# Patient Record
Sex: FEMALE | Race: BLACK OR AFRICAN AMERICAN | Employment: FULL TIME | URBAN - METROPOLITAN AREA
[De-identification: names, ages, dates, MRNs, and addresses within clinical notes are randomized per-mention and may not be internally consistent; named-entity substitution may affect disease eponyms.]

---

## 2018-03-28 ENCOUNTER — APPOINTMENT (OUTPATIENT)
Dept: GENERAL RADIOLOGY | Age: 33
End: 2018-03-28
Attending: INTERNAL MEDICINE
Payer: COMMERCIAL

## 2018-03-28 ENCOUNTER — HOSPITAL ENCOUNTER (EMERGENCY)
Age: 33
Discharge: HOME | End: 2018-03-28
Attending: INTERNAL MEDICINE | Admitting: INTERNAL MEDICINE
Payer: COMMERCIAL

## 2018-03-28 VITALS
HEIGHT: 59 IN | TEMPERATURE: 98.5 F | HEART RATE: 118 BPM | RESPIRATION RATE: 18 BRPM | SYSTOLIC BLOOD PRESSURE: 154 MMHG | WEIGHT: 215 LBS | OXYGEN SATURATION: 100 % | BODY MASS INDEX: 43.34 KG/M2 | DIASTOLIC BLOOD PRESSURE: 97 MMHG

## 2018-03-28 DIAGNOSIS — R52 BODY ACHES: ICD-10-CM

## 2018-03-28 DIAGNOSIS — J20.9 ACUTE BRONCHITIS, UNSPECIFIED ORGANISM: Primary | ICD-10-CM

## 2018-03-28 LAB
FLUAV AG NPH QL IA: NEGATIVE
FLUBV AG NOSE QL IA: NEGATIVE

## 2018-03-28 PROCEDURE — 99283 EMERGENCY DEPT VISIT LOW MDM: CPT

## 2018-03-28 PROCEDURE — 74011250637 HC RX REV CODE- 250/637: Performed by: PHYSICIAN ASSISTANT

## 2018-03-28 PROCEDURE — 87804 INFLUENZA ASSAY W/OPTIC: CPT | Performed by: INTERNAL MEDICINE

## 2018-03-28 PROCEDURE — 71046 X-RAY EXAM CHEST 2 VIEWS: CPT

## 2018-03-28 RX ORDER — IBUPROFEN 600 MG/1
600 TABLET ORAL
Qty: 20 TAB | Refills: 0 | Status: SHIPPED | OUTPATIENT
Start: 2018-03-28 | End: 2018-10-09

## 2018-03-28 RX ORDER — AZITHROMYCIN 250 MG/1
TABLET, FILM COATED ORAL
Qty: 6 TAB | Refills: 0 | Status: SHIPPED | OUTPATIENT
Start: 2018-03-28 | End: 2018-04-02

## 2018-03-28 RX ORDER — METHYLPREDNISOLONE 4 MG/1
TABLET ORAL
Qty: 1 DOSE PACK | Refills: 0 | Status: SHIPPED | OUTPATIENT
Start: 2018-03-28 | End: 2018-10-09

## 2018-03-28 RX ORDER — HYDROCODONE POLISTIREX AND CHLORPHENIRAMINE POLISTIREX 10; 8 MG/5ML; MG/5ML
5 SUSPENSION, EXTENDED RELEASE ORAL
Status: COMPLETED | OUTPATIENT
Start: 2018-03-28 | End: 2018-03-28

## 2018-03-28 RX ORDER — IBUPROFEN 400 MG/1
800 TABLET ORAL
Status: COMPLETED | OUTPATIENT
Start: 2018-03-28 | End: 2018-03-28

## 2018-03-28 RX ADMIN — HYDROCODONE POLISTIREX AND CHLORPHENIRAMINE POLISTIREX 5 ML: 10; 8 SUSPENSION, EXTENDED RELEASE ORAL at 18:53

## 2018-03-28 RX ADMIN — IBUPROFEN 800 MG: 400 TABLET, FILM COATED ORAL at 18:52

## 2018-03-28 NOTE — ED NOTES
Discharged per ambulatory, no acute distress on discharge, written inst with rx x 4 e faxed to pharmacy, verbalizes understanding  Patient armband removed and shredded

## 2018-03-28 NOTE — DISCHARGE INSTRUCTIONS
Bronchitis: Care Instructions  Your Care Instructions    Bronchitis is inflammation of the bronchial tubes, which carry air to the lungs. The tubes swell and produce mucus, or phlegm. The mucus and inflamed bronchial tubes make you cough. You may have trouble breathing. Most cases of bronchitis are caused by viruses like those that cause colds. Antibiotics usually do not help and they may be harmful. Bronchitis usually develops rapidly and lasts about 2 to 3 weeks in otherwise healthy people. Follow-up care is a key part of your treatment and safety. Be sure to make and go to all appointments, and call your doctor if you are having problems. It's also a good idea to know your test results and keep a list of the medicines you take. How can you care for yourself at home? · Take all medicines exactly as prescribed. Call your doctor if you think you are having a problem with your medicine. · Get some extra rest.  · Take an over-the-counter pain medicine, such as acetaminophen (Tylenol), ibuprofen (Advil, Motrin), or naproxen (Aleve) to reduce fever and relieve body aches. Read and follow all instructions on the label. · Do not take two or more pain medicines at the same time unless the doctor told you to. Many pain medicines have acetaminophen, which is Tylenol. Too much acetaminophen (Tylenol) can be harmful. · Take an over-the-counter cough medicine that contains dextromethorphan to help quiet a dry, hacking cough so that you can sleep. Avoid cough medicines that have more than one active ingredient. Read and follow all instructions on the label. · Breathe moist air from a humidifier, hot shower, or sink filled with hot water. The heat and moisture will thin mucus so you can cough it out. · Do not smoke. Smoking can make bronchitis worse. If you need help quitting, talk to your doctor about stop-smoking programs and medicines. These can increase your chances of quitting for good.   When should you call for help? Call 911 anytime you think you may need emergency care. For example, call if:  ? · You have severe trouble breathing. ?Call your doctor now or seek immediate medical care if:  ? · You have new or worse trouble breathing. ? · You cough up dark brown or bloody mucus (sputum). ? · You have a new or higher fever. ? · You have a new rash. ? Watch closely for changes in your health, and be sure to contact your doctor if:  ? · You cough more deeply or more often, especially if you notice more mucus or a change in the color of your mucus. ? · You are not getting better as expected. Where can you learn more? Go to http://dilma-shana.info/. Enter H333 in the search box to learn more about \"Bronchitis: Care Instructions. \"  Current as of: May 12, 2017  Content Version: 11.4  © 3666-8500 [employer]. Care instructions adapted under license by [employer] (which disclaims liability or warranty for this information). If you have questions about a medical condition or this instruction, always ask your healthcare professional. [place] [address] any warranty or liability for your use of this information. Cough: Care Instructions  Your Care Instructions    A cough is your body's response to something that bothers your throat or airways. Many things can cause a cough. You might cough because of a cold or the flu, bronchitis, or asthma. Smoking, postnasal drip, allergies, and stomach acid that backs up into your throat also can cause coughs. A cough is a symptom, not a disease. Most coughs stop when the cause, such as a cold, goes away. You can take a few steps at home to cough less and feel better. Follow-up care is a key part of your treatment and safety. Be sure to make and go to all appointments, and call your doctor if you are having problems. It's also a good idea to know your test results and keep a list of the medicines you take.   How can you care for yourself at home? · Drink lots of water and other fluids. This helps thin the mucus and soothes a dry or sore throat. Honey or lemon juice in hot water or tea may ease a dry cough. · Take cough medicine as directed by your doctor. · Prop up your head on pillows to help you breathe and ease a dry cough. · Try cough drops to soothe a dry or sore throat. Cough drops don't stop a cough. Medicine-flavored cough drops are no better than candy-flavored drops or hard candy. · Do not smoke. Avoid secondhand smoke. If you need help quitting, talk to your doctor about stop-smoking programs and medicines. These can increase your chances of quitting for good. When should you call for help? Call 911 anytime you think you may need emergency care. For example, call if:  ? · You have severe trouble breathing. ?Call your doctor now or seek immediate medical care if:  ? · You cough up blood. ? · You have new or worse trouble breathing. ? · You have a new or higher fever. ? · You have a new rash. ? Watch closely for changes in your health, and be sure to contact your doctor if:  ? · You cough more deeply or more often, especially if you notice more mucus or a change in the color of your mucus. ? · You have new symptoms, such as a sore throat, an earache, or sinus pain. ? · You do not get better as expected. Where can you learn more? Go to http://dilma-shana.info/. Enter D279 in the search box to learn more about \"Cough: Care Instructions. \"  Current as of: May 12, 2017  Content Version: 11.4  © 1839-9898 [employer]. Care instructions adapted under license by [employer] (which disclaims liability or warranty for this information). If you have questions about a medical condition or this instruction, always ask your healthcare professional. [address] any warranty or liability for your use of this information.

## 2018-03-28 NOTE — LETTER
[facility] [place] 
THE [facility] EMERGENCY DEPT 
[address] [phone] Work/School Note Date: 3/28/2018 To Whom It May concern: 
 
Malu Garza was seen and treated today in the emergency room by the following provider(s): 
Attending Provider: Renee Sanchez MD 
Physician Assistant: Albina Sanford. Malu Garza may return to work on 4/2/18. Sincerely, Roxi Iqbal PA-C

## 2018-03-28 NOTE — ED TRIAGE NOTES
Triage: pt complains of cough, nasal congestion, and body aches x 3 days. Sepsis Screening completed    (  )Patient meets SIRS criteria. (  x)Patient does not meet SIRS criteria.       SIRS Criteria is achieved when two or more of the following are present   Temperature < 96.8°F (36°C) or > 100.9°F (38.3°C)   Heart Rate > 90 beats per minute   Respiratory Rate > 20 breaths per minute   WBC count > 12,000 or <4,000 or > 10% bands

## 2018-03-28 NOTE — ED PROVIDER NOTES
EMERGENCY DEPARTMENT HISTORY AND PHYSICAL EXAM    Date: 3/28/2018  Patient Name: Gabrielle Willis    History of Presenting Illness     Chief Complaint   Patient presents with    Cough    Generalized Body Aches         History Provided By: Patient    Chief Complaint: Cough  Duration: 4 days  Timing:  Worsening  Severity: 7 out of 10  Modifying Factors: Minimal relief with Mucinex and Tylenol  Associated Symptoms: subjective fever, hot/cold flashes, congestion, sore throat, body aches, and chest and throat pain with coughing    Additional History (Context):   6:03 PM  Gabrielle Willis is a 34 y.o. female presents to the emergency department C/O gradually worsening productive cough, onset 4 days ago. Associated sxs include subjective fever, hot/cold flashes, congestion, sore throat, sinus pressure, body aches, and chest and throat pain with coughing. Tried Mucinex and Tylenol with minimal relief. Pt denies use of tobacco/EtOH/illicit drugs, ear pain, and any other sxs or complaints. PCP: None        Past History     Past Medical History:  History reviewed. No pertinent past medical history. Past Surgical History:  History reviewed. No pertinent surgical history. Family History:  History reviewed. No pertinent family history. Social History:  Social History   Substance Use Topics    Smoking status: Never Smoker    Smokeless tobacco: Never Used    Alcohol use No       Allergies: Allergies   Allergen Reactions    Nickel Rash    Shellfish Derived Nausea and Vomiting         Review of Systems   Review of Systems   Constitutional: Positive for chills (hot/cold flashes) and fever (subjective). HENT: Positive for congestion, sinus pressure and sore throat. Negative for ear pain. Respiratory: Positive for cough. Cardiovascular: Positive for chest pain (with coughing). Musculoskeletal: Positive for myalgias (body aches). All other systems reviewed and are negative.       Physical Exam     Vitals: 03/28/18 1753   BP: (!) 154/97   Pulse: (!) 118   Resp: 18   Temp: 98.5 °F (36.9 °C)   SpO2: 100%   Weight: 97.5 kg (215 lb)   Height: 4' 11\" (1.499 m)     Physical Exam   Constitutional: She is oriented to person, place, and time. She appears well-developed and well-nourished. HENT:   Head: Normocephalic and atraumatic. Right Ear: Hearing, tympanic membrane, external ear and ear canal normal.   Left Ear: Hearing, tympanic membrane, external ear and ear canal normal.   Nose: Mucosal edema present. No nasal septal hematoma. No epistaxis. Right sinus exhibits no frontal sinus tenderness. Left sinus exhibits no maxillary sinus tenderness and no frontal sinus tenderness. Mouth/Throat: Uvula is midline and mucous membranes are normal. No trismus in the jaw. No uvula swelling. Posterior oropharyngeal erythema present. No oropharyngeal exudate, posterior oropharyngeal edema or tonsillar abscesses. Airway is grossly patent, no omaira's angina   Eyes: EOM are normal. Pupils are equal, round, and reactive to light. No scleral icterus. Neck: Neck supple. No tracheal deviation present. Cardiovascular: Normal rate, regular rhythm and normal heart sounds. Exam reveals no gallop and no friction rub. No murmur heard. Pulmonary/Chest: Effort normal and breath sounds normal. No respiratory distress. She has no wheezes. She has no rales. Hacking cough   Neurological: She is alert and oriented to person, place, and time. No cranial nerve deficit. Skin: Skin is warm and dry. No rash noted. She is not diaphoretic. No erythema. No pallor. Psychiatric: She has a normal mood and affect. Her behavior is normal.   Nursing note and vitals reviewed.          Diagnostic Study Results     Labs -     Recent Results (from the past 12 hour(s))   INFLUENZA A & B AG (RAPID TEST)    Collection Time: 03/28/18  5:59 PM   Result Value Ref Range    Influenza A Antigen NEGATIVE  NEG      Influenza B Antigen NEGATIVE  NEG Radiologic Studies -     6:52 PM  RADIOLOGY FINDINGS  Chest X-ray shows NAP  Pending review by Radiologist  Recorded by [address] [place] [address], ED Scribe, as dictated by Tito Boyer PA-C     XR CHEST PA LAT    (Results Pending)     CT Results  (Last 48 hours)    None        CXR Results  (Last 48 hours)    None            Medical Decision Making   I am the first provider for this patient. I reviewed the vital signs, available nursing notes, past medical history, past surgical history, family history and social history. Vital Signs-Reviewed the patient's vital signs. Pulse Oximetry Analysis - 100% on RA     ED Course:   6:03 PM Initial assessment performed. The patients presenting problems have been discussed, and they are in agreement with the care plan formulated and outlined with them. I have encouraged them to ask questions as they arise throughout their visit. Provider Notes (Medical Decision Making): Impression:   Acute bronchitis. Will cross cover with Zpak, medrol, cough medicine, and steroids. Albina Holt    Procedures:  Procedures    Diagnosis and Disposition       DISCHARGE NOTE:  6:52 PM  [address] [place]  results have been reviewed with her. She has been counseled regarding her diagnosis, treatment, and plan. She verbally conveys understanding and agreement of the signs, symptoms, diagnosis, treatment and prognosis and additionally agrees to follow up as discussed. She also agrees with the care-plan and conveys that all of her questions have been answered. I have also provided discharge instructions for her that include: educational information regarding their diagnosis and treatment, and list of reasons why they would want to return to the ED prior to their follow-up appointment, should her condition change. She has been provided with education for proper emergency department utilization. CLINICAL IMPRESSION:    1. Acute bronchitis, unspecified organism    2.  Body aches PLAN:  1. D/C Home  2. Current Discharge Medication List      START taking these medications    Details   dextromethorphan-guaiFENesin (ROBITUSSIN-DM)  mg/5 mL syrup Take 10 mL by mouth every six (6) hours as needed for Cough. Qty: 120 mL, Refills: 0      ibuprofen (MOTRIN) 600 mg tablet Take 1 Tab by mouth every six (6) hours as needed for Pain. Qty: 20 Tab, Refills: 0      methylPREDNISolone (MEDROL, JOSEFINA,) 4 mg tablet Take as directed  Qty: 1 Dose Pack, Refills: 0      azithromycin (ZITHROMAX Z-JOSEFINA) 250 mg tablet Take two tablets on the first day and then one every day until completion  Qty: 6 Tab, Refills: 0           3. Follow-up Information     Follow up With Details Comments Kanika Lara MD Schedule an appointment as soon as possible for a visit in 2 days PCP follow up. [address]  [phone]      [facility]  As needed, if symptoms worsen [address] [address]  [phone]        ___________________________   Attestations:     SCRIBE ATTESTATION:  This note is prepared by Adalgisa Ballard, acting as Scribe for Mary Schwartz PA-C.    PROVIDER ATTESTATION:  Mary Schwartz PA-C : The scribe's documentation has been prepared under my direction and personally reviewed by me in its entirety.  I confirm that the note above accurately reflects all work, treatment, procedures, and medical decision making performed by me.   _______________________________

## 2020-02-25 ENCOUNTER — HOSPITAL ENCOUNTER (OUTPATIENT)
Dept: ONCOLOGY | Age: 35
Discharge: HOME | End: 2020-02-25

## 2020-02-25 ENCOUNTER — OFFICE VISIT (OUTPATIENT)
Dept: ONCOLOGY | Age: 35
End: 2020-02-25

## 2020-02-25 ENCOUNTER — HOSPITAL ENCOUNTER (OUTPATIENT)
Dept: LAB | Age: 35
Discharge: HOME | End: 2020-02-25
Payer: COMMERCIAL

## 2020-02-25 VITALS
TEMPERATURE: 98.7 F | WEIGHT: 202 LBS | HEART RATE: 90 BPM | DIASTOLIC BLOOD PRESSURE: 83 MMHG | HEIGHT: 59 IN | RESPIRATION RATE: 16 BRPM | BODY MASS INDEX: 40.72 KG/M2 | OXYGEN SATURATION: 98 % | SYSTOLIC BLOOD PRESSURE: 123 MMHG

## 2020-02-25 DIAGNOSIS — D50.0 IRON DEFICIENCY ANEMIA DUE TO CHRONIC BLOOD LOSS: ICD-10-CM

## 2020-02-25 DIAGNOSIS — D50.0 IRON DEFICIENCY ANEMIA DUE TO CHRONIC BLOOD LOSS: Primary | ICD-10-CM

## 2020-02-25 LAB
25(OH)D3 SERPL-MCNC: 17.6 NG/ML (ref 30–100)
ALBUMIN SERPL-MCNC: 3.8 G/DL (ref 3.4–5)
ALBUMIN/GLOB SERPL: 1 {RATIO} (ref 0.8–1.7)
ALP SERPL-CCNC: 72 U/L (ref 45–117)
ALT SERPL-CCNC: 19 U/L (ref 13–56)
ANION GAP SERPL CALC-SCNC: 7 MMOL/L (ref 3–18)
AST SERPL-CCNC: 15 U/L (ref 10–38)
BASO+EOS+MONOS # BLD AUTO: 0.4 K/UL (ref 0–2.3)
BASO+EOS+MONOS NFR BLD AUTO: 7 % (ref 0.1–17)
BILIRUB SERPL-MCNC: 1.1 MG/DL (ref 0.2–1)
BUN SERPL-MCNC: 20 MG/DL (ref 7–18)
BUN/CREAT SERPL: 22 (ref 12–20)
CALCIUM SERPL-MCNC: 8.7 MG/DL (ref 8.5–10.1)
CHLORIDE SERPL-SCNC: 106 MMOL/L (ref 100–111)
CO2 SERPL-SCNC: 26 MMOL/L (ref 21–32)
CREAT SERPL-MCNC: 0.92 MG/DL (ref 0.6–1.3)
DIFFERENTIAL METHOD BLD: ABNORMAL
ERYTHROCYTE [DISTWIDTH] IN BLOOD BY AUTOMATED COUNT: 23.5 % (ref 11.5–14.5)
FERRITIN SERPL-MCNC: 4 NG/ML (ref 8–388)
FOLATE SERPL-MCNC: 6.6 NG/ML (ref 3.1–17.5)
GLOBULIN SER CALC-MCNC: 4 G/DL (ref 2–4)
GLUCOSE SERPL-MCNC: 84 MG/DL (ref 74–99)
HCT VFR BLD AUTO: 33.6 % (ref 36–48)
HGB BLD-MCNC: 9.4 G/DL (ref 12–16)
IRON SATN MFR SERPL: 4 % (ref 20–50)
IRON SERPL-MCNC: 18 UG/DL (ref 50–175)
LYMPHOCYTES # BLD: 2.4 K/UL (ref 1.1–5.9)
LYMPHOCYTES NFR BLD: 41 % (ref 14–44)
MCH RBC QN AUTO: 22.1 PG (ref 25–35)
MCHC RBC AUTO-ENTMCNC: 28 G/DL (ref 31–37)
MCV RBC AUTO: 79.1 FL (ref 78–102)
NEUTS SEG # BLD: 3.1 K/UL (ref 1.8–9.5)
NEUTS SEG NFR BLD: 52 % (ref 40–70)
PLATELET # BLD AUTO: 230 K/UL (ref 140–440)
POTASSIUM SERPL-SCNC: 3.8 MMOL/L (ref 3.5–5.5)
PROT SERPL-MCNC: 7.8 G/DL (ref 6.4–8.2)
RBC # BLD AUTO: 4.25 M/UL (ref 4.1–5.1)
RETICS/RBC NFR AUTO: 1.4 % (ref 0.5–2.3)
SODIUM SERPL-SCNC: 139 MMOL/L (ref 136–145)
TIBC SERPL-MCNC: 469 UG/DL (ref 250–450)
VIT B12 SERPL-MCNC: 421 PG/ML (ref 211–911)
WBC # BLD AUTO: 5.9 K/UL (ref 4.5–13)

## 2020-02-25 PROCEDURE — 85045 AUTOMATED RETICULOCYTE COUNT: CPT

## 2020-02-25 PROCEDURE — 80053 COMPREHEN METABOLIC PANEL: CPT

## 2020-02-25 PROCEDURE — 36415 COLL VENOUS BLD VENIPUNCTURE: CPT

## 2020-02-25 PROCEDURE — 82668 ASSAY OF ERYTHROPOIETIN: CPT

## 2020-02-25 PROCEDURE — 83540 ASSAY OF IRON: CPT

## 2020-02-25 PROCEDURE — 82728 ASSAY OF FERRITIN: CPT

## 2020-02-25 PROCEDURE — 82607 VITAMIN B-12: CPT

## 2020-02-25 PROCEDURE — 82306 VITAMIN D 25 HYDROXY: CPT

## 2020-02-25 NOTE — PROGRESS NOTES
Identified pt with two pt identifiers(name and ). Reviewed record in preparation for visit and have obtained necessary documentation. Chief Complaint   Patient presents with   [facility] Patient        Health Maintenance Due   Topic    DTaP/Tdap/Td series (1 - Tdap)    PAP AKA CERVICAL CYTOLOGY     Influenza Age 5 to Adult        Coordination of Care Questionnaire:  :   1) Have you been to an emergency room, urgent care, or hospitalized since your last visit? If yes, where when, and reason for visit? yes  @ [address] [place] [address]      2. Have seen or consulted any other health care provider since your last visit? If yes, where when, and reason for visit? NO      3) Do you have an Advanced Directive/ Living Will in place?  NO  If yes, do we have a copy on file NO  If no, would you like information NO      Learning Assessment 2020   PRIMARY LEARNER Patient   HIGHEST LEVEL OF EDUCATION - PRIMARY LEARNER  SOME COLLEGE   BARRIERS PRIMARY LEARNER NONE   CO-LEARNER CAREGIVER No   PRIMARY LANGUAGE ENGLISH   LEARNER PREFERENCE PRIMARY READING     LISTENING   ANSWERED BY Patient   RELATIONSHIP SELF        3 most recent PHQ Screens 2020   Little interest or pleasure in doing things Not at all   Feeling down, depressed, irritable, or hopeless Not at all   Total Score PHQ 2 0

## 2020-02-25 NOTE — PROGRESS NOTES
Hematology/Oncology Consultation Note    Name: Norleen Hammans  Date: 2020  : 1985    Other, MD Joaquín       Ms. Josy Mcwilliams  is a 27 y.o. -American woman who was referred for an evaluation of severe anemia. Subjective:   Chief complaint: Severe anemia    History of present illness:  Ms. Josy Mcwilliams is a 79-year-old -American woman who has a long history of anemia most likely iron deficiency related to chronic blood loss from heavy menstrual bleeding. The patient reports that H 15 she underwent a transfusion of packed red blood cells. She has subsequently undergone a history of D&C. She also had a prior spinal tap related to her anemia which had been causing what she feels was pressure on her brain. This was done to relieve pain. She complains of feeling tired and weak most of the time. On 2020 she had a transfusion of 2 units of packed red blood cells at a time when her hemoglobin was found to be quite low at about 6.2 g/dL. She reports having heavy menstrual bleeding each month lasting for about 5 days. Usually the first 3 to 4 days are quite heavy. She was started on birth control pills recently as a means of controlling her excessive menstrual bleeding but the total number of days increased to about 14 days of actual menstrual bleeding. She is now taking iron supplement over-the-counter twice daily. Primarily she complains of fatigue and weakness. In addition to the over-the-counter iron she takes Aleve occasionally for headaches and she does have a history of heartburn for which she takes Zantac. There are no other complaints or concerns.   Past Medical History:   Diagnosis Date    Adverse effect of anesthesia     difficulty getting pt. to sleep    Anemia     Fibroids     Functional heart murmur     GERD (gastroesophageal reflux disease)     Heavy menstrual bleeding     Iron deficiency     Ovarian cyst     Pelvic pain     Polyp of cervix        Allergies Allergen Reactions    Nickel Rash    Shellfish Derived Nausea and Vomiting       Past Surgical History:   Procedure Laterality Date    HX WISDOM TEETH EXTRACTION         Social History     Socioeconomic History    Marital status: UNKNOWN     Spouse name: Not on file    Number of children: Not on file    Years of education: Not on file    Highest education level: Not on file   Occupational History    Not on file   Social Needs    Financial resource strain: Not on file    Food insecurity:     Worry: Not on file     Inability: Not on file    Transportation needs:     Medical: Not on file     Non-medical: Not on file   Tobacco Use    Smoking status: Former Smoker    Smokeless tobacco: Never Used   Substance and Sexual Activity    Alcohol use: Yes     Comment: rare    Drug use: No    Sexual activity: Not Currently   Lifestyle    Physical activity:     Days per week: Not on file     Minutes per session: Not on file    Stress: Not on file   Relationships    Social connections:     Talks on phone: Not on file     Gets together: Not on file     Attends Yazidism service: Not on file     Active member of club or organization: Not on file     Attends meetings of clubs or organizations: Not on file     Relationship status: Not on file    Intimate partner violence:     Fear of current or ex partner: Not on file     Emotionally abused: Not on file     Physically abused: Not on file     Forced sexual activity: Not on file   Other Topics Concern    Not on file   Social History Narrative    Not on file       Family History   Problem Relation Age of Onset    Depression Mother     Heart Disease Father     Hypertension Father     Hypertension Sister     Hypertension Sister        Current Outpatient Medications   Medication Sig Dispense Refill    naproxen sodium (ALEVE) 220 mg cap Take  by mouth.  ferrous sulfate, dried (IRON, DRIED, PO) Take 60 mg by mouth daily.       diphenhydramine HCl (SLEEP AID, DIPHENHYDRAMINE, PO) Take  by mouth nightly as needed.  ergocalciferol (VITAMIN D2) 50,000 unit capsule Take 50,000 Units by mouth every seven (7) days.  Biotin 2,500 mcg cap Take 1 Tab by mouth daily.  raNITIdine (ZANTAC) 75 mg tab Take  by mouth daily as needed. Review of Systems    General ROS:The patient complains of some fatigue and weakness. Psychological ROS: patient denies having any psychological symptoms such as hallucinations, depression or anxiety. Ophthalmic ROS:the patient denies having any visual impairment or eye discomfort. ENT ROS: there are no abnormalities reported. Allergy and Immunology ROS:the patient denies having any seasonal allergies or allergies to medications other than those already outlined above. Hematological and Lymphatic ROS: the patient denies having any bruising, bleeding or lymphadenopathy. Endocrine ROS: the patient denies having any heat or cold intolerance. There is no history of diabetes or thyroid disorders. Breast ROS: the patient denies having any history of breast mass, nipple discharge, or lumps. Respiratory ROS:the patient denies having any cough, shortness of breath, or dyspnea on exertion. Cardiovascular ROS: there are no complaints of chest pain, palpitations, chest pounding, or dyspnea on exertion. Gastrointestinal ROS: the patient denies having nausea, emesis, diarrhea, constipation, or blood in the stool. Genito-Urinary ROS: the patient denies having urinary urgency, frequency, or dysuria. Musculoskeletal ROS: with the exception of mild arthralgias the patient has no other musculoskeletal complaints. Neurological ROS: the patient denies having any numbness, tingling, or neurologic deficits. Dermatological ROS:patient denies having any unexplained rash, skin ulcerations, or hives.       Objective:     Visit Vitals  /83 (BP 1 Location: Left arm, BP Patient Position: Sitting)   Pulse 90   Temp 98.7 °F (37.1 °C) (Oral)   Resp 16   Ht 4' 11\" (1.499 m)   Wt 91.6 kg (202 lb)   LMP 02/05/2020   SpO2 98%   BMI 40.80 kg/m²        ECOGPS=0  Physical Exam:   Gen. Appearance: the patient is in no acute distress. Skin: There is no evidence of bruise or rash. HEENT: The head is normocephalic and atraumatic. The conjunctiva and sclera are clear. Pupils are equal, round, reactive to light, and accommodation. The extraocular movements are intact. ENT reveals no oral mucosal lesions or ulcerations. Neck: Supple without lymphadenopathy or thyromegaly. Lungs: Clear to auscultation and percussion; there are no wheezes or rhonchi. Heart: Regular rate and rhythm; there are no murmurs, gallops, or rubs. Abdomen: Bowel sounds are present and normal.  There is no guarding, tenderness, or hepatosplenomegaly. Extremities: There is no clubbing, cyanosis, or edema. Neurologic: There are no focal neurologic deficits. Lymphatics: There is no palpable peripheral lymphadenopathy. Lab data: The CBC dated 1/16/2020 showed a WBC count of 6.2, hemoglobin of 6.2 g/dL, hematocrit of 23.8%, and the platelet count was 753,008. Assessment:   Severe anemia: The patient presented on 6 1/16/2020 with severe anemia as evidenced by a hemoglobin of 6.2 g/dL with hematocrit of 23.8%. She did receive a transfusion with 2 units of packed red blood cells. She gives a history of having excessive menstrual bleeding since age 4 years. I suspect that her severe anemia is related to chronic blood loss over the years      Plan:   Severe anemia: At this time a comprehensive metabolic panel, CBC, iron profile, ferritin level, vitamin B12 level, and vitamin D level will be ordered. Additionally erythropoietin and reticulocyte count will also be requested. I will see her back in clinic in 2 weeks to review lab data.   If the ferritin level is exceedingly low she will be offered intravenous iron as a primary source of iron replacement with Injectafer at a dose of 750 mg given weekly for 2 doses. Follow-up in 2 weeks. Orders Placed This Encounter    COMPLETE CBC & AUTO DIFF WBC    InHouse CBC ([employer])     Standing Status:   Future     Standing Expiration Date:   3/7/6205    METABOLIC PANEL, COMPREHENSIVE     Standing Status:   Future     Standing Expiration Date:   2/25/2021    IRON PROFILE     Standing Status:   Future     Standing Expiration Date:   2/25/2021    FERRITIN     Standing Status:   Future     Standing Expiration Date:   2/25/2021    VITAMIN B12 & FOLATE     Standing Status:   Future     Standing Expiration Date:   2/25/2021    VITAMIN D, 25 HYDROXY     Standing Status:   Future     Standing Expiration Date:   2/25/2021    ERYTHROPOIETIN     Standing Status:   Future     Standing Expiration Date:   2/25/2021    RETICULOCYTE COUNT     Standing Status:   Future     Standing Expiration Date:   2/25/2021           Michelle Buchanan MD  2/25/2020      Please note: This document has been produced using voice recognition software. Unrecognized errors in transcription may be present.

## 2020-02-27 LAB — EPO SERPL-ACNC: 127.4 MIU/ML (ref 2.6–18.5)

## 2020-03-05 DIAGNOSIS — E55.9 VITAMIN D DEFICIENCY: Primary | ICD-10-CM

## 2020-03-05 PROBLEM — D50.0 IRON DEFICIENCY ANEMIA DUE TO CHRONIC BLOOD LOSS: Status: ACTIVE | Noted: 2020-03-05

## 2020-03-05 RX ORDER — ONDANSETRON 2 MG/ML
8 INJECTION INTRAMUSCULAR; INTRAVENOUS AS NEEDED
Status: CANCELLED | OUTPATIENT
Start: 2020-03-05

## 2020-03-05 RX ORDER — HYDROCORTISONE SODIUM SUCCINATE 100 MG/2ML
100 INJECTION, POWDER, FOR SOLUTION INTRAMUSCULAR; INTRAVENOUS AS NEEDED
Status: CANCELLED | OUTPATIENT
Start: 2020-03-05

## 2020-03-05 RX ORDER — ACETAMINOPHEN 325 MG/1
650 TABLET ORAL AS NEEDED
Status: CANCELLED
Start: 2020-03-05

## 2020-03-05 RX ORDER — DIPHENHYDRAMINE HYDROCHLORIDE 50 MG/ML
25 INJECTION, SOLUTION INTRAMUSCULAR; INTRAVENOUS AS NEEDED
Status: CANCELLED
Start: 2020-03-05

## 2020-03-05 RX ORDER — EPINEPHRINE 1 MG/ML
0.3 INJECTION, SOLUTION, CONCENTRATE INTRAVENOUS AS NEEDED
Status: CANCELLED | OUTPATIENT
Start: 2020-03-05

## 2020-03-05 RX ORDER — HEPARIN 100 UNIT/ML
300-500 SYRINGE INTRAVENOUS AS NEEDED
Status: CANCELLED
Start: 2020-03-05

## 2020-03-05 RX ORDER — SODIUM CHLORIDE 9 MG/ML
25 INJECTION, SOLUTION INTRAVENOUS CONTINUOUS
Status: CANCELLED | OUTPATIENT
Start: 2020-03-05

## 2020-03-05 RX ORDER — DIPHENHYDRAMINE HYDROCHLORIDE 50 MG/ML
50 INJECTION, SOLUTION INTRAMUSCULAR; INTRAVENOUS AS NEEDED
Status: CANCELLED
Start: 2020-03-05

## 2020-03-05 RX ORDER — SODIUM CHLORIDE 0.9 % (FLUSH) 0.9 %
10 SYRINGE (ML) INJECTION AS NEEDED
Status: CANCELLED
Start: 2020-03-05

## 2020-03-05 RX ORDER — ALBUTEROL SULFATE 0.83 MG/ML
2.5 SOLUTION RESPIRATORY (INHALATION) AS NEEDED
Status: CANCELLED
Start: 2020-03-05

## 2020-03-05 RX ORDER — ERGOCALCIFEROL 1.25 MG/1
50000 CAPSULE ORAL
Qty: 12 CAP | Refills: 0 | Status: SHIPPED | OUTPATIENT
Start: 2020-03-05 | End: 2020-03-05 | Stop reason: SDUPTHER

## 2020-03-05 RX ORDER — ERGOCALCIFEROL 1.25 MG/1
50000 CAPSULE ORAL
Qty: 12 CAP | Refills: 0 | Status: SHIPPED | OUTPATIENT
Start: 2020-03-05 | End: 2020-04-08

## 2020-03-05 RX ORDER — SODIUM CHLORIDE 9 MG/ML
10 INJECTION INTRAMUSCULAR; INTRAVENOUS; SUBCUTANEOUS AS NEEDED
Status: CANCELLED | OUTPATIENT
Start: 2020-03-05

## 2020-03-10 ENCOUNTER — OFFICE VISIT (OUTPATIENT)
Dept: ONCOLOGY | Age: 35
End: 2020-03-10

## 2020-03-10 VITALS
WEIGHT: 200 LBS | HEART RATE: 86 BPM | OXYGEN SATURATION: 99 % | DIASTOLIC BLOOD PRESSURE: 78 MMHG | TEMPERATURE: 98.6 F | BODY MASS INDEX: 40.32 KG/M2 | HEIGHT: 59 IN | SYSTOLIC BLOOD PRESSURE: 120 MMHG

## 2020-03-10 DIAGNOSIS — E55.9 VITAMIN D DEFICIENCY: Primary | ICD-10-CM

## 2020-03-10 DIAGNOSIS — D50.0 IRON DEFICIENCY ANEMIA DUE TO CHRONIC BLOOD LOSS: ICD-10-CM

## 2020-03-10 NOTE — PROGRESS NOTES
Hematology/medical oncology progress note    3/10/2020  Chastity Caba  YOB: 1995    PCP: Dr. Rodney Pantoja    Diagnosis iron deficiency anemia, severe and vitamin D deficiency    Ms. Gonzalo Pretty is a 58-year-old woman who is referred for evaluation of anemia. On 2/25/2020 the CBC revealed a WBC count of 5.9, hemoglobin was 9.4 g/dL, hematocrit 33.6%, and the platelet count was 301,043. The comprehensive metabolic panel revealed normal values except for slightly elevated BUN of 20 and a BUN/creatinine ratio of 22. A total bilirubin was slightly elevated at 1.1 iron profile showed an iron level of 18 mcg/dL with an iron saturation of only 4%. However her ferritin was markedly decreased at only 4 ng/mL consistent with severe iron deficiency anemia. I have recommended as treatment for the iron deficiency anemia Injectafer at a dose of 750 mg given over 1 hour for 2 doses. Additionally as a confirmation of her severe iron deficiency anemia erythropoietin level was elevated at 127.4. Vitamin D level was markedly diminished at 17.6 consistent with vitamin D deficiency which is classified as a vitamin D level less than 20 ng/mL. I have recommended vitamin D 50,000 units weekly for 12 weeks. We will see her back in clinic in about 6 weeks to see how well she is tolerating the treatment. The patient had her questions answered to her satisfaction. Total time 25 minutes, greater than 50% of the time was in counseling and coordination of care. Follow-up in 6 weeks. Dennis Alejandro MD, Vika High

## 2020-03-10 NOTE — PATIENT INSTRUCTIONS
Iron Deficiency Anemia: Care Instructions Your Care Instructions Anemia means that you do not have enough red blood cells. Red blood cells carry oxygen around your body. When you have anemia, it can make you pale, weak, and tired. Many things can cause anemia. The most common cause is loss of blood. This can happen if you have heavy menstrual periods. It can also happen if you have bleeding in your stomach or bowel. You can also get anemia if you don't have enough iron in your diet or if it's hard for your body to absorb iron. In some cases, pregnancy causes anemia. That's because a pregnant woman needs more iron. Your doctor may do more tests to find the cause of your anemia. If a disease or other health problem is causing it, your doctor will treat that problem. It's important to follow up with your doctor to make sure that your iron level returns to normal. 
Follow-up care is a key part of your treatment and safety. Be sure to make and go to all appointments, and call your doctor if you are having problems. It's also a good idea to know your test results and keep a list of the medicines you take. How can you care for yourself at home? · If your doctor recommended iron pills, take them as directed. ? Try to take the pills on an empty stomach. You can do this about 1 hour before or 2 hours after meals. But you may need to take iron with food to avoid an upset stomach. ? Do not take antacids or drink milk or anything with caffeine within 2 hours of when you take your iron. They can keep your body from absorbing the iron well. ? Vitamin C helps your body absorb iron. You may want to take iron pills with a glass of orange juice or some other food high in vitamin C. 
? Iron pills may cause stomach problems. These include heartburn, nausea, diarrhea, constipation, and cramps. It can help to drink plenty of fluids and include fruits, vegetables, and fiber in your diet. ? It's normal for iron pills to make your stool a greenish or grayish black. But internal bleeding can also cause dark stool. So it's important to tell your doctor about any color changes. ? Call your doctor if you think you are having a problem with your iron pills. Even after you start to feel better, it will take several months for your body to build up its supply of iron. ? If you miss a pill, don't take a double dose. ? Keep iron pills out of the reach of small children. Too much iron can be very dangerous. · Eat foods with a lot of iron. These include red meat, shellfish, poultry, and eggs. They also include beans, raisins, whole-grain bread, and leafy green vegetables. · Steam your vegetables. This is the best way to prepare them if you want to get as much iron as possible. · Be safe with medicines. Do not take nonsteroidal anti-inflammatory pain relievers unless your doctor tells you to. These include aspirin, naproxen (Aleve), and ibuprofen (Advil, Motrin). · Liquid iron can stain your teeth. But you can mix it with water or juice and drink it with a straw. Then it won't get on your teeth. When should you call for help? Call 911 anytime you think you may need emergency care. For example, call if: 
  · You passed out (lost consciousness).  
 Call your doctor now or seek immediate medical care if: 
  · You are short of breath.  
  · You are dizzy or light-headed, or you feel like you may faint.  
  · You have new or worse bleeding.  
 Watch closely for changes in your health, and be sure to contact your doctor if: 
  · You feel weaker or more tired than usual.  
  · You do not get better as expected. Where can you learn more? Go to http://dilma-shana.info/. Enter R733 in the search box to learn more about \"Iron Deficiency Anemia: Care Instructions. \" Current as of: March 28, 2019 Content Version: 12.2 © 0653-0136 [employer].  Care instructions adapted under license by [address] (which disclaims liability or warranty for this information). If you have questions about a medical condition or this instruction, always ask your healthcare professional. [address] any warranty or liability for your use of this information.

## 2020-03-24 ENCOUNTER — HOSPITAL ENCOUNTER (OUTPATIENT)
Dept: INFUSION THERAPY | Age: 35
Discharge: HOME | End: 2020-03-24
Payer: COMMERCIAL

## 2020-03-24 VITALS
DIASTOLIC BLOOD PRESSURE: 74 MMHG | RESPIRATION RATE: 20 BRPM | SYSTOLIC BLOOD PRESSURE: 115 MMHG | HEART RATE: 81 BPM | TEMPERATURE: 98.5 F | OXYGEN SATURATION: 100 %

## 2020-03-24 DIAGNOSIS — D50.0 IRON DEFICIENCY ANEMIA DUE TO CHRONIC BLOOD LOSS: Primary | ICD-10-CM

## 2020-03-24 PROCEDURE — 74011250636 HC RX REV CODE- 250/636: Performed by: NURSE PRACTITIONER

## 2020-03-24 PROCEDURE — 96365 THER/PROPH/DIAG IV INF INIT: CPT

## 2020-03-24 RX ORDER — DIPHENHYDRAMINE HYDROCHLORIDE 50 MG/ML
25 INJECTION, SOLUTION INTRAMUSCULAR; INTRAVENOUS AS NEEDED
Status: CANCELLED
Start: 2020-03-31

## 2020-03-24 RX ORDER — SODIUM CHLORIDE 0.9 % (FLUSH) 0.9 %
10 SYRINGE (ML) INJECTION AS NEEDED
Status: CANCELLED
Start: 2020-03-31

## 2020-03-24 RX ORDER — DIPHENHYDRAMINE HYDROCHLORIDE 50 MG/ML
50 INJECTION, SOLUTION INTRAMUSCULAR; INTRAVENOUS AS NEEDED
Status: CANCELLED
Start: 2020-03-31

## 2020-03-24 RX ORDER — ALBUTEROL SULFATE 0.83 MG/ML
2.5 SOLUTION RESPIRATORY (INHALATION) AS NEEDED
Status: CANCELLED
Start: 2020-03-31

## 2020-03-24 RX ORDER — EPINEPHRINE 1 MG/ML
0.3 INJECTION, SOLUTION, CONCENTRATE INTRAVENOUS AS NEEDED
Status: CANCELLED | OUTPATIENT
Start: 2020-03-31

## 2020-03-24 RX ORDER — SODIUM CHLORIDE 9 MG/ML
25 INJECTION, SOLUTION INTRAVENOUS CONTINUOUS
Status: CANCELLED | OUTPATIENT
Start: 2020-03-31

## 2020-03-24 RX ORDER — SODIUM CHLORIDE 0.9 % (FLUSH) 0.9 %
10 SYRINGE (ML) INJECTION AS NEEDED
Status: DISCONTINUED | OUTPATIENT
Start: 2020-03-24 | End: 2020-03-25 | Stop reason: HOSPADM

## 2020-03-24 RX ORDER — SODIUM CHLORIDE 9 MG/ML
10 INJECTION INTRAMUSCULAR; INTRAVENOUS; SUBCUTANEOUS AS NEEDED
Status: CANCELLED | OUTPATIENT
Start: 2020-03-31

## 2020-03-24 RX ORDER — ACETAMINOPHEN 325 MG/1
650 TABLET ORAL AS NEEDED
Status: CANCELLED
Start: 2020-03-31

## 2020-03-24 RX ORDER — SODIUM CHLORIDE 9 MG/ML
25 INJECTION, SOLUTION INTRAVENOUS CONTINUOUS
Status: DISCONTINUED | OUTPATIENT
Start: 2020-03-24 | End: 2020-03-25 | Stop reason: HOSPADM

## 2020-03-24 RX ORDER — ONDANSETRON 2 MG/ML
8 INJECTION INTRAMUSCULAR; INTRAVENOUS AS NEEDED
Status: CANCELLED | OUTPATIENT
Start: 2020-03-31

## 2020-03-24 RX ORDER — HEPARIN 100 UNIT/ML
300-500 SYRINGE INTRAVENOUS AS NEEDED
Status: CANCELLED
Start: 2020-03-31

## 2020-03-24 RX ORDER — HYDROCORTISONE SODIUM SUCCINATE 100 MG/2ML
100 INJECTION, POWDER, FOR SOLUTION INTRAMUSCULAR; INTRAVENOUS AS NEEDED
Status: CANCELLED | OUTPATIENT
Start: 2020-03-31

## 2020-03-24 RX ADMIN — SODIUM CHLORIDE 25 ML/HR: 9 INJECTION, SOLUTION INTRAVENOUS at 13:34

## 2020-03-24 RX ADMIN — FERRIC CARBOXYMALTOSE INJECTION 750 MG: 50 INJECTION, SOLUTION INTRAVENOUS at 13:35

## 2020-03-24 NOTE — PROGRESS NOTES
[facility] Progress Note    Date: 2020    Name: Paty Driver    MRN: [id number]         : 1985      Ms. Love Shook was assessed and education was provided. Ferric Carboxymaltose carenotes read to patient and copy sent home with patient, all questions answered. Ms. Anabella Pappas vitals were reviewed and patient was observed for 5 minutes prior to treatment. Patient Vitals for the past 4 hrs:   Temp Pulse Resp BP SpO2   20 1428 98.5 °F (36.9 °C) 81 20 115/74 100 %   20 1406 98.5 °F (36.9 °C) 74 20 111/71 100 %   20 1324 98.5 °F (36.9 °C) 79 20 112/79 100 %         Visit Vitals  /79 (BP 1 Location: Left arm, BP Patient Position: Sitting)   Pulse 79   Temp 98.5 °F (36.9 °C)   Resp 20   SpO2 100%   Breastfeeding No     PIV #22 started in left [address] x1 attempt. Ferric Carboxymaltose 750mg mixed in 250cc NS infused over 20 minutes as ordered. Upon completion of infusion Ms. Love Shook observed for 30 minutes. At the end of observation period Ms. Love Shook denies any signs or symptoms of an allergic reaction. PIV dc'd 2x2 and coban applied. Ms. Love Shook instructed to go to nearest ER if she develops and signs of allergic reaction. Ms. Love Shook tolerated the infusion, and had no complaints. Patient armband removed and shredded. Ms. Love Shook was discharged from [place] [address] in stable condition at 1435. She is to return on 20 at 1300 for her next  Ferric Carboxymaltose appointment.     Kaylee Mantilla RN  2020  2:00 PM

## 2020-03-31 ENCOUNTER — APPOINTMENT (OUTPATIENT)
Dept: INFUSION THERAPY | Age: 35
End: 2020-03-31
Payer: COMMERCIAL

## 2020-04-01 ENCOUNTER — HOSPITAL ENCOUNTER (OUTPATIENT)
Dept: INFUSION THERAPY | Age: 35
Discharge: HOME | End: 2020-04-01
Payer: COMMERCIAL

## 2020-04-01 VITALS
SYSTOLIC BLOOD PRESSURE: 109 MMHG | RESPIRATION RATE: 18 BRPM | TEMPERATURE: 98.4 F | DIASTOLIC BLOOD PRESSURE: 73 MMHG | OXYGEN SATURATION: 100 % | HEART RATE: 79 BPM

## 2020-04-01 DIAGNOSIS — D50.0 IRON DEFICIENCY ANEMIA DUE TO CHRONIC BLOOD LOSS: Primary | ICD-10-CM

## 2020-04-01 PROCEDURE — 74011250636 HC RX REV CODE- 250/636: Performed by: INTERNAL MEDICINE

## 2020-04-01 PROCEDURE — 96365 THER/PROPH/DIAG IV INF INIT: CPT | Performed by: NURSE PRACTITIONER

## 2020-04-01 RX ORDER — SODIUM CHLORIDE 9 MG/ML
25 INJECTION, SOLUTION INTRAVENOUS CONTINUOUS
Status: CANCELLED | OUTPATIENT
Start: 2020-04-07

## 2020-04-01 RX ORDER — ACETAMINOPHEN 325 MG/1
650 TABLET ORAL AS NEEDED
Status: CANCELLED
Start: 2020-04-07

## 2020-04-01 RX ORDER — HYDROCORTISONE SODIUM SUCCINATE 100 MG/2ML
100 INJECTION, POWDER, FOR SOLUTION INTRAMUSCULAR; INTRAVENOUS AS NEEDED
Status: CANCELLED | OUTPATIENT
Start: 2020-04-07

## 2020-04-01 RX ORDER — SODIUM CHLORIDE 9 MG/ML
10 INJECTION INTRAMUSCULAR; INTRAVENOUS; SUBCUTANEOUS AS NEEDED
Status: CANCELLED | OUTPATIENT
Start: 2020-04-07

## 2020-04-01 RX ORDER — DIPHENHYDRAMINE HYDROCHLORIDE 50 MG/ML
50 INJECTION, SOLUTION INTRAMUSCULAR; INTRAVENOUS AS NEEDED
Status: CANCELLED
Start: 2020-04-07

## 2020-04-01 RX ORDER — DIPHENHYDRAMINE HYDROCHLORIDE 50 MG/ML
25 INJECTION, SOLUTION INTRAMUSCULAR; INTRAVENOUS AS NEEDED
Status: CANCELLED
Start: 2020-04-07

## 2020-04-01 RX ORDER — EPINEPHRINE 1 MG/ML
0.3 INJECTION, SOLUTION, CONCENTRATE INTRAVENOUS AS NEEDED
Status: CANCELLED | OUTPATIENT
Start: 2020-04-07

## 2020-04-01 RX ORDER — ONDANSETRON 2 MG/ML
8 INJECTION INTRAMUSCULAR; INTRAVENOUS AS NEEDED
Status: CANCELLED | OUTPATIENT
Start: 2020-04-07

## 2020-04-01 RX ORDER — SODIUM CHLORIDE 0.9 % (FLUSH) 0.9 %
10 SYRINGE (ML) INJECTION AS NEEDED
Status: DISCONTINUED | OUTPATIENT
Start: 2020-04-01 | End: 2020-04-02 | Stop reason: HOSPADM

## 2020-04-01 RX ORDER — HEPARIN 100 UNIT/ML
300-500 SYRINGE INTRAVENOUS AS NEEDED
Status: CANCELLED
Start: 2020-04-07

## 2020-04-01 RX ORDER — ALBUTEROL SULFATE 0.83 MG/ML
2.5 SOLUTION RESPIRATORY (INHALATION) AS NEEDED
Status: CANCELLED
Start: 2020-04-07

## 2020-04-01 RX ORDER — SODIUM CHLORIDE 0.9 % (FLUSH) 0.9 %
10 SYRINGE (ML) INJECTION AS NEEDED
Status: CANCELLED
Start: 2020-04-07

## 2020-04-01 RX ADMIN — FERRIC CARBOXYMALTOSE INJECTION 750 MG: 50 INJECTION, SOLUTION INTRAVENOUS at 14:35

## 2020-04-01 RX ADMIN — Medication 10 ML: at 14:30

## 2020-04-01 NOTE — PROGRESS NOTES
[facility] Progress Note    Date: 2020    Name: Wade Riddle    MRN: [id number]         : 1985      Ms. Dariela Morrison was assessed and education was provided. Ferric Carboxymaltose carenotes read to patient and copy sent home with patient, all questions answered. Ms. Thea Newton vitals were reviewed and patient was observed for 5 minutes prior to treatment. Patient Vitals for the past 4 hrs:   Temp Pulse Resp BP SpO2   20 1459  79 18 109/73 100 %   20 1405 98.4 °F (36.9 °C) 84 18 113/77 100 %         Visit Vitals  /77 (BP 1 Location: Right arm, BP Patient Position: Sitting)   Pulse 84   Temp 98.4 °F (36.9 °C)   Resp 18   SpO2 100%   Breastfeeding No     PIV #22 started in right AC x3 attempts. Ferric Carboxymaltose 750mg mixed in 250cc NS infused over 20 minutes as ordered. Infusion complete. PIV dc'd 2x2 and coban applied. Ms. Dariela Morrison instructed to go to nearest ER if she develops and signs of allergic reaction. Ms. Dariela Morrison tolerated the infusion, and had no complaints. Patient armband removed and shredded. Ms. Dariela Morrison was discharged from [place] [address] in stable condition at 1500. She is to return on an as needed basis.     YOLIS James  2020

## 2020-05-01 ENCOUNTER — VIRTUAL VISIT (OUTPATIENT)
Dept: ONCOLOGY | Age: 35
End: 2020-05-01

## 2020-05-01 VITALS — HEIGHT: 59 IN | BODY MASS INDEX: 40.32 KG/M2 | WEIGHT: 200 LBS

## 2020-05-01 DIAGNOSIS — D50.0 IRON DEFICIENCY ANEMIA DUE TO CHRONIC BLOOD LOSS: Primary | ICD-10-CM

## 2020-05-01 DIAGNOSIS — E55.9 VITAMIN D DEFICIENCY: ICD-10-CM

## 2020-05-01 RX ORDER — ERGOCALCIFEROL 1.25 MG/1
50000 CAPSULE ORAL
Qty: 12 CAP | Refills: 2 | Status: SHIPPED | OUTPATIENT
Start: 2020-05-01 | End: 2021-01-02

## 2020-05-01 NOTE — PROGRESS NOTES
Hematology/medical oncology progress note    5/1/2020  Tobin Walton is a 27 y.o. female evaluated via telephone on 5/1/2020. YOB: 1985    PCP: Dr. Geralyn Gottron    Diagnosis: Iron deficiency anemia and vitamin D deficiency    Consent:  She and/or health care decision maker is aware that she may receive a bill for this telephone service, depending on her insurance coverage, and has provided verbal consent to proceed: YES      Documentation:  I communicated with the patient and/or health care decision maker about ongoing management of her iron deficiency and vitamin D. The patient recently completed 2 doses of Injectafer on 4/1/2020. I have explained to the patient that her pretreatment evaluation revealed that she had a hemoglobin of 3.6%. Ferritin level was only 4 ng/mL. Additionally vitamin D was 17.6 ng/mL. At this time I am ordering the patient vitamin D 50,000 units weekly for 12 weeks. She will be scheduled to follow-up in clinic in 8 weeks to reassess her CBC, comprehensive metabolic panel, iron profile, and ferritin levels. The patient had her questions answered to her satisfaction. Total time 25 minutes, greater than 50% of the time was in counseling and coordination of care. I AFFIRM this is a Patient Initiated Episode with an Established Patient who has not had a related appointment within my department in the past 7 days or scheduled within the next 24 hours. Total Time: Total time 25 minutes, greater than 50% of the time was in counseling and coordination of care. Note: not billable if this call serves to triage the patient into an appointment for the relevant concern      Lang Inman. Jovani Avila MD, [address]

## 2020-06-26 ENCOUNTER — HOSPITAL ENCOUNTER (OUTPATIENT)
Dept: LAB | Age: 35
Discharge: HOME | End: 2020-06-26
Payer: COMMERCIAL

## 2020-06-26 ENCOUNTER — OFFICE VISIT (OUTPATIENT)
Dept: ONCOLOGY | Age: 35
End: 2020-06-26

## 2020-06-26 ENCOUNTER — HOSPITAL ENCOUNTER (OUTPATIENT)
Dept: ONCOLOGY | Age: 35
Discharge: HOME | End: 2020-06-26

## 2020-06-26 ENCOUNTER — DOCUMENTATION ONLY (OUTPATIENT)
Dept: ONCOLOGY | Age: 35
End: 2020-06-26

## 2020-06-26 VITALS
OXYGEN SATURATION: 97 % | DIASTOLIC BLOOD PRESSURE: 91 MMHG | TEMPERATURE: 98.4 F | HEART RATE: 81 BPM | WEIGHT: 210 LBS | RESPIRATION RATE: 18 BRPM | SYSTOLIC BLOOD PRESSURE: 128 MMHG | BODY MASS INDEX: 42.41 KG/M2

## 2020-06-26 DIAGNOSIS — D50.0 IRON DEFICIENCY ANEMIA DUE TO CHRONIC BLOOD LOSS: Primary | ICD-10-CM

## 2020-06-26 DIAGNOSIS — E55.9 VITAMIN D DEFICIENCY: ICD-10-CM

## 2020-06-26 DIAGNOSIS — D50.0 IRON DEFICIENCY ANEMIA DUE TO CHRONIC BLOOD LOSS: ICD-10-CM

## 2020-06-26 LAB
25(OH)D3 SERPL-MCNC: 34.1 NG/ML (ref 30–100)
ALBUMIN SERPL-MCNC: 4 G/DL (ref 3.4–5)
ALBUMIN/GLOB SERPL: 1.1 {RATIO} (ref 0.8–1.7)
ALP SERPL-CCNC: 79 U/L (ref 45–117)
ALT SERPL-CCNC: 29 U/L (ref 13–56)
ANION GAP SERPL CALC-SCNC: 5 MMOL/L (ref 3–18)
AST SERPL-CCNC: 13 U/L (ref 10–38)
BASO+EOS+MONOS # BLD AUTO: 0.3 K/UL (ref 0–2.3)
BASO+EOS+MONOS NFR BLD AUTO: 6 % (ref 0.1–17)
BILIRUB SERPL-MCNC: 0.6 MG/DL (ref 0.2–1)
BUN SERPL-MCNC: 15 MG/DL (ref 7–18)
BUN/CREAT SERPL: 17 (ref 12–20)
CALCIUM SERPL-MCNC: 9.2 MG/DL (ref 8.5–10.1)
CHLORIDE SERPL-SCNC: 110 MMOL/L (ref 100–111)
CO2 SERPL-SCNC: 28 MMOL/L (ref 21–32)
CREAT SERPL-MCNC: 0.89 MG/DL (ref 0.6–1.3)
DIFFERENTIAL METHOD BLD: ABNORMAL
ERYTHROCYTE [DISTWIDTH] IN BLOOD BY AUTOMATED COUNT: 16.8 % (ref 11.5–14.5)
FERRITIN SERPL-MCNC: 26 NG/ML (ref 8–388)
GLOBULIN SER CALC-MCNC: 3.5 G/DL (ref 2–4)
GLUCOSE SERPL-MCNC: 85 MG/DL (ref 74–99)
HCT VFR BLD AUTO: 42.9 % (ref 36–48)
HGB BLD-MCNC: 14.2 G/DL (ref 12–16)
IRON SATN MFR SERPL: 28 % (ref 20–50)
IRON SERPL-MCNC: 90 UG/DL (ref 50–175)
LYMPHOCYTES # BLD: 1.7 K/UL (ref 1.1–5.9)
LYMPHOCYTES NFR BLD: 37 % (ref 14–44)
MCH RBC QN AUTO: 28.8 PG (ref 25–35)
MCHC RBC AUTO-ENTMCNC: 33.1 G/DL (ref 31–37)
MCV RBC AUTO: 87 FL (ref 78–102)
NEUTS SEG # BLD: 2.6 K/UL (ref 1.8–9.5)
NEUTS SEG NFR BLD: 57 % (ref 40–70)
PLATELET # BLD AUTO: 215 K/UL (ref 140–440)
POTASSIUM SERPL-SCNC: 4.4 MMOL/L (ref 3.5–5.5)
PROT SERPL-MCNC: 7.5 G/DL (ref 6.4–8.2)
RBC # BLD AUTO: 4.93 M/UL (ref 4.1–5.1)
SODIUM SERPL-SCNC: 143 MMOL/L (ref 136–145)
TIBC SERPL-MCNC: 326 UG/DL (ref 250–450)
WBC # BLD AUTO: 4.6 K/UL (ref 4.5–13)

## 2020-06-26 PROCEDURE — 36415 COLL VENOUS BLD VENIPUNCTURE: CPT

## 2020-06-26 PROCEDURE — 82306 VITAMIN D 25 HYDROXY: CPT

## 2020-06-26 PROCEDURE — 83540 ASSAY OF IRON: CPT

## 2020-06-26 PROCEDURE — 80053 COMPREHEN METABOLIC PANEL: CPT

## 2020-06-26 PROCEDURE — 82728 ASSAY OF FERRITIN: CPT

## 2020-06-26 NOTE — PROGRESS NOTES
Hematology/Oncology  Progress Note    Name: Khurram Davis  Date: 2020  : 1985    uLlú Xie MD     Ms. Frantz Orellana is a 27y.o. year old female who was seen for management of her enema and vitamin D deficiency     Current therapy: completed injectafer on 2020   Subjective:   Ms. Frantz Orellana is a 79-year-old woman who is referred for evaluation of anemia on 2020. On 2020 the CBC revealed a WBC count of 5.9, hemoglobin was 9.4 g/dL, hematocrit 33.6%, and the platelet count was 639,969. The comprehensive metabolic panel revealed normal values except for slightly elevated BUN of 20 and a BUN/creatinine ratio of 22. A total bilirubin was slightly elevated at 1.1 iron profile showed an iron level of 18 mcg/dL with an iron saturation of only 4%. However her ferritin was markedly decreased at only 4 ng/mL consistent with severe iron deficiency anemia. She was ordered treatment for the iron deficiency anemia with Injectafer at a dose of 750 mg given over 1 hour for 2 doses and this was completed on 2020. Additionally as a confirmation of her severe iron deficiency anemia erythropoietin level was elevated at 127.4. Vitamin D level was markedly diminished at 17.6 consistent with vitamin D deficiency which is classified as a vitamin D level less than 20 ng/mL. She was also provided vitamin D 50,000 units weekly for 12 weeks. Today she in the office for follow up after her iron infusion. She denied fever, chills, night sweat, unintentional weight loss, skin lumps or bumps, acute bleeding or bruising issues. Denied headache, acute vision change, dizziness, chest pain, worsen shortness of breath, palpitation, productive cough, nausea, vomiting, abdominal pain, altered bowel habits, dysuria, new bone pain or back pain, focal numbness or weakness.  Independent with ADLs and IADLs        History of present illness:  Ms. Frantz Orellana is a 79-year-old -American woman who has a long history of anemia most likely iron deficiency related to chronic blood loss from heavy menstrual bleeding. The patient reports that H 15 she underwent a transfusion of packed red blood cells. She has subsequently undergone a history of D&C. She also had a prior spinal tap related to her anemia which had been causing what she feels was pressure on her brain. This was done to relieve pain. She complains of feeling tired and weak most of the time. On January 16, 2020 she had a transfusion of 2 units of packed red blood cells at a time when her hemoglobin was found to be quite low at about 6.2 g/dL. She reports having heavy menstrual bleeding each month lasting for about 5 days. Usually the first 3 to 4 days are quite heavy. She was started on birth control pills recently as a means of controlling her excessive menstrual bleeding but the total number of days increased to about 14 days of actual menstrual bleeding. Past medical history, family history, and social history: these were reviewed and remains unchanged.     Past Medical History:   Diagnosis Date    Adverse effect of anesthesia     difficulty getting pt. to sleep    Anemia     Fibroids     Functional heart murmur     GERD (gastroesophageal reflux disease)     Heavy menstrual bleeding     Iron deficiency     Ovarian cyst     Pelvic pain     Polyp of cervix      Past Surgical History:   Procedure Laterality Date    HX WISDOM TEETH EXTRACTION       Social History     Socioeconomic History    Marital status: SINGLE     Spouse name: Not on file    Number of children: Not on file    Years of education: Not on file    Highest education level: Not on file   Occupational History    Not on file   Social Needs    Financial resource strain: Not on file    Food insecurity     Worry: Not on file     Inability: Not on file    Transportation needs     Medical: Not on file     Non-medical: Not on file   Tobacco Use    Smoking status: Former Smoker     Last attempt to quit: 4/8/2017     Years since quitting: 3.2    Smokeless tobacco: Never Used   Substance and Sexual Activity    Alcohol use: Yes     Comment: rare    Drug use: No    Sexual activity: Not Currently   Lifestyle    Physical activity     Days per week: Not on file     Minutes per session: Not on file    Stress: Not on file   Relationships    Social connections     Talks on phone: Not on file     Gets together: Not on file     Attends Restoration service: Not on file     Active member of club or organization: Not on file     Attends meetings of clubs or organizations: Not on file     Relationship status: Not on file    Intimate partner violence     Fear of current or ex partner: Not on file     Emotionally abused: Not on file     Physically abused: Not on file     Forced sexual activity: Not on file   Other Topics Concern     Service Not Asked    Blood Transfusions Not Asked    Caffeine Concern Not Asked    Occupational Exposure Not Asked   Lanett [place] Hazards Not Asked    Sleep Concern Not Asked    Stress Concern Not Asked    Weight Concern Not Asked    Special Diet Not Asked    Back Care Not Asked    Exercise Not Asked    Bike Helmet Not Asked    Seat Belt Not Asked    Self-Exams Not Asked   Social History Narrative    Not on file     Family History   Problem Relation Age of Onset    Depression Mother     Heart Disease Father     Hypertension Father     Hypertension Sister     Hypertension Sister      Current Outpatient Medications   Medication Sig Dispense Refill    ergocalciferol (ERGOCALCIFEROL) 1,250 mcg (50,000 unit) capsule Take 1 Cap by mouth every seven (7) days for 36 doses. 12 Cap 2    norethindrone-ethinyl estradiol (Junel FE 1/20, 28,) 1 mg-20 mcg (21)/75 mg (7) tab Take 1 Tab by mouth daily.  bisacodyL (DULCOLAX, BISACODYL,) 5 mg EC tablet Take 5 mg by mouth daily as needed for Constipation.       ibuprofen (MOTRIN) 800 mg tablet Take 800 mg by mouth every eight (8) hours as needed for Pain.  naproxen sodium (ALEVE) 220 mg cap Take  by mouth.  ferrous sulfate, dried (IRON, DRIED, PO) Take 60 mg by mouth two (2) times a day.  raNITIdine (ZANTAC) 75 mg tab Take  by mouth daily as needed. Review of Systems  Constitutional: The patient has no acute distress or discomfort. HEENT: The patient denies recent head trauma, eye pain, blurred vision,  hearing deficit, oropharyngeal mucosal pain or lesions, and the patient denies throat pain or discomfort. Lymphatics: The patient denies palpable peripheral lymphadenopathy. Hematologic: The patient denies having bruising, bleeding, or progressive fatigue. Respiratory: Patient denies having shortness of breath, cough, sputum production, fever, or dyspnea on exertion. Cardiovascular: The patient denies having leg pain, leg swelling, heart palpitations, chest permit, chest pain, or lightheadedness. The patient denies having dyspnea on exertion. Gastrointestinal: The patient denies having nausea, emesis, or diarrhea. The patient denies having any hematemesis or blood in the stool. Genitourinary: Patient denies having urinary urgency, frequency, or dysuria. The patient denies having blood in the urine. Psychological: The patient denies having symptoms of nervousness, anxiety, depression, or thoughts of harming himself some of this. Skin: Patient denies having skin rashes, skin, ulcerations, or unexplained itching or pruritus. Musculoskeletal: The patient denies having pain in the joints or bones. Objective:     Visit Vitals  BP (!) 128/91 (BP Patient Position: Sitting)   Pulse 81   Temp 98.4 °F (36.9 °C) (Oral)   Resp 18   Wt 95.3 kg (210 lb)   LMP 06/01/2020   SpO2 97%   BMI 42.41 kg/m²     ECOG PS0    Physical Exam:   Gen. Appearance: The patient is in no acute distress. Skin: There is no bruise or rash. HEENT: The exam is unremarkable. Neck: Supple without lymphadenopathy or thyromegaly. Lungs: Clear to auscultation and percussion; there are no wheezes or rhonchi. Heart: Regular rate and rhythm; there are no murmurs, gallops, or rubs. Abdomen: Bowel sounds are present and normal.  There is no guarding, tenderness, or hepatosplenomegaly. Extremities: There is no clubbing, cyanosis, or edema. Neurologic: There are no focal neurologic deficits. Lymphatics: There is no palpable peripheral lymphadenopathy. Musculoskeletal: The patient has full range of motion at all joints. There is no evidence of joint deformity or effusions. There is no focal joint tenderness. Psychological/psychiatric: There is no clinical evidence of anxiety, depression, or melancholy. Lab data:      Results for orders placed or performed during the hospital encounter of 06/26/20   CBC WITH 3 PART DIFF     Status: Abnormal   Result Value Ref Range Status    WBC 4.6 4.5 - 13.0 K/uL Final    RBC 4.93 4.10 - 5.10 M/uL Final    HGB 14.2 12.0 - 16 g/dL Final    HCT 42.9 36 - 48 % Final    MCV 87.0 78 - 102 FL Final    MCH 28.8 25.0 - 35.0 PG Final    MCHC 33.1 31 - 37 g/dL Final    RDW 16.8 (H) 11.5 - 14.5 % Final    PLATELET 601 056 - 982 K/uL Final    NEUTROPHILS 57 40 - 70 % Final    MIXED CELLS 6 0.1 - 17 % Final    LYMPHOCYTES 37 14 - 44 % Final    ABS. NEUTROPHILS 2.6 1.8 - 9.5 K/UL Final    ABS. MIXED CELLS 0.3 0.0 - 2.3 K/uL Final    ABS. LYMPHOCYTES 1.7 1.1 - 5.9 K/UL Final     Comment: Test performed at [address] [place] [address] or [facility]patient [facility] Location. Reviewed by Medical Director. DF AUTOMATED   Final           Assessment:     1. Iron deficiency anemia due to chronic blood loss    2. Vitamin D deficiency        Plan:   Severe anemia: I have inform patient that her CBC show a WBC of 4.6, H/H of 14.2 g/dl and 42.9%. PLT of 215K. She has completed iron infusion in the form of injectafer on 4/1/2020.  At this time a comprehensive metabolic panel, iron profile, ferritin level, will be ordered. Vitamin D Deficiency: Vitamin D level on 2/25/2020 was 17.6, she was provided with 50,000 unit of Vitamin D supplement to take once a week for 12 weeks. Vitamin level will be repeated today. She will continue to follow up with her GYN   GI referral will be made for additional workup of her anemia     Follow up in 12 weeks     Orders Placed This Encounter    COMPLETE CBC & AUTO DIFF WBC    METABOLIC PANEL, COMPREHENSIVE     Standing Status:   Future     Standing Expiration Date:   6/27/2021    VITAMIN D, 25 HYDROXY     Standing Status:   Future     Standing Expiration Date:   7/26/2020    FERRITIN     Standing Status:   Future     Standing Expiration Date:   6/26/2021    IRON PROFILE     Standing Status:   Future     Standing Expiration Date:   6/26/2021    InHouse CBC ([employer])     Standing Status:   Future     Number of Occurrences:   1     Standing Expiration Date:   7/3/2020       Tova March, NP  6/26/2020      Please note: This document has been produced using voice recognition software. Unrecognized errors in transcription may be present.

## 2020-06-26 NOTE — PATIENT INSTRUCTIONS
Learning About Vitamin D Why is it important to get enough vitamin D? Your body needs vitamin D to absorb calcium. Calcium keeps your bones and muscles, including your heart, healthy and strong. If your muscles don't get enough calcium, they can cramp, hurt, or feel weak. You may have long-term (chronic) muscle aches and pains. If you don't get enough vitamin D throughout life, you have an increased chance of having thin and brittle bones (osteoporosis) in your later years. Children who don't get enough vitamin D may not grow as much as others their age. They also have a chance of getting a rare disease called rickets. It causes weak bones. Vitamin D and calcium are added to many foods. And your body uses sunshine to make its own vitamin D. How much vitamin D do you need? The [employer] recommends that people ages 3 through 79 get 600 IU (international units) every day. Adults 71 and older need 800 IU every day. Blood tests for vitamin D can check your vitamin D level. But there is no standard normal range used by all laboratories. You're likely getting enough vitamin D if your levels are in the range of 20 to 50 ng/mL. How can you get more vitamin D? Foods that contain vitamin D include: 
· [place], tuna, and mackerel. These are some of the best foods to eat when you need to get more vitamin D. 
· Cheese, egg yolks, and beef liver. These foods have vitamin D in small amounts. · Milk, soy drinks, orange juice, yogurt, margarine, and some kinds of cereal have vitamin D added to them. Some people don't make vitamin D as well as others. They may have to take extra care in getting enough vitamin D. Things that reduce how much vitamin D your body makes include: · Dark skin, such as many  Americans have. · Age, especially if you are older than 72. · Digestive problems, such as Crohn's or celiac disease. · Liver and kidney disease. Some people who do not get enough vitamin D may need supplements. Are there any risks from taking vitamin D? 
· Too much vitamin D: 
? Can damage your kidneys. ? Can cause nausea and vomiting, constipation, and weakness. ? Raises the amount of calcium in your blood. If this happens, you can get confused or have an irregular heart rhythm. · Vitamin D may interact with other medicines. Tell your doctor about all of the medicines you take, including over-the-counter drugs, herbs, and pills. Tell your doctor about all of your current medical problems. Where can you learn more? Go to http://dilmaNoquoshana.info/ Enter [id number] in the search box to learn more about \"Learning About Vitamin D.\" 
Current as of: August 22, 2019               Content Version: 12.5 © 2808-9183 [employer]. Care instructions adapted under license by [employer] (which disclaims liability or warranty for this information). If you have questions about a medical condition or this instruction, always ask your healthcare professional. [place] [address] any warranty or liability for your use of this information. Iron-Rich Diet: Care Instructions Your Care Instructions Your body needs iron to make hemoglobin. Hemoglobin is a substance in red blood cells that carries oxygen from the lungs to cells all through your body. If you do not get enough iron, your body makes fewer and smaller red blood cells. As a result, your body's cells may not get enough oxygen. Adult men need 8 milligrams of iron a day; adult women need 18 milligrams of iron a day. After menopause, women need 8 milligrams of iron a day. A pregnant woman needs 27 milligrams of iron a day. Infants and young children have higher iron needs relative to their size than other age groups. People who have lost blood because of ulcers or heavy menstrual periods may become very low in iron and may develop anemia. Most people can get the iron their bodies need by eating enough of certain iron-rich foods. Your doctor may recommend that you take an iron supplement along with eating an iron-rich diet. Follow-up care is a key part of your treatment and safety. Be sure to make and go to all appointments, and call your doctor if you are having problems. It's also a good idea to know your test results and keep a list of the medicines you take. How can you care for yourself at home? · Make iron-rich foods a part of your daily diet. Iron-rich foods include: ? All meats, such as chicken, beef, lamb, pork, fish, and shellfish. Liver is especially high in iron. ? Leafy green vegetables. ? Raisins, peas, beans, lentils, barley, and eggs. ? Iron-fortified breakfast cereals. · Eat foods with vitamin C along with iron-rich foods. Vitamin C helps you absorb more iron from food. Drink a glass of orange juice or another citrus juice with your food. · Eat meat and vegetables or grains together. The iron in meat helps your body absorb the iron in other foods. Where can you learn more? Go to http://dilmaOcscshana.info/ Enter 0328 [id number] in the search box to learn more about \"Iron-Rich Diet: Care Instructions. \" Current as of: August 22, 2019               Content Version: 12.5 © 7166-7375 [employer]. Care instructions adapted under license by [employer] (which disclaims liability or warranty for this information). If you have questions about a medical condition or this instruction, always ask your healthcare professional. [place] [address] any warranty or liability for your use of this information. Iron Deficiency Anemia: Care Instructions Your Care Instructions Anemia means that you don't have enough red blood cells. Red blood cells carry oxygen around your body. When you have anemia, it can make you pale, weak, and tired. Many things can cause anemia. The most common cause is loss of blood. This can happen if you have heavy menstrual periods. It can also happen if you have bleeding in your stomach or bowel. You can also get anemia if you don't have enough iron in your diet or if it's hard for your body to absorb iron. In some cases, pregnancy causes anemia. That's because a pregnant woman needs more iron. Your doctor may do more tests to find the cause of your anemia. If a disease or other health problem is causing it, your doctor will treat that problem. It's important to follow up with your doctor to make sure that your iron level returns to normal. 
Follow-up care is a key part of your treatment and safety. Be sure to make and go to all appointments, and call your doctor if you are having problems. It's also a good idea to know your test results and keep a list of the medicines you take. How can you care for yourself at home? · If your doctor recommended iron pills, take them as directed. ? Try to take the pills on an empty stomach. You can do this about 1 hour before or 2 hours after meals. But you may need to take iron with food to avoid an upset stomach. ? Do not take antacids or drink milk or anything with caffeine within 2 hours of when you take your iron. They can keep your body from absorbing the iron well. ? Vitamin C helps your body absorb iron. You may want to take iron pills with a glass of orange juice or some other food high in vitamin C. 
? Iron pills may cause stomach problems. These include heartburn, nausea, diarrhea, constipation, and cramps. It can help to drink plenty of fluids and include fruits, vegetables, and fiber in your diet. ? It's normal for iron pills to make your stool a greenish or grayish black. But internal bleeding can also cause dark stool. So it's important to tell your doctor about any color changes.  
? Call your doctor if you think you are having a problem with your iron pills. Even after you start to feel better, it will take several months for your body to build up its supply of iron. ? If you miss a pill, don't take a double dose. ? Keep iron pills out of the reach of small children. Too much iron can be very dangerous. · Eat foods with a lot of iron. These include red meat, shellfish, poultry, and eggs. They also include beans, raisins, whole-grain bread, and leafy green vegetables. · Steam your vegetables. This is the best way to prepare them if you want to get as much iron as possible. · Be safe with medicines. Do not take nonsteroidal anti-inflammatory pain relievers unless your doctor tells you to. These include aspirin, naproxen (Aleve), and ibuprofen (Advil, Motrin). · Liquid iron can stain your teeth. But you can mix it with water or juice and drink it with a straw. Then it won't get on your teeth. When should you call for help? [id number] anytime you think you may need emergency care. For example, call if: 
· You passed out (lost consciousness). Call your doctor now or seek immediate medical care if: 
· You are short of breath. · You are dizzy or light-headed, or you feel like you may faint. · You have new or worse bleeding. Watch closely for changes in your health, and be sure to contact your doctor if: 
· You feel weaker or more tired than usual. 
· You do not get better as expected. Where can you learn more? Go to http://dilma-shana.info/ Enter A379 in the search box to learn more about \"Iron Deficiency Anemia: Care Instructions. \" Current as of: November 8, 2019               Content Version: 12.5 © 4449-4570 [employer]. Care instructions adapted under license by [employer] (which disclaims liability or warranty for this information).  If you have questions about a medical condition or this instruction, always ask your healthcare professional. See [employer] disclaims any warranty or liability for your use of this information.

## 2020-10-22 ENCOUNTER — HOSPITAL ENCOUNTER (OUTPATIENT)
Dept: LAB | Age: 35
Discharge: HOME | End: 2020-10-22
Payer: COMMERCIAL

## 2020-10-22 ENCOUNTER — LAB ONLY (OUTPATIENT)
Dept: ONCOLOGY | Age: 35
End: 2020-10-22

## 2020-10-22 DIAGNOSIS — D50.0 IRON DEFICIENCY ANEMIA DUE TO CHRONIC BLOOD LOSS: ICD-10-CM

## 2020-10-22 DIAGNOSIS — D50.0 IRON DEFICIENCY ANEMIA DUE TO CHRONIC BLOOD LOSS: Primary | ICD-10-CM

## 2020-10-22 LAB
ALBUMIN SERPL-MCNC: 3.7 G/DL (ref 3.4–5)
ALBUMIN/GLOB SERPL: 1.1 {RATIO} (ref 0.8–1.7)
ALP SERPL-CCNC: 108 U/L (ref 45–117)
ALT SERPL-CCNC: 39 U/L (ref 13–56)
ANION GAP SERPL CALC-SCNC: 5 MMOL/L (ref 3–18)
AST SERPL-CCNC: 20 U/L (ref 10–38)
BASOPHILS # BLD: 0 K/UL (ref 0–0.1)
BASOPHILS NFR BLD: 0 % (ref 0–2)
BILIRUB SERPL-MCNC: 0.5 MG/DL (ref 0.2–1)
BUN SERPL-MCNC: 22 MG/DL (ref 7–18)
BUN/CREAT SERPL: 26 (ref 12–20)
CALCIUM SERPL-MCNC: 8.7 MG/DL (ref 8.5–10.1)
CHLORIDE SERPL-SCNC: 108 MMOL/L (ref 100–111)
CO2 SERPL-SCNC: 26 MMOL/L (ref 21–32)
CREAT SERPL-MCNC: 0.84 MG/DL (ref 0.6–1.3)
DIFFERENTIAL METHOD BLD: ABNORMAL
EOSINOPHIL # BLD: 0.1 K/UL (ref 0–0.4)
EOSINOPHIL NFR BLD: 2 % (ref 0–5)
ERYTHROCYTE [DISTWIDTH] IN BLOOD BY AUTOMATED COUNT: 13.7 % (ref 11.6–14.5)
FERRITIN SERPL-MCNC: 4 NG/ML (ref 8–388)
GLOBULIN SER CALC-MCNC: 3.5 G/DL (ref 2–4)
GLUCOSE SERPL-MCNC: 84 MG/DL (ref 74–99)
HCT VFR BLD AUTO: 36.2 % (ref 35–45)
HGB BLD-MCNC: 11.2 G/DL (ref 12–16)
IRON SATN MFR SERPL: 5 % (ref 20–50)
IRON SERPL-MCNC: 19 UG/DL (ref 50–175)
LYMPHOCYTES # BLD: 2 K/UL (ref 0.9–3.6)
LYMPHOCYTES NFR BLD: 32 % (ref 21–52)
MCH RBC QN AUTO: 25.6 PG (ref 24–34)
MCHC RBC AUTO-ENTMCNC: 30.9 G/DL (ref 31–37)
MCV RBC AUTO: 82.6 FL (ref 74–97)
MONOCYTES # BLD: 0.5 K/UL (ref 0.05–1.2)
MONOCYTES NFR BLD: 7 % (ref 3–10)
NEUTS SEG # BLD: 3.8 K/UL (ref 1.8–8)
NEUTS SEG NFR BLD: 59 % (ref 40–73)
PLATELET # BLD AUTO: 335 K/UL (ref 135–420)
PMV BLD AUTO: 10.9 FL (ref 9.2–11.8)
POTASSIUM SERPL-SCNC: 4.3 MMOL/L (ref 3.5–5.5)
PROT SERPL-MCNC: 7.2 G/DL (ref 6.4–8.2)
RBC # BLD AUTO: 4.38 M/UL (ref 4.2–5.3)
SODIUM SERPL-SCNC: 139 MMOL/L (ref 136–145)
TIBC SERPL-MCNC: 419 UG/DL (ref 250–450)
WBC # BLD AUTO: 6.4 K/UL (ref 4.6–13.2)

## 2020-10-22 PROCEDURE — 82728 ASSAY OF FERRITIN: CPT

## 2020-10-22 PROCEDURE — 80053 COMPREHEN METABOLIC PANEL: CPT

## 2020-10-22 PROCEDURE — 85025 COMPLETE CBC W/AUTO DIFF WBC: CPT

## 2020-10-22 PROCEDURE — 83540 ASSAY OF IRON: CPT

## 2020-10-22 PROCEDURE — 36415 COLL VENOUS BLD VENIPUNCTURE: CPT

## 2020-11-09 ENCOUNTER — OFFICE VISIT (OUTPATIENT)
Dept: ONCOLOGY | Age: 35
End: 2020-11-09
Payer: COMMERCIAL

## 2020-11-09 VITALS
RESPIRATION RATE: 18 BRPM | SYSTOLIC BLOOD PRESSURE: 118 MMHG | WEIGHT: 207 LBS | TEMPERATURE: 99 F | BODY MASS INDEX: 41.81 KG/M2 | HEART RATE: 84 BPM | DIASTOLIC BLOOD PRESSURE: 81 MMHG | OXYGEN SATURATION: 98 %

## 2020-11-09 DIAGNOSIS — D50.0 IRON DEFICIENCY ANEMIA DUE TO CHRONIC BLOOD LOSS: Primary | ICD-10-CM

## 2020-11-09 PROCEDURE — 99214 OFFICE O/P EST MOD 30 MIN: CPT | Performed by: INTERNAL MEDICINE

## 2020-11-09 NOTE — PROGRESS NOTES
Hematology/Oncology  Progress Note    Name: Muna Pandya  Date: 2020  : 1985    Delphine Moss MD     Ms. Peri Alejo is a 27y.o. year old female who was seen for management of her Iron deficiency anemia. Current therapy: completed injectafer on 2020   Subjective:   Ms. Peri Alejo is a 53-year-old woman who is referred for evaluation of anemia on 2020. On 2020 the CBC revealed a WBC count of 5.9, hemoglobin was 9.4 g/dL, hematocrit 33.6%, and the platelet count was 778,972. The comprehensive metabolic panel revealed normal values except for slightly elevated BUN of 20 and a BUN/creatinine ratio of 22. A total bilirubin was slightly elevated at 1.1 iron profile showed an iron level of 18 mcg/dL with an iron saturation of only 4%. However her ferritin was markedly decreased at only 4 ng/mL consistent with severe iron deficiency anemia. She was ordered treatment for the iron deficiency anemia with Injectafer at a dose of 750 mg given over 1 hour for 2 doses and this was completed on 2020. Additionally as a confirmation of her severe iron deficiency anemia erythropoietin level was elevated at 127.4. Vitamin D level was markedly diminished at 17.6 consistent with vitamin D deficiency which is classified as a vitamin D level less than 20 ng/mL. Today she in the office for follow. She continues to have heavy periods. She has f/u with GYN for possible procedure. She denied fever, chills, night sweat, unintentional weight loss, skin lumps or bumps, acute bleeding or bruising issues at this time. Denied headache, acute vision change, dizziness, chest pain, worsen shortness of breath, palpitation, productive cough, nausea, vomiting, abdominal pain, altered bowel habits, dysuria, new bone pain or back pain, focal numbness or weakness.          History of present illness:  Ms. Peri Alejo is a 53-year-old -American woman who has a long history of anemia most likely iron deficiency related to chronic blood loss from heavy menstrual bleeding. The patient reports that H 15 she underwent a transfusion of packed red blood cells. She has subsequently undergone a history of D&C. She also had a prior spinal tap related to her anemia which had been causing what she feels was pressure on her brain. This was done to relieve pain. She complains of feeling tired and weak most of the time. On January 16, 2020 she had a transfusion of 2 units of packed red blood cells at a time when her hemoglobin was found to be quite low at about 6.2 g/dL. She reports having heavy menstrual bleeding each month lasting for about 5 days. Usually the first 3 to 4 days are quite heavy. She was started on birth control pills recently as a means of controlling her excessive menstrual bleeding but the total number of days increased to about 14 days of actual menstrual bleeding. Past medical history, family history, and social history: these were reviewed and remains unchanged.     Past Medical History:   Diagnosis Date    Adverse effect of anesthesia     difficulty getting pt. to sleep    Anemia     Fibroids     Functional heart murmur     GERD (gastroesophageal reflux disease)     Heavy menstrual bleeding     Iron deficiency     Ovarian cyst     Pelvic pain     Polyp of cervix      Past Surgical History:   Procedure Laterality Date    HX WISDOM TEETH EXTRACTION       Social History     Socioeconomic History    Marital status: SINGLE     Spouse name: Not on file    Number of children: Not on file    Years of education: Not on file    Highest education level: Not on file   Occupational History    Not on file   Social Needs    Financial resource strain: Not on file    Food insecurity     Worry: Not on file     Inability: Not on file    Transportation needs     Medical: Not on file     Non-medical: Not on file   Tobacco Use    Smoking status: Former Smoker     Last attempt to quit: 4/8/2017     Years since quitting: 3.5    Smokeless tobacco: Never Used   Substance and Sexual Activity    Alcohol use: Yes     Comment: rare    Drug use: No    Sexual activity: Not Currently   Lifestyle    Physical activity     Days per week: Not on file     Minutes per session: Not on file    Stress: Not on file   Relationships    Social connections     Talks on phone: Not on file     Gets together: Not on file     Attends [employer] service: Not on file     Active member of club or organization: Not on file     Attends meetings of clubs or organizations: Not on file     Relationship status: Not on file    Intimate partner violence     Fear of current or ex partner: Not on file     Emotionally abused: Not on file     Physically abused: Not on file     Forced sexual activity: Not on file   Other Topics Concern     Service Not Asked    Blood Transfusions Not Asked    Caffeine Concern Not Asked    Occupational Exposure Not Asked   [employer] [place] Hazards Not Asked    Sleep Concern Not Asked    Stress Concern Not Asked    Weight Concern Not Asked    Special Diet Not Asked    Back Care Not Asked    Exercise Not Asked    Bike Helmet Not Asked    Seat Belt Not Asked    Self-Exams Not Asked   Social History Narrative    Not on file     Family History   Problem Relation Age of Onset    Depression Mother     Heart Disease Father     Hypertension Father     Hypertension Sister     Hypertension Sister      Current Outpatient Medications   Medication Sig Dispense Refill    ergocalciferol (ERGOCALCIFEROL) 1,250 mcg (50,000 unit) capsule Take 1 Cap by mouth every seven (7) days for 36 doses. 12 Cap 2    norethindrone-ethinyl estradiol (Junel FE 1/20, 28,) 1 mg-20 mcg (21)/75 mg (7) tab Take 1 Tab by mouth daily.  bisacodyL (DULCOLAX, BISACODYL,) 5 mg EC tablet Take 5 mg by mouth daily as needed for Constipation.  ibuprofen (MOTRIN) 800 mg tablet Take 800 mg by mouth every eight (8) hours as needed for Pain.  naproxen sodium (ALEVE) 220 mg cap Take  by mouth.  ferrous sulfate, dried (IRON, DRIED, PO) Take 60 mg by mouth two (2) times a day.  raNITIdine (ZANTAC) 75 mg tab Take  by mouth daily as needed. Review of Systems  Constitutional: The patient has no acute distress or discomfort. HEENT: The patient denies recent head trauma, eye pain, blurred vision,  hearing deficit, oropharyngeal mucosal pain or lesions, and the patient denies throat pain or discomfort. Lymphatics: The patient denies palpable peripheral lymphadenopathy. Hematologic: The patient denies having bruising, bleeding, or progressive fatigue. Respiratory: Patient denies having shortness of breath, cough, sputum production, fever, or dyspnea on exertion. Cardiovascular: The patient denies having leg pain, leg swelling, heart palpitations, chest permit, chest pain, or lightheadedness. The patient denies having dyspnea on exertion. Gastrointestinal: The patient denies having nausea, emesis, or diarrhea. The patient denies having any hematemesis or blood in the stool. Genitourinary: Patient denies having urinary urgency, frequency, or dysuria. The patient denies having blood in the urine. Psychological: The patient denies having symptoms of nervousness, anxiety, depression, or thoughts of harming himself some of this. Skin: Patient denies having skin rashes, skin, ulcerations, or unexplained itching or pruritus. Musculoskeletal: The patient denies having pain in the joints or bones. Objective:     Visit Vitals  /81 (BP Patient Position: Sitting)   Pulse 84   Temp 99 °F (37.2 °C) (Oral)   Resp 18   Wt 93.9 kg (207 lb)   SpO2 98%   BMI 41.81 kg/m²     ECOG PS0    Physical Exam:   Gen. Appearance: The patient is in no acute distress. Skin: There is no bruise or rash. HEENT: The exam is unremarkable. Neck: Supple without lymphadenopathy or thyromegaly.   Lungs: Clear to auscultation and percussion; there are no wheezes or rhonchi. Heart: Regular rate and rhythm; there are no murmurs, gallops, or rubs. Abdomen: Bowel sounds are present and normal.  There is no guarding, tenderness, or hepatosplenomegaly. Extremities: There is no clubbing, cyanosis, or edema. Neurologic: There are no focal neurologic deficits. Lymphatics: There is no palpable peripheral lymphadenopathy. Musculoskeletal: The patient has full range of motion at all joints. There is no evidence of joint deformity or effusions. There is no focal joint tenderness. Psychological/psychiatric: There is no clinical evidence of anxiety, depression, or melancholy. Lab data:      Results for orders placed or performed during the hospital encounter of 06/26/20   CBC WITH 3 PART DIFF     Status: Abnormal   Result Value Ref Range Status    WBC 4.6 4.5 - 13.0 K/uL Final    RBC 4.93 4.10 - 5.10 M/uL Final    HGB 14.2 12.0 - 16 g/dL Final    HCT 42.9 36 - 48 % Final    MCV 87.0 78 - 102 FL Final    MCH 28.8 25.0 - 35.0 PG Final    MCHC 33.1 31 - 37 g/dL Final    RDW 16.8 (H) 11.5 - 14.5 % Final    PLATELET 108 899 - 090 K/uL Final    NEUTROPHILS 57 40 - 70 % Final    MIXED CELLS 6 0.1 - 17 % Final    LYMPHOCYTES 37 14 - 44 % Final    ABS. NEUTROPHILS 2.6 1.8 - 9.5 K/UL Final    ABS. MIXED CELLS 0.3 0.0 - 2.3 K/uL Final    ABS. LYMPHOCYTES 1.7 1.1 - 5.9 K/UL Final     Comment: Test performed at [address] [place] [address] or Outpatient [facility]fusion [facility] Location. Reviewed by Medical Director. DF AUTOMATED   Final           Assessment:     1. Iron deficiency anemia due to chronic blood loss        Plan:   Iron deficiency anemia:  -- She has completed iron infusion in the form of injectafer on 4/1/2020.   -- 10/28/2020 CBC reported hemoglobin of 11.2, hematocrit of 36.2%, platelet of [phone]. Iron profile showed iron saturation of 5% and ferritin was 4.  -- She will continue to follow up with her GYN for menorrhagia.    -- We will plan for IV Injectafer x 2, 1 week apart. -- We will repeat CBC, CMP, Iron profiles, ferritin in 6-8 weeks port IV Iron. Vitamin D Deficiency:   -- She will follows up her PCP for further replacement. -- We will see the patient back in clinic in about 3 months. Always sooner if required. The patient can have lab done prior to our next clinic visit. No orders of the defined types were placed in this encounter. Ms. Yasmeen King has a reminder for a \"due or due soon\" health maintenance. I have asked that she contact her primary care provider for follow-up on this health maintenance. All of patient's questions answered to their apparent satisfaction. They verbally show understanding and agreement with aforementioned plan. Emerson Collins MD  11/9/2020          About 25 minutes were spent for this encounter with more than 50% of the time spent in face-to-face counseling, discussing on diagnosis and management plan going forward, and co-ordination of care. Parts of this document has been produced using Dragon dictation system. Unrecognized errors in transcription may be present. Please do not hesitate to reach out for any questions or clarifications.       CC: Pola Barba MD

## 2021-02-23 ENCOUNTER — HOSPITAL ENCOUNTER (OUTPATIENT)
Dept: LAB | Age: 36
Discharge: HOME | End: 2021-02-23
Payer: COMMERCIAL

## 2021-02-23 ENCOUNTER — LAB ONLY (OUTPATIENT)
Dept: ONCOLOGY | Age: 36
End: 2021-02-23

## 2021-02-23 DIAGNOSIS — D50.0 IRON DEFICIENCY ANEMIA DUE TO CHRONIC BLOOD LOSS: Primary | ICD-10-CM

## 2021-02-23 DIAGNOSIS — E55.9 VITAMIN D DEFICIENCY: ICD-10-CM

## 2021-02-23 DIAGNOSIS — D50.0 IRON DEFICIENCY ANEMIA DUE TO CHRONIC BLOOD LOSS: ICD-10-CM

## 2021-02-23 LAB
ALBUMIN SERPL-MCNC: 3.9 G/DL (ref 3.4–5)
ALBUMIN/GLOB SERPL: 1.2 {RATIO} (ref 0.8–1.7)
ALP SERPL-CCNC: 68 U/L (ref 45–117)
ALT SERPL-CCNC: 19 U/L (ref 13–56)
ANION GAP SERPL CALC-SCNC: 9 MMOL/L (ref 3–18)
AST SERPL-CCNC: 13 U/L (ref 10–38)
BASOPHILS # BLD: 0 K/UL (ref 0–0.1)
BASOPHILS NFR BLD: 1 % (ref 0–2)
BILIRUB SERPL-MCNC: 0.9 MG/DL (ref 0.2–1)
BUN SERPL-MCNC: 21 MG/DL (ref 7–18)
BUN/CREAT SERPL: 26 (ref 12–20)
CALCIUM SERPL-MCNC: 9.1 MG/DL (ref 8.5–10.1)
CHLORIDE SERPL-SCNC: 109 MMOL/L (ref 100–111)
CO2 SERPL-SCNC: 23 MMOL/L (ref 21–32)
CREAT SERPL-MCNC: 0.82 MG/DL (ref 0.6–1.3)
DIFFERENTIAL METHOD BLD: ABNORMAL
EOSINOPHIL # BLD: 0.1 K/UL (ref 0–0.4)
EOSINOPHIL NFR BLD: 1 % (ref 0–5)
ERYTHROCYTE [DISTWIDTH] IN BLOOD BY AUTOMATED COUNT: 17.4 % (ref 11.6–14.5)
FERRITIN SERPL-MCNC: 4 NG/ML (ref 8–388)
GLOBULIN SER CALC-MCNC: 3.3 G/DL (ref 2–4)
GLUCOSE SERPL-MCNC: 85 MG/DL (ref 74–99)
HCT VFR BLD AUTO: 31.9 % (ref 35–45)
HGB BLD-MCNC: 9.1 G/DL (ref 12–16)
IRON SATN MFR SERPL: 6 % (ref 20–50)
IRON SERPL-MCNC: 25 UG/DL (ref 50–175)
LYMPHOCYTES # BLD: 1.7 K/UL (ref 0.9–3.6)
LYMPHOCYTES NFR BLD: 30 % (ref 21–52)
MCH RBC QN AUTO: 20.6 PG (ref 24–34)
MCHC RBC AUTO-ENTMCNC: 28.5 G/DL (ref 31–37)
MCV RBC AUTO: 72.3 FL (ref 74–97)
MONOCYTES # BLD: 0.4 K/UL (ref 0.05–1.2)
MONOCYTES NFR BLD: 6 % (ref 3–10)
NEUTS SEG # BLD: 3.4 K/UL (ref 1.8–8)
NEUTS SEG NFR BLD: 62 % (ref 40–73)
PLATELET # BLD AUTO: 202 K/UL (ref 135–420)
PMV BLD AUTO: 10.3 FL (ref 9.2–11.8)
POTASSIUM SERPL-SCNC: 4.1 MMOL/L (ref 3.5–5.5)
PROT SERPL-MCNC: 7.2 G/DL (ref 6.4–8.2)
RBC # BLD AUTO: 4.41 M/UL (ref 4.2–5.3)
SODIUM SERPL-SCNC: 141 MMOL/L (ref 136–145)
TIBC SERPL-MCNC: 430 UG/DL (ref 250–450)
WBC # BLD AUTO: 5.6 K/UL (ref 4.6–13.2)

## 2021-02-23 PROCEDURE — 82728 ASSAY OF FERRITIN: CPT

## 2021-02-23 PROCEDURE — 36415 COLL VENOUS BLD VENIPUNCTURE: CPT

## 2021-02-23 PROCEDURE — 83540 ASSAY OF IRON: CPT

## 2021-02-23 PROCEDURE — 85025 COMPLETE CBC W/AUTO DIFF WBC: CPT

## 2021-02-23 PROCEDURE — 80053 COMPREHEN METABOLIC PANEL: CPT

## 2021-03-11 ENCOUNTER — TELEPHONE (OUTPATIENT)
Dept: ONCOLOGY | Age: 36
End: 2021-03-11

## 2021-03-11 ENCOUNTER — VIRTUAL VISIT (OUTPATIENT)
Dept: ONCOLOGY | Age: 36
End: 2021-03-11
Payer: COMMERCIAL

## 2021-03-11 DIAGNOSIS — D50.0 IRON DEFICIENCY ANEMIA DUE TO CHRONIC BLOOD LOSS: Primary | ICD-10-CM

## 2021-03-11 PROCEDURE — 99213 OFFICE O/P EST LOW 20 MIN: CPT | Performed by: NURSE PRACTITIONER

## 2021-03-11 RX ORDER — DIPHENHYDRAMINE HYDROCHLORIDE 50 MG/ML
25 INJECTION, SOLUTION INTRAMUSCULAR; INTRAVENOUS AS NEEDED
Status: CANCELLED
Start: 2021-03-18

## 2021-03-11 RX ORDER — ACETAMINOPHEN 325 MG/1
650 TABLET ORAL AS NEEDED
Status: CANCELLED
Start: 2021-03-18

## 2021-03-11 RX ORDER — ONDANSETRON 2 MG/ML
8 INJECTION INTRAMUSCULAR; INTRAVENOUS AS NEEDED
Status: CANCELLED | OUTPATIENT
Start: 2021-03-18

## 2021-03-11 RX ORDER — HEPARIN 100 UNIT/ML
300-500 SYRINGE INTRAVENOUS AS NEEDED
Status: CANCELLED
Start: 2021-03-18

## 2021-03-11 RX ORDER — DIPHENHYDRAMINE HYDROCHLORIDE 50 MG/ML
50 INJECTION, SOLUTION INTRAMUSCULAR; INTRAVENOUS AS NEEDED
Status: CANCELLED
Start: 2021-03-18

## 2021-03-11 RX ORDER — ALBUTEROL SULFATE 0.83 MG/ML
2.5 SOLUTION RESPIRATORY (INHALATION) AS NEEDED
Status: CANCELLED
Start: 2021-03-18

## 2021-03-11 RX ORDER — SODIUM CHLORIDE 0.9 % (FLUSH) 0.9 %
10 SYRINGE (ML) INJECTION AS NEEDED
Status: CANCELLED | OUTPATIENT
Start: 2021-03-18

## 2021-03-11 RX ORDER — SODIUM CHLORIDE 9 MG/ML
25 INJECTION, SOLUTION INTRAVENOUS CONTINUOUS
Status: CANCELLED | OUTPATIENT
Start: 2021-03-18

## 2021-03-11 RX ORDER — SODIUM CHLORIDE 9 MG/ML
10 INJECTION INTRAMUSCULAR; INTRAVENOUS; SUBCUTANEOUS AS NEEDED
Status: CANCELLED | OUTPATIENT
Start: 2021-03-18

## 2021-03-11 RX ORDER — EPINEPHRINE 1 MG/ML
0.3 INJECTION, SOLUTION, CONCENTRATE INTRAVENOUS AS NEEDED
Status: CANCELLED | OUTPATIENT
Start: 2021-03-18

## 2021-03-11 RX ORDER — HYDROCORTISONE SODIUM SUCCINATE 100 MG/2ML
100 INJECTION, POWDER, FOR SOLUTION INTRAMUSCULAR; INTRAVENOUS AS NEEDED
Status: CANCELLED | OUTPATIENT
Start: 2021-03-18

## 2021-03-11 NOTE — TELEPHONE ENCOUNTER
Patient contacted office and forgot to address if she could take liquid iron during her virtual appt. Please contact her.

## 2021-03-11 NOTE — PROGRESS NOTES
Harvis Leventhal is a 38 y.o. female, evaluated via audio-only technology on 3/11/2021 for Follow-up  . Assessment & Plan:   Iron deficiency anemia:  -- She has completed iron infusion in the form of injectafer on 4/1/2020.   -- 2/23/2021 CBC reported hemoglobin of 9.1, hematocrit of 31.9%, platelet of 408,967. Iron profile showed iron saturation of 6% and ferritin was 4.  -- She will continue to follow up with her GYN for menorrhagia. -- We will plan for IV Injectafer x 2, 1 week apart. -- We will repeat CBC, CMP, Iron profiles, ferritin in 8-12 weeks port IV Iron. -- She will also be refereed to GI for follow up      Vitamin D Deficiency:   -- She will follows up her PCP for further replacement    12  Subjective:   Ms. Oma Pringle is a 44-year-old woman who is seen for anemia since 2/25/2020. Julianna Payton was ordered treatment for the iron deficiency anemia with Injectafer at a dose of 750 mg given over 1 hour for 2 doses and this was completed on 4/1/2020.  Additionally as a confirmation of her severe iron deficiency anemia erythropoietin level was elevated at 127.4.  Vitamin D level was markedly diminished at 17.6 consistent with vitamin D deficiency which is classified as a vitamin D level less than 20 ng/mL.      She continues to have heavy periods. She has f/u with GYN for possible procedure. She denied fever, chills, night sweat, unintentional weight loss, skin lumps or bumps, acute bleeding or bruising issues at this time. Denied headache, acute vision change, dizziness, chest pain, worsen shortness of breath, palpitation, productive cough, nausea, vomiting, abdominal pain, altered bowel habits, dysuria, new bone pain or back pain, focal numbness or weakness. She denies any pregnancy at this time. Prior to Admission medications    Medication Sig Start Date End Date Taking? Authorizing Provider   norethindrone-ethinyl estradiol (Junel FE 1/20, 28,) 1 mg-20 mcg (21)/75 mg (7) tab Take 1 Tab by mouth daily. Provider, Historical   bisacodyL (DULCOLAX, BISACODYL,) 5 mg EC tablet Take 5 mg by mouth daily as needed for Constipation. Provider, Historical   ibuprofen (MOTRIN) 800 mg tablet Take 800 mg by mouth every eight (8) hours as needed for Pain. Provider, Historical   naproxen sodium (ALEVE) 220 mg cap Take  by mouth. Provider, Historical   ferrous sulfate, dried (IRON, DRIED, PO) Take 60 mg by mouth two (2) times a day. Provider, Historical   raNITIdine (ZANTAC) 75 mg tab Take  by mouth daily as needed. Provider, Historical         Review of Systems   Constitutional: Negative. HENT: Negative. Eyes: Negative. Respiratory: Negative. Cardiovascular: Negative. Gastrointestinal: Negative. Genitourinary: Negative. Musculoskeletal: Negative. Skin: Negative. Neurological: Negative. Endo/Heme/Allergies: Negative. Psychiatric/Behavioral: Negative. Patient-Reported Vitals 3/11/2021   Patient-Reported Weight 189lb        Del Bryant, who was evaluated through a patient-initiated, synchronous (real-time) audio only encounter, and/or her healthcare decision maker, is aware that it is a billable service, with coverage as determined by her insurance carrier. She provided verbal consent to proceed: YES-Consent obtained within past 12 months: Yes. . She has not had a related appointment within my department in the past 7 days or scheduled within the next 24 hours.       Total Time: 20 minutes    Contreras Rosenthal NP

## 2021-03-18 ENCOUNTER — HOSPITAL ENCOUNTER (OUTPATIENT)
Dept: INFUSION THERAPY | Age: 36
End: 2021-03-18

## 2021-03-18 DIAGNOSIS — D50.0 IRON DEFICIENCY ANEMIA DUE TO CHRONIC BLOOD LOSS: ICD-10-CM

## 2021-03-19 ENCOUNTER — DOCUMENTATION ONLY (OUTPATIENT)
Dept: ONCOLOGY | Age: 36
End: 2021-03-19

## 2021-03-19 DIAGNOSIS — D50.0 IRON DEFICIENCY ANEMIA DUE TO CHRONIC BLOOD LOSS: Primary | ICD-10-CM

## 2021-03-19 DIAGNOSIS — D50.0 IRON DEFICIENCY ANEMIA DUE TO CHRONIC BLOOD LOSS: ICD-10-CM

## 2021-03-19 RX ORDER — HYDROCORTISONE SODIUM SUCCINATE 100 MG/2ML
100 INJECTION, POWDER, FOR SOLUTION INTRAMUSCULAR; INTRAVENOUS AS NEEDED
Status: CANCELLED | OUTPATIENT
Start: 2021-03-19

## 2021-03-19 RX ORDER — ACETAMINOPHEN 325 MG/1
650 TABLET ORAL AS NEEDED
Status: CANCELLED
Start: 2021-03-19

## 2021-03-19 RX ORDER — HEPARIN 100 UNIT/ML
300-500 SYRINGE INTRAVENOUS AS NEEDED
Status: CANCELLED
Start: 2021-03-19

## 2021-03-19 RX ORDER — SODIUM CHLORIDE 0.9 % (FLUSH) 0.9 %
10 SYRINGE (ML) INJECTION AS NEEDED
Status: CANCELLED | OUTPATIENT
Start: 2021-03-19

## 2021-03-19 RX ORDER — ONDANSETRON 2 MG/ML
8 INJECTION INTRAMUSCULAR; INTRAVENOUS AS NEEDED
Status: CANCELLED | OUTPATIENT
Start: 2021-03-19

## 2021-03-19 RX ORDER — DIPHENHYDRAMINE HYDROCHLORIDE 50 MG/ML
25 INJECTION, SOLUTION INTRAMUSCULAR; INTRAVENOUS AS NEEDED
Status: CANCELLED
Start: 2021-03-19

## 2021-03-19 RX ORDER — EPINEPHRINE 1 MG/ML
0.3 INJECTION, SOLUTION, CONCENTRATE INTRAVENOUS AS NEEDED
Status: CANCELLED | OUTPATIENT
Start: 2021-03-19

## 2021-03-19 RX ORDER — DIPHENHYDRAMINE HYDROCHLORIDE 50 MG/ML
50 INJECTION, SOLUTION INTRAMUSCULAR; INTRAVENOUS AS NEEDED
Status: CANCELLED
Start: 2021-03-19

## 2021-03-19 RX ORDER — SODIUM CHLORIDE 9 MG/ML
25 INJECTION, SOLUTION INTRAVENOUS CONTINUOUS
Status: CANCELLED | OUTPATIENT
Start: 2021-03-19

## 2021-03-19 RX ORDER — SODIUM CHLORIDE 9 MG/ML
10 INJECTION INTRAMUSCULAR; INTRAVENOUS; SUBCUTANEOUS AS NEEDED
Status: CANCELLED | OUTPATIENT
Start: 2021-03-19

## 2021-03-19 RX ORDER — ALBUTEROL SULFATE 0.83 MG/ML
2.5 SOLUTION RESPIRATORY (INHALATION) AS NEEDED
Status: CANCELLED
Start: 2021-03-19

## 2021-03-19 NOTE — PROGRESS NOTES
Doctor with [facility] called today, stating that the patient will need to have failed on of the three drugs (Venofer, Infed or ferrous sulfate) before approving Injectafer.    A letter will be sent to patient and our office

## 2021-03-25 ENCOUNTER — HOSPITAL ENCOUNTER (OUTPATIENT)
Dept: INFUSION THERAPY | Age: 36
Discharge: HOME | End: 2021-03-25
Payer: COMMERCIAL

## 2021-03-25 VITALS
SYSTOLIC BLOOD PRESSURE: 112 MMHG | DIASTOLIC BLOOD PRESSURE: 77 MMHG | HEART RATE: 77 BPM | WEIGHT: 189 LBS | TEMPERATURE: 98.1 F | OXYGEN SATURATION: 100 % | RESPIRATION RATE: 16 BRPM | HEIGHT: 59 IN | BODY MASS INDEX: 38.1 KG/M2

## 2021-03-25 DIAGNOSIS — D50.0 IRON DEFICIENCY ANEMIA DUE TO CHRONIC BLOOD LOSS: ICD-10-CM

## 2021-03-25 DIAGNOSIS — D50.0 IRON DEFICIENCY ANEMIA DUE TO CHRONIC BLOOD LOSS: Primary | ICD-10-CM

## 2021-03-25 PROCEDURE — 96366 THER/PROPH/DIAG IV INF ADDON: CPT

## 2021-03-25 PROCEDURE — 96365 THER/PROPH/DIAG IV INF INIT: CPT

## 2021-03-25 PROCEDURE — 74011250636 HC RX REV CODE- 250/636: Performed by: INTERNAL MEDICINE

## 2021-03-25 RX ORDER — FERROUS SULFATE 220 (44)/5
SOLUTION, ORAL ORAL DAILY
COMMUNITY

## 2021-03-25 RX ORDER — EPINEPHRINE 1 MG/ML
0.3 INJECTION, SOLUTION, CONCENTRATE INTRAVENOUS AS NEEDED
Status: CANCELLED | OUTPATIENT
Start: 2021-03-31

## 2021-03-25 RX ORDER — SODIUM CHLORIDE 0.9 % (FLUSH) 0.9 %
10 SYRINGE (ML) INJECTION AS NEEDED
Status: DISPENSED | OUTPATIENT
Start: 2021-03-25 | End: 2021-03-25

## 2021-03-25 RX ORDER — SODIUM CHLORIDE 0.9 % (FLUSH) 0.9 %
10 SYRINGE (ML) INJECTION AS NEEDED
Status: CANCELLED | OUTPATIENT
Start: 2021-03-31

## 2021-03-25 RX ORDER — SODIUM CHLORIDE 9 MG/ML
10 INJECTION INTRAMUSCULAR; INTRAVENOUS; SUBCUTANEOUS AS NEEDED
Status: CANCELLED | OUTPATIENT
Start: 2021-03-31

## 2021-03-25 RX ORDER — SODIUM CHLORIDE 9 MG/ML
25 INJECTION, SOLUTION INTRAVENOUS CONTINUOUS
Status: CANCELLED | OUTPATIENT
Start: 2021-03-31

## 2021-03-25 RX ORDER — DIPHENHYDRAMINE HYDROCHLORIDE 50 MG/ML
50 INJECTION, SOLUTION INTRAMUSCULAR; INTRAVENOUS AS NEEDED
Status: CANCELLED
Start: 2021-03-31

## 2021-03-25 RX ORDER — ALBUTEROL SULFATE 0.83 MG/ML
2.5 SOLUTION RESPIRATORY (INHALATION) AS NEEDED
Status: CANCELLED
Start: 2021-03-31

## 2021-03-25 RX ORDER — ACETAMINOPHEN 325 MG/1
650 TABLET ORAL AS NEEDED
Status: CANCELLED
Start: 2021-03-31

## 2021-03-25 RX ORDER — HYDROCORTISONE SODIUM SUCCINATE 100 MG/2ML
100 INJECTION, POWDER, FOR SOLUTION INTRAMUSCULAR; INTRAVENOUS AS NEEDED
Status: CANCELLED | OUTPATIENT
Start: 2021-03-31

## 2021-03-25 RX ORDER — HEPARIN 100 UNIT/ML
300-500 SYRINGE INTRAVENOUS AS NEEDED
Status: CANCELLED
Start: 2021-03-31

## 2021-03-25 RX ORDER — SODIUM CHLORIDE 9 MG/ML
25 INJECTION, SOLUTION INTRAVENOUS CONTINUOUS
Status: DISPENSED | OUTPATIENT
Start: 2021-03-25 | End: 2021-03-25

## 2021-03-25 RX ORDER — ONDANSETRON 2 MG/ML
8 INJECTION INTRAMUSCULAR; INTRAVENOUS AS NEEDED
Status: CANCELLED | OUTPATIENT
Start: 2021-03-31

## 2021-03-25 RX ORDER — DIPHENHYDRAMINE HYDROCHLORIDE 50 MG/ML
25 INJECTION, SOLUTION INTRAMUSCULAR; INTRAVENOUS AS NEEDED
Status: CANCELLED
Start: 2021-03-31

## 2021-03-25 RX ADMIN — IRON SUCROSE 300 MG: 20 INJECTION, SOLUTION INTRAVENOUS at 09:40

## 2021-03-25 RX ADMIN — Medication 10 ML: at 09:30

## 2021-03-25 RX ADMIN — SODIUM CHLORIDE 25 ML/HR: 9 INJECTION, SOLUTION INTRAVENOUS at 09:35

## 2021-03-25 NOTE — PROGRESS NOTES
[facility] Progress Note    Date: 2021    Name: Oj Alonso    MRN: [id number]         : 1985    Ms. Ghazala Anton arrived in the [facility] today at 0900, in stable condition, here for Dose # 1 of 3, IV Venofer Infusion. She was assessed and education was provided. Both verbal & written patient education on Venofer and potential side effects and adverse reactions was provided, and Ms. Ghazala Anton verbalized understanding. Ms. Georgie Genao vitals were reviewed. Visit Vitals  /75 (BP 1 Location: Right upper arm, BP Patient Position: Sitting)   Pulse 74   Temp 99.1 °F (37.3 °C)   Resp 16   Ht 4' 11\" (1.499 m)   Wt 85.7 kg (189 lb)   SpO2 100%   Breastfeeding No   BMI 38.17 kg/m²         PIV # 25 G was established in her left AC at 0930 without incident, and brisk blood return was obtained.  ml IV Bag was initiated to infuse @ KVO PRN throughout treatment today. VENOFER (Iron Sucrose) 300 mg IV, was administered over approximately 90 minutes, per order and without incident. After completion of the IV Venofer Infusion, Ms. Ghazala Anton was observed for 30 minutes per policy, and also without incident. The NS continued @ Gloriann Or throughout the observation period. After completion of the observation period, her PIV was removed and gauze/coban was applied. Ms. Ghazala Anton tolerated treatment very well today, and voiced no complaints. Ms. Ghazala Anton was discharged from [place] [address] in stable condition at 1210. She is to return in 2 weeks, on Monday, 21 at 1100, for her next appointment, for Dose # 2 of 3, IV Venofer Infusion.      Nilesh Dobbins RN  2021  9:22 AM

## 2021-04-01 DIAGNOSIS — D50.0 IRON DEFICIENCY ANEMIA DUE TO CHRONIC BLOOD LOSS: ICD-10-CM

## 2021-04-08 ENCOUNTER — APPOINTMENT (OUTPATIENT)
Dept: INFUSION THERAPY | Age: 36
End: 2021-04-08
Payer: COMMERCIAL

## 2021-04-12 ENCOUNTER — HOSPITAL ENCOUNTER (OUTPATIENT)
Dept: INFUSION THERAPY | Age: 36
Discharge: HOME | End: 2021-04-12
Payer: COMMERCIAL

## 2021-04-12 VITALS
OXYGEN SATURATION: 100 % | RESPIRATION RATE: 16 BRPM | SYSTOLIC BLOOD PRESSURE: 109 MMHG | DIASTOLIC BLOOD PRESSURE: 69 MMHG | TEMPERATURE: 99.1 F | HEART RATE: 84 BPM

## 2021-04-12 DIAGNOSIS — D50.0 IRON DEFICIENCY ANEMIA DUE TO CHRONIC BLOOD LOSS: Primary | ICD-10-CM

## 2021-04-12 PROCEDURE — 96365 THER/PROPH/DIAG IV INF INIT: CPT

## 2021-04-12 PROCEDURE — 96366 THER/PROPH/DIAG IV INF ADDON: CPT

## 2021-04-12 PROCEDURE — 74011250636 HC RX REV CODE- 250/636: Performed by: INTERNAL MEDICINE

## 2021-04-12 RX ORDER — ACETAMINOPHEN 325 MG/1
650 TABLET ORAL AS NEEDED
Status: CANCELLED
Start: 2021-04-22

## 2021-04-12 RX ORDER — SODIUM CHLORIDE 9 MG/ML
10 INJECTION INTRAMUSCULAR; INTRAVENOUS; SUBCUTANEOUS AS NEEDED
Status: CANCELLED | OUTPATIENT
Start: 2021-04-22

## 2021-04-12 RX ORDER — ONDANSETRON 2 MG/ML
8 INJECTION INTRAMUSCULAR; INTRAVENOUS AS NEEDED
Status: CANCELLED | OUTPATIENT
Start: 2021-04-22

## 2021-04-12 RX ORDER — HEPARIN 100 UNIT/ML
300-500 SYRINGE INTRAVENOUS AS NEEDED
Status: CANCELLED
Start: 2021-04-22

## 2021-04-12 RX ORDER — HYDROCORTISONE SODIUM SUCCINATE 100 MG/2ML
100 INJECTION, POWDER, FOR SOLUTION INTRAMUSCULAR; INTRAVENOUS AS NEEDED
Status: CANCELLED | OUTPATIENT
Start: 2021-04-22

## 2021-04-12 RX ORDER — SODIUM CHLORIDE 9 MG/ML
25 INJECTION, SOLUTION INTRAVENOUS CONTINUOUS
Status: CANCELLED | OUTPATIENT
Start: 2021-04-22

## 2021-04-12 RX ORDER — SODIUM CHLORIDE 0.9 % (FLUSH) 0.9 %
10 SYRINGE (ML) INJECTION AS NEEDED
Status: DISPENSED | OUTPATIENT
Start: 2021-04-12 | End: 2021-04-12

## 2021-04-12 RX ORDER — SODIUM CHLORIDE 9 MG/ML
25 INJECTION, SOLUTION INTRAVENOUS CONTINUOUS
Status: DISPENSED | OUTPATIENT
Start: 2021-04-12 | End: 2021-04-12

## 2021-04-12 RX ORDER — SODIUM CHLORIDE 0.9 % (FLUSH) 0.9 %
10 SYRINGE (ML) INJECTION AS NEEDED
Status: CANCELLED | OUTPATIENT
Start: 2021-04-22

## 2021-04-12 RX ORDER — EPINEPHRINE 1 MG/ML
0.3 INJECTION, SOLUTION, CONCENTRATE INTRAVENOUS AS NEEDED
Status: CANCELLED | OUTPATIENT
Start: 2021-04-22

## 2021-04-12 RX ORDER — ALBUTEROL SULFATE 0.83 MG/ML
2.5 SOLUTION RESPIRATORY (INHALATION) AS NEEDED
Status: CANCELLED
Start: 2021-04-22

## 2021-04-12 RX ORDER — DIPHENHYDRAMINE HYDROCHLORIDE 50 MG/ML
50 INJECTION, SOLUTION INTRAMUSCULAR; INTRAVENOUS AS NEEDED
Status: CANCELLED
Start: 2021-04-22

## 2021-04-12 RX ORDER — DIPHENHYDRAMINE HYDROCHLORIDE 50 MG/ML
25 INJECTION, SOLUTION INTRAMUSCULAR; INTRAVENOUS AS NEEDED
Status: CANCELLED
Start: 2021-04-22

## 2021-04-12 RX ADMIN — IRON SUCROSE 300 MG: 20 INJECTION, SOLUTION INTRAVENOUS at 11:15

## 2021-04-12 RX ADMIN — Medication 10 ML: at 11:10

## 2021-04-12 RX ADMIN — Medication 20 ML: at 13:00

## 2021-04-12 NOTE — PROGRESS NOTES
[facility] Progress Note    Date: 2021    Name: Jose Farris    MRN: [id number]         : 1985    Ms. Richar Meneses arrived in the [facility] today at 1100, in stable condition, here for Dose # 2 of 3, IV Venofer Infusion. She was assessed and education was provided. Verbal patient education on Venofer and potential side effects and adverse reactions was provided, and Ms. Richar Meneses verbalized understanding. Ms. Tereza Loza vitals were reviewed. Visit Vitals  /71 (BP 1 Location: Right upper arm, BP Patient Position: Sitting)   Pulse 83   Temp 99.5 °F (37.5 °C)   Resp 16   SpO2 100%   Breastfeeding No         PIV # 25 G was established in her left AC at 1110 without incident, and brisk blood return was obtained. VENOFER (Iron Sucrose) 300 mg IV, was administered over approximately 90 minutes, per order and without incident. After completion of the IV Venofer Infusion, Ms. Richar Meneses was observed for 30 minutes per policy, and also without incident. Her PIV was flushed well per policy with NS, and was left clamped during the observation period. After completion of the observation period, her PIV was removed and gauze/coban was applied. Ms. Richar Meneses tolerated treatment very well today, and voiced no complaints. Ms. Richar Meneses was discharged from [place] [address] in stable condition at 1335. She is to return in 2 weeks, on Monday, 21 at 1100, for her next appointment, for Dose # 3 of 3, IV Venofer Infusion.      Bonnie Valladares RN  2021  11:00 AM

## 2021-04-22 DIAGNOSIS — D50.0 IRON DEFICIENCY ANEMIA DUE TO CHRONIC BLOOD LOSS: ICD-10-CM

## 2021-04-26 ENCOUNTER — HOSPITAL ENCOUNTER (OUTPATIENT)
Dept: INFUSION THERAPY | Age: 36
Discharge: HOME | End: 2021-04-26
Payer: COMMERCIAL

## 2021-04-26 VITALS
TEMPERATURE: 98.8 F | RESPIRATION RATE: 16 BRPM | SYSTOLIC BLOOD PRESSURE: 112 MMHG | HEART RATE: 84 BPM | DIASTOLIC BLOOD PRESSURE: 73 MMHG

## 2021-04-26 DIAGNOSIS — D50.0 IRON DEFICIENCY ANEMIA DUE TO CHRONIC BLOOD LOSS: Primary | ICD-10-CM

## 2021-04-26 PROCEDURE — 96366 THER/PROPH/DIAG IV INF ADDON: CPT

## 2021-04-26 PROCEDURE — 74011250636 HC RX REV CODE- 250/636: Performed by: INTERNAL MEDICINE

## 2021-04-26 PROCEDURE — 96365 THER/PROPH/DIAG IV INF INIT: CPT

## 2021-04-26 RX ORDER — ACETAMINOPHEN 325 MG/1
650 TABLET ORAL AS NEEDED
Status: CANCELLED
Start: 2021-04-26

## 2021-04-26 RX ORDER — HEPARIN 100 UNIT/ML
300-500 SYRINGE INTRAVENOUS AS NEEDED
Status: CANCELLED
Start: 2021-04-26

## 2021-04-26 RX ORDER — SODIUM CHLORIDE 9 MG/ML
25 INJECTION, SOLUTION INTRAVENOUS CONTINUOUS
Status: CANCELLED | OUTPATIENT
Start: 2021-04-26

## 2021-04-26 RX ORDER — SODIUM CHLORIDE 0.9 % (FLUSH) 0.9 %
10 SYRINGE (ML) INJECTION AS NEEDED
Status: DISPENSED | OUTPATIENT
Start: 2021-04-26 | End: 2021-04-26

## 2021-04-26 RX ORDER — DIPHENHYDRAMINE HYDROCHLORIDE 50 MG/ML
25 INJECTION, SOLUTION INTRAMUSCULAR; INTRAVENOUS AS NEEDED
Status: CANCELLED
Start: 2021-04-26

## 2021-04-26 RX ORDER — SODIUM CHLORIDE 0.9 % (FLUSH) 0.9 %
10 SYRINGE (ML) INJECTION AS NEEDED
Status: CANCELLED | OUTPATIENT
Start: 2021-04-26

## 2021-04-26 RX ORDER — ONDANSETRON 2 MG/ML
8 INJECTION INTRAMUSCULAR; INTRAVENOUS AS NEEDED
Status: CANCELLED | OUTPATIENT
Start: 2021-04-26

## 2021-04-26 RX ORDER — EPINEPHRINE 1 MG/ML
0.3 INJECTION, SOLUTION, CONCENTRATE INTRAVENOUS AS NEEDED
Status: CANCELLED | OUTPATIENT
Start: 2021-04-26

## 2021-04-26 RX ORDER — DIPHENHYDRAMINE HYDROCHLORIDE 50 MG/ML
50 INJECTION, SOLUTION INTRAMUSCULAR; INTRAVENOUS AS NEEDED
Status: CANCELLED
Start: 2021-04-26

## 2021-04-26 RX ORDER — SODIUM CHLORIDE 9 MG/ML
10 INJECTION INTRAMUSCULAR; INTRAVENOUS; SUBCUTANEOUS AS NEEDED
Status: CANCELLED | OUTPATIENT
Start: 2021-04-26

## 2021-04-26 RX ORDER — ALBUTEROL SULFATE 0.83 MG/ML
2.5 SOLUTION RESPIRATORY (INHALATION) AS NEEDED
Status: CANCELLED
Start: 2021-04-26

## 2021-04-26 RX ORDER — SODIUM CHLORIDE 9 MG/ML
25 INJECTION, SOLUTION INTRAVENOUS CONTINUOUS
Status: DISPENSED | OUTPATIENT
Start: 2021-04-26 | End: 2021-04-26

## 2021-04-26 RX ORDER — HYDROCORTISONE SODIUM SUCCINATE 100 MG/2ML
100 INJECTION, POWDER, FOR SOLUTION INTRAMUSCULAR; INTRAVENOUS AS NEEDED
Status: CANCELLED | OUTPATIENT
Start: 2021-04-26

## 2021-04-26 RX ADMIN — IRON SUCROSE 400 MG: 20 INJECTION, SOLUTION INTRAVENOUS at 11:21

## 2021-04-26 RX ADMIN — Medication 10 ML: at 14:08

## 2021-04-26 RX ADMIN — Medication 10 ML: at 11:16

## 2021-04-26 RX ADMIN — SODIUM CHLORIDE 25 ML/HR: 9 INJECTION, SOLUTION INTRAVENOUS at 11:17

## 2021-04-26 NOTE — PROGRESS NOTES
[facility] Progress Note    Date: 2021    Name: Can Warner    MRN: [id number]         : 1985    Ms. Rivera Manuel arrived in the [facility] today at 1100, in stable condition, here for Dose # 3 of 3, IV Venofer Infusion. She was assessed and education was provided. Verbal patient education on Venofer and potential side effects and adverse reactions was provided, and Ms. Rivera Manuel verbalized understanding. Ms. Anand Age vitals were reviewed. Visit Vitals  /80 (BP 1 Location: Left upper arm)   Pulse 78   Temp 98.3 °F (36.8 °C)   Resp 16       PIV # 25 G was established in her left AC at 1100. VENOFER (Iron Sucrose) 400 mg IV, was administered over approximately 150 minutes. After completion of the IV Venofer Infusion, Ms. Rivera Manuel was observed for 10 minutes. She could not stay longer because she had another appt to get to. After completion of the observation period, her PIV was removed and gauze/bandaid/tape was applied. Ms. Rivera Manuel voiced no complaints. Venofer patient information and discharge instructions discussed with Ms Rivera Manuel and she verbalized understanding. Ms. Rivera Manuel was discharged from [place] [address] in stable condition at 1410. She has no more [facility] appts.     Jennifer Miranda RN  2021

## 2021-06-30 ENCOUNTER — LAB ONLY (OUTPATIENT)
Dept: ONCOLOGY | Age: 36
End: 2021-06-30

## 2021-06-30 ENCOUNTER — HOSPITAL ENCOUNTER (OUTPATIENT)
Dept: LAB | Age: 36
Discharge: HOME | End: 2021-06-30
Payer: COMMERCIAL

## 2021-06-30 DIAGNOSIS — D50.0 IRON DEFICIENCY ANEMIA DUE TO CHRONIC BLOOD LOSS: Primary | ICD-10-CM

## 2021-06-30 LAB
ALBUMIN SERPL-MCNC: 3.9 G/DL (ref 3.4–5)
ALBUMIN/GLOB SERPL: 1.1 {RATIO} (ref 0.8–1.7)
ALP SERPL-CCNC: 78 U/L (ref 45–117)
ALT SERPL-CCNC: 29 U/L (ref 13–56)
ANION GAP SERPL CALC-SCNC: 5 MMOL/L (ref 3–18)
AST SERPL-CCNC: 30 U/L (ref 10–38)
BASOPHILS # BLD: 0 K/UL (ref 0–0.1)
BASOPHILS NFR BLD: 1 % (ref 0–2)
BILIRUB SERPL-MCNC: 0.4 MG/DL (ref 0.2–1)
BUN SERPL-MCNC: 21 MG/DL (ref 7–18)
BUN/CREAT SERPL: 24 (ref 12–20)
CALCIUM SERPL-MCNC: 9.3 MG/DL (ref 8.5–10.1)
CHLORIDE SERPL-SCNC: 111 MMOL/L (ref 100–111)
CO2 SERPL-SCNC: 27 MMOL/L (ref 21–32)
CREAT SERPL-MCNC: 0.88 MG/DL (ref 0.6–1.3)
DIFFERENTIAL METHOD BLD: ABNORMAL
EOSINOPHIL # BLD: 0.1 K/UL (ref 0–0.4)
EOSINOPHIL NFR BLD: 1 % (ref 0–5)
ERYTHROCYTE [DISTWIDTH] IN BLOOD BY AUTOMATED COUNT: 17.7 % (ref 11.6–14.5)
FERRITIN SERPL-MCNC: 6 NG/ML (ref 8–388)
GLOBULIN SER CALC-MCNC: 3.6 G/DL (ref 2–4)
GLUCOSE SERPL-MCNC: 88 MG/DL (ref 74–99)
HCT VFR BLD AUTO: 40.6 % (ref 35–45)
HGB BLD-MCNC: 11.9 G/DL (ref 12–16)
IRON SATN MFR SERPL: 7 % (ref 20–50)
IRON SERPL-MCNC: 28 UG/DL (ref 50–175)
LYMPHOCYTES # BLD: 1.6 K/UL (ref 0.9–3.6)
LYMPHOCYTES NFR BLD: 30 % (ref 21–52)
MCH RBC QN AUTO: 23.7 PG (ref 24–34)
MCHC RBC AUTO-ENTMCNC: 29.3 G/DL (ref 31–37)
MCV RBC AUTO: 80.7 FL (ref 74–97)
MONOCYTES # BLD: 0.4 K/UL (ref 0.05–1.2)
MONOCYTES NFR BLD: 8 % (ref 3–10)
NEUTS SEG # BLD: 3.3 K/UL (ref 1.8–8)
NEUTS SEG NFR BLD: 61 % (ref 40–73)
PLATELET # BLD AUTO: 219 K/UL (ref 135–420)
PMV BLD AUTO: 11.1 FL (ref 9.2–11.8)
POTASSIUM SERPL-SCNC: 4.4 MMOL/L (ref 3.5–5.5)
PROT SERPL-MCNC: 7.5 G/DL (ref 6.4–8.2)
RBC # BLD AUTO: 5.03 M/UL (ref 4.2–5.3)
SODIUM SERPL-SCNC: 143 MMOL/L (ref 136–145)
TIBC SERPL-MCNC: 392 UG/DL (ref 250–450)
WBC # BLD AUTO: 5.5 K/UL (ref 4.6–13.2)

## 2021-06-30 PROCEDURE — 80053 COMPREHEN METABOLIC PANEL: CPT

## 2021-06-30 PROCEDURE — 83540 ASSAY OF IRON: CPT

## 2021-06-30 PROCEDURE — 82728 ASSAY OF FERRITIN: CPT

## 2021-06-30 PROCEDURE — 85025 COMPLETE CBC W/AUTO DIFF WBC: CPT

## 2021-06-30 PROCEDURE — 36415 COLL VENOUS BLD VENIPUNCTURE: CPT

## 2021-07-13 ENCOUNTER — VIRTUAL VISIT (OUTPATIENT)
Dept: ONCOLOGY | Age: 36
End: 2021-07-13
Payer: COMMERCIAL

## 2021-07-13 DIAGNOSIS — D50.0 IRON DEFICIENCY ANEMIA DUE TO CHRONIC BLOOD LOSS: Primary | ICD-10-CM

## 2021-07-13 DIAGNOSIS — E55.9 VITAMIN D DEFICIENCY: ICD-10-CM

## 2021-07-13 PROCEDURE — 99213 OFFICE O/P EST LOW 20 MIN: CPT | Performed by: NURSE PRACTITIONER

## 2021-07-13 RX ORDER — ALBUTEROL SULFATE 0.83 MG/ML
2.5 SOLUTION RESPIRATORY (INHALATION) AS NEEDED
Status: CANCELLED
Start: 2021-07-13

## 2021-07-13 RX ORDER — HYDROCORTISONE SODIUM SUCCINATE 100 MG/2ML
100 INJECTION, POWDER, FOR SOLUTION INTRAMUSCULAR; INTRAVENOUS AS NEEDED
Status: CANCELLED | OUTPATIENT
Start: 2021-07-13

## 2021-07-13 RX ORDER — EPINEPHRINE 1 MG/ML
0.3 INJECTION, SOLUTION, CONCENTRATE INTRAVENOUS AS NEEDED
Status: CANCELLED | OUTPATIENT
Start: 2021-07-13

## 2021-07-13 RX ORDER — SODIUM CHLORIDE 9 MG/ML
10 INJECTION INTRAMUSCULAR; INTRAVENOUS; SUBCUTANEOUS AS NEEDED
Status: CANCELLED | OUTPATIENT
Start: 2021-07-13

## 2021-07-13 RX ORDER — ACETAMINOPHEN 325 MG/1
650 TABLET ORAL AS NEEDED
Status: CANCELLED
Start: 2021-07-13

## 2021-07-13 RX ORDER — DIPHENHYDRAMINE HYDROCHLORIDE 50 MG/ML
50 INJECTION, SOLUTION INTRAMUSCULAR; INTRAVENOUS AS NEEDED
Status: CANCELLED
Start: 2021-07-13

## 2021-07-13 RX ORDER — SODIUM CHLORIDE 0.9 % (FLUSH) 0.9 %
10 SYRINGE (ML) INJECTION AS NEEDED
Status: CANCELLED | OUTPATIENT
Start: 2021-07-13

## 2021-07-13 RX ORDER — DIPHENHYDRAMINE HYDROCHLORIDE 50 MG/ML
25 INJECTION, SOLUTION INTRAMUSCULAR; INTRAVENOUS AS NEEDED
Status: CANCELLED
Start: 2021-07-13

## 2021-07-13 RX ORDER — SODIUM CHLORIDE 9 MG/ML
25 INJECTION, SOLUTION INTRAVENOUS CONTINUOUS
Status: CANCELLED | OUTPATIENT
Start: 2021-07-13

## 2021-07-13 RX ORDER — ONDANSETRON 2 MG/ML
8 INJECTION INTRAMUSCULAR; INTRAVENOUS AS NEEDED
Status: CANCELLED | OUTPATIENT
Start: 2021-07-13

## 2021-07-13 RX ORDER — HEPARIN 100 UNIT/ML
300-500 SYRINGE INTRAVENOUS AS NEEDED
Status: CANCELLED
Start: 2021-07-13

## 2021-07-13 NOTE — PROGRESS NOTES
Desire Luna is a 38 y.o. female, evaluated via audio-only technology on 7/13/2021 for Follow-up  . Assessment & Plan:   Iron deficiency anemia:  -- She has completed iron infusion in the form of Venofor x 3 doses on 4/26/2021    -- 06/30/2021 CBC reported hemoglobin of 11.9, hematocrit of 40.6%, platelet of [phone]. Iron profile showed iron saturation of 7% and ferritin was 6.  -- She will continue to follow up with her GYN for menorrhagia. -- We will plan for IV Injectafer 750 mg x 2 doses, 1 week apart. -- We will repeat CBC, CMP, Iron profiles, ferritin in 10-12 weeks post IV Iron.     Vitamin D Deficiency:   -- She will follows up her PCP for further replacement.        -- We will see the patient back in clinic in about 3 months. Always sooner if required. The patient can have lab done prior to our next clinic visit.     12  Subjective:   Ms. Gabe Cardona is a 76-year-old woman who is referred for evaluation of anemia on 2/25/2020.  On 2/25/2020 the CBC revealed a WBC count of 5.9, hemoglobin was 9.4 g/dL, hematocrit 33.6%, and the platelet count was 294,581.  The comprehensive metabolic panel revealed normal values except for slightly elevated BUN of 20 and a BUN/creatinine ratio of 22.  A total bilirubin was slightly elevated at 1.1 iron profile showed an iron level of 18 mcg/dL with an iron saturation of only 4%.  However her ferritin was markedly decreased at only 4 ng/mL consistent with severe iron deficiency anemia. She was ordered treatment for the iron deficiency anemia with Injectafer at a dose of 750 mg given over 1 hour for 2 doses and this was completed on 4/1/2020.  Additionally as a confirmation of her severe iron deficiency anemia erythropoietin level was elevated at 127.4.  Vitamin D level was markedly diminished at 17.6 consistent with vitamin D deficiency which is classified as a vitamin D level less than 20 ng/mL. She continues to have heavy periods.  She has f/u with GYN for possible procedure. She report chronic fatigue  She denied fever, chills, night sweat, unintentional weight loss, skin lumps or bumps, acute bleeding or bruising issues at this time. Denied headache, acute vision change, dizziness, chest pain, worsen shortness of breath, palpitation, productive cough, nausea, vomiting, abdominal pain, altered bowel habits, dysuria, new bone pain or back pain, focal numbness or weakness.        Prior to Admission medications    Medication Sig Start Date End Date Taking? Authorizing Provider   ferrous sulfate 220 mg (44 mg iron)/5 mL solution Take  by mouth daily. Yes Provider, Historical   bisacodyL (DULCOLAX, BISACODYL,) 5 mg EC tablet Take 5 mg by mouth daily as needed for Constipation. Yes Provider, Historical   ibuprofen (MOTRIN) 800 mg tablet Take 800 mg by mouth every eight (8) hours as needed for Pain. Yes Provider, Historical   naproxen sodium (ALEVE) 220 mg cap Take  by mouth. Yes Provider, Historical   raNITIdine (ZANTAC) 75 mg tab Take  by mouth daily as needed. Yes Provider, Historical         Review of Systems   Constitutional: Positive for malaise/fatigue. HENT: Negative. Eyes: Negative. Respiratory: Negative. Cardiovascular: Negative. Gastrointestinal: Negative. Genitourinary: Negative. Musculoskeletal: Negative. Skin: Negative. Neurological: Negative. Endo/Heme/Allergies: Negative. Psychiatric/Behavioral: Negative. Patient-Reported Vitals 7/13/2021   Patient-Reported Weight 185lb   Patient-Reported Pulse n/a   Patient-Reported Temperature n/a   Patient-Reported SpO2 n/a   Patient-Reported Peak Flow n/a        Bianca Found, who was evaluated through a patient-initiated, synchronous (real-time) audio only encounter, and/or her healthcare decision maker, is aware that it is a billable service, with coverage as determined by her insurance carrier.  She provided verbal consent to proceed: YES-Consent obtained within past 12 months:Yes. . She has not had a related appointment within my department in the past 7 days or scheduled within the next 24 hours.       Total Time: 11-20 minutes    Osmin Belcher NP

## 2021-07-14 NOTE — PROGRESS NOTES
Norm Rtuh presents today for   Chief Complaint   Patient presents with    Follow-up       Virtual/telephone visit    Depression Screening:  3 most recent PHQ Screens 11/9/2020   Little interest or pleasure in doing things Not at all   Feeling down, depressed, irritable, or hopeless Not at all   Total Score PHQ 2 0       Learning Assessment:  Learning Assessment 2/25/2020   PRIMARY LEARNER Patient   HIGHEST LEVEL OF EDUCATION - PRIMARY LEARNER  SOME COLLEGE   BARRIERS PRIMARY LEARNER NONE   CO-LEARNER CAREGIVER No   PRIMARY LANGUAGE ENGLISH   LEARNER PREFERENCE PRIMARY READING     LISTENING   ANSWERED BY Patient   RELATIONSHIP SELF       Fall Risk  Fall Risk Assessment, last 12 mths 5/1/2020   Able to walk? Yes   Fall in past 12 months? No       ADL  No flowsheet data found. Travel Screening:    Travel Screening      No screening recorded since 07/12/21 0000      Travel History   Travel since 06/13/21     No documented travel since 06/13/21          Health Maintenance reviewed and discussed and ordered per Provider. Health Maintenance Due   Topic Date Due    Hepatitis C Screening  Never done    DTaP/Tdap/Td series (1 - Tdap) Never done    PAP AKA CERVICAL CYTOLOGY  Never done    COVID-19 Vaccine (2 - Moderna 2-dose series) 05/20/2021   . Coordination of Care:  1. Have you been to the ER, urgent care clinic since your last visit? Hospitalized since your last visit? no    2. Have you seen or consulted any other health care providers outside of the [address] [place] [address] since your last visit? Include any pap smears or colon screening.  no REASON FOR VISIT:  Chief Complaint   Patient presents with   • Office Visit     VSP   • Eye Exam   • Contact Lens   • Diabetes       HISTORY OF PRESENT ILLNESS: Vaishali Hughes is a 51 year old female who presents for a comprehensive dilated eye examination, refraction, and contact lens assessment for evaluation of diabetes and refractive error. Patient was last examined by me on July 8, 2020. She was diagnosed with diabetes at age 34 and reports her control has been adequate with a most recent hemoglobin A1c being 8.2. She denies blurring or fluctuations of her vision with high blood sugar readings. She developed \"flies\" in the vision of the right eye 4 days ago. She feels her vision is stable. She denies eye pain, irritation, redness, photophobia, burning, tearing, itching, discharge, dryness, foreign body sensation, decreased vision, diplopia, photopsia, and shadows/veils/curtains. She is happy with the vision and comfort with her contact lenses.     ASSESSMENT:  1. Myopia of both eyes with astigmatism and presbyopia    2. Encounter for fitting or adjustment of spectacles or contact lenses    3. Bilateral dry eyes    4. Posterior vitreous detachment of right eye    5. Diabetes mellitus type 2 without retinopathy (CMS/[facility])        PLAN:  Orders Placed This Encounter   Procedures   • REFRACTION   • BASIC FIT EXISTING WEARER     -New prescriptions given for eyeglasses and contact lenses as written below. Trial contact lenses dispensed. Recommend 100% UV protection when outdoors to lessen the risk of cataracts, macular degeneration, and eyelid skin cancers. Emphasized the importance of proper care and handling of contact lenses including the daily use of multipurpose solutions if not wearing daily disposables, removing the lenses before bedtime, and replacing lenses as prescribed to prevent complications. Patient was instructed to return immediately with eye pain/irritation, light sensitivity, redness, discharge, or  decreased vision.  -No evidence of diabetic retinopathy seen on dilated fundoscopic examination. In particular, no microaneurysms, no edema, no exudate, no hemorrhages, and no neovascularization were noted in either eye. I discussed the importance of good glycemic control to minimize the risk of developing diabetic retinopathy and emphasized annual dilated exams to monitor for retinopathy. She was advised to return immediately with changes in her vision.  -I explained to the patient she developed a posterior vitreous detachment, but fortunately does not have a retinal tear or detachment. I had a detailed discussion on the natural history of a posterior vitreous detachment, expectations, concerns, and risks. Signs and symptoms of a retinal detachment including a change in floaters, flashes, and peripheral vision loss were reviewed. She will call or return to my office immediately if symptoms change.    FOLLOWUP:  Return in about 1 year (around 7/14/2022) for eye exam.      FINAL Rx GLASSES:  Final Rx       Sphere Cylinder Axis Dist VA Add    Right -3.75   20/25-2 +2.00    Left -3.50 +0.50 180 20/25-2 +2.00    Expiration Date: 7/15/2022          FINAL Rx CONTACTS:  Final Contact Lens Rx       Brand Base Curve Diameter Sphere    Right Air Optix Aqua 8.6 14.2 -3.50    Left Air Optix Aqua 8.6 14.2 -2.75    Expiration Date: 7/15/2022    Replacement: Monthly          I reviewed the allergies, medication list, and past medical, family, and social history sections listed in the medical record as well as any pertinent nursing/tech notes and recent labs.

## 2021-07-20 DIAGNOSIS — D50.0 IRON DEFICIENCY ANEMIA DUE TO CHRONIC BLOOD LOSS: ICD-10-CM

## 2021-07-27 DIAGNOSIS — D50.0 IRON DEFICIENCY ANEMIA DUE TO CHRONIC BLOOD LOSS: ICD-10-CM

## 2021-07-27 RX ORDER — SODIUM CHLORIDE 9 MG/ML
25 INJECTION, SOLUTION INTRAVENOUS CONTINUOUS
Status: CANCELLED | OUTPATIENT
Start: 2021-08-03

## 2021-08-03 ENCOUNTER — HOSPITAL ENCOUNTER (OUTPATIENT)
Dept: INFUSION THERAPY | Age: 36
End: 2021-08-03

## 2021-08-10 ENCOUNTER — HOSPITAL ENCOUNTER (OUTPATIENT)
Dept: INFUSION THERAPY | Age: 36
End: 2021-08-10

## 2021-08-18 ENCOUNTER — APPOINTMENT (OUTPATIENT)
Dept: INFUSION THERAPY | Age: 36
End: 2021-08-18

## 2021-09-15 DIAGNOSIS — D50.0 IRON DEFICIENCY ANEMIA DUE TO CHRONIC BLOOD LOSS: Primary | ICD-10-CM

## 2021-09-15 RX ORDER — DIPHENHYDRAMINE HYDROCHLORIDE 50 MG/ML
50 INJECTION, SOLUTION INTRAMUSCULAR; INTRAVENOUS AS NEEDED
Status: CANCELLED
Start: 2021-10-04

## 2021-09-15 RX ORDER — EPINEPHRINE 1 MG/ML
0.3 INJECTION, SOLUTION, CONCENTRATE INTRAVENOUS AS NEEDED
Status: CANCELLED | OUTPATIENT
Start: 2021-10-04

## 2021-09-15 RX ORDER — ACETAMINOPHEN 325 MG/1
650 TABLET ORAL AS NEEDED
Status: CANCELLED
Start: 2021-10-04

## 2021-09-15 RX ORDER — ONDANSETRON 2 MG/ML
8 INJECTION INTRAMUSCULAR; INTRAVENOUS AS NEEDED
Status: CANCELLED | OUTPATIENT
Start: 2021-10-04

## 2021-09-15 RX ORDER — HYDROCORTISONE SODIUM SUCCINATE 100 MG/2ML
100 INJECTION, POWDER, FOR SOLUTION INTRAMUSCULAR; INTRAVENOUS AS NEEDED
Status: CANCELLED | OUTPATIENT
Start: 2021-10-04

## 2021-09-15 RX ORDER — SODIUM CHLORIDE 0.9 % (FLUSH) 0.9 %
10 SYRINGE (ML) INJECTION AS NEEDED
Status: CANCELLED | OUTPATIENT
Start: 2021-10-04

## 2021-09-15 RX ORDER — SODIUM CHLORIDE 9 MG/ML
10 INJECTION INTRAMUSCULAR; INTRAVENOUS; SUBCUTANEOUS AS NEEDED
Status: CANCELLED | OUTPATIENT
Start: 2021-10-04

## 2021-09-15 RX ORDER — DIPHENHYDRAMINE HYDROCHLORIDE 50 MG/ML
25 INJECTION, SOLUTION INTRAMUSCULAR; INTRAVENOUS AS NEEDED
Status: CANCELLED
Start: 2021-10-04

## 2021-09-15 RX ORDER — HEPARIN 100 UNIT/ML
300-500 SYRINGE INTRAVENOUS AS NEEDED
Status: CANCELLED
Start: 2021-10-04

## 2021-09-15 RX ORDER — SODIUM CHLORIDE 9 MG/ML
25 INJECTION, SOLUTION INTRAVENOUS CONTINUOUS
Status: CANCELLED | OUTPATIENT
Start: 2021-10-04

## 2021-09-15 RX ORDER — ALBUTEROL SULFATE 0.83 MG/ML
2.5 SOLUTION RESPIRATORY (INHALATION) AS NEEDED
Status: CANCELLED
Start: 2021-10-04

## 2021-10-04 ENCOUNTER — HOSPITAL ENCOUNTER (OUTPATIENT)
Dept: INFUSION THERAPY | Age: 36
Discharge: HOME | End: 2021-10-04
Payer: COMMERCIAL

## 2021-10-04 VITALS
HEART RATE: 75 BPM | RESPIRATION RATE: 16 BRPM | TEMPERATURE: 98.1 F | DIASTOLIC BLOOD PRESSURE: 78 MMHG | SYSTOLIC BLOOD PRESSURE: 114 MMHG

## 2021-10-04 DIAGNOSIS — D50.0 IRON DEFICIENCY ANEMIA DUE TO CHRONIC BLOOD LOSS: Primary | ICD-10-CM

## 2021-10-04 PROCEDURE — 96365 THER/PROPH/DIAG IV INF INIT: CPT

## 2021-10-04 PROCEDURE — 96366 THER/PROPH/DIAG IV INF ADDON: CPT

## 2021-10-04 PROCEDURE — 74011250636 HC RX REV CODE- 250/636: Performed by: NURSE PRACTITIONER

## 2021-10-04 RX ORDER — SODIUM CHLORIDE 9 MG/ML
25 INJECTION, SOLUTION INTRAVENOUS CONTINUOUS
Status: DISPENSED | OUTPATIENT
Start: 2021-10-04 | End: 2021-10-04

## 2021-10-04 RX ORDER — SODIUM CHLORIDE 0.9 % (FLUSH) 0.9 %
10 SYRINGE (ML) INJECTION AS NEEDED
Status: CANCELLED | OUTPATIENT
Start: 2021-10-18

## 2021-10-04 RX ORDER — SODIUM CHLORIDE 9 MG/ML
25 INJECTION, SOLUTION INTRAVENOUS CONTINUOUS
Status: CANCELLED | OUTPATIENT
Start: 2021-10-18

## 2021-10-04 RX ORDER — EPINEPHRINE 1 MG/ML
0.3 INJECTION, SOLUTION, CONCENTRATE INTRAVENOUS AS NEEDED
Status: CANCELLED | OUTPATIENT
Start: 2021-10-18

## 2021-10-04 RX ORDER — ONDANSETRON 2 MG/ML
8 INJECTION INTRAMUSCULAR; INTRAVENOUS AS NEEDED
Status: CANCELLED | OUTPATIENT
Start: 2021-10-18

## 2021-10-04 RX ORDER — ALBUTEROL SULFATE 0.83 MG/ML
2.5 SOLUTION RESPIRATORY (INHALATION) AS NEEDED
Status: CANCELLED
Start: 2021-10-18

## 2021-10-04 RX ORDER — DIPHENHYDRAMINE HYDROCHLORIDE 50 MG/ML
25 INJECTION, SOLUTION INTRAMUSCULAR; INTRAVENOUS AS NEEDED
Status: CANCELLED
Start: 2021-10-18

## 2021-10-04 RX ORDER — ACETAMINOPHEN 325 MG/1
650 TABLET ORAL AS NEEDED
Status: CANCELLED
Start: 2021-10-18

## 2021-10-04 RX ORDER — SODIUM CHLORIDE 9 MG/ML
10 INJECTION INTRAMUSCULAR; INTRAVENOUS; SUBCUTANEOUS AS NEEDED
Status: CANCELLED | OUTPATIENT
Start: 2021-10-18

## 2021-10-04 RX ORDER — HEPARIN 100 UNIT/ML
300-500 SYRINGE INTRAVENOUS AS NEEDED
Status: CANCELLED
Start: 2021-10-18

## 2021-10-04 RX ORDER — HYDROCORTISONE SODIUM SUCCINATE 100 MG/2ML
100 INJECTION, POWDER, FOR SOLUTION INTRAMUSCULAR; INTRAVENOUS AS NEEDED
Status: CANCELLED | OUTPATIENT
Start: 2021-10-18

## 2021-10-04 RX ORDER — DIPHENHYDRAMINE HYDROCHLORIDE 50 MG/ML
50 INJECTION, SOLUTION INTRAMUSCULAR; INTRAVENOUS AS NEEDED
Status: CANCELLED
Start: 2021-10-18

## 2021-10-04 RX ORDER — SODIUM CHLORIDE 0.9 % (FLUSH) 0.9 %
10-40 SYRINGE (ML) INJECTION AS NEEDED
Status: DISCONTINUED | OUTPATIENT
Start: 2021-10-04 | End: 2021-10-08 | Stop reason: HOSPADM

## 2021-10-04 RX ADMIN — Medication 10 ML: at 08:28

## 2021-10-04 RX ADMIN — Medication 10 ML: at 10:26

## 2021-10-04 RX ADMIN — IRON SUCROSE 300 MG: 20 INJECTION, SOLUTION INTRAVENOUS at 08:32

## 2021-10-04 NOTE — PROGRESS NOTES
[facility] Progress Note    Date: 2021    Name: Aris Luke    MRN: [id number]         : 1985    Ms. Bruce Hall arrived in the [address] [place] [address] today at [facility], in stable condition, here for Dose # 1 of 3, IV Venofer Infusion. She was assessed and education, including discharge instructions, was provided. Verbal patient education on Venofer and potential side effects and adverse reactions was provided, and Ms. Bruce Hall verbalized understanding. Printed copy of [employer] instructions given to her to take home. Ms. Jasen Esposito vitals were reviewed. Visit Vitals  /85 (BP 1 Location: Left upper arm, BP Patient Position: Sitting)   Pulse 80   Temp 98.5 °F (36.9 °C)   Resp 16   Breastfeeding No       PIV # 25 G was established in her left AC. VENOFER (Iron Sucrose) 300 mg IV, was administered over approximately 90 minutes. After completion of the IV Venofer Infusion, Ms. Bruce Hall was observed for 10 minutes. She could not stay longer. After completion of the observation period, her PIV was removed and gauze/bandaid/tape was applied. Ms. Bruce Hall voiced no complaints. Venofer patient information and discharge instructions discussed with Ms Bruce Hall and she verbalized understanding. Ms. Bruce Hall was discharged from [place] [address] in stable condition at 1030. [facility].     Zaid Wasserman RN  2021

## 2021-10-18 ENCOUNTER — HOSPITAL ENCOUNTER (OUTPATIENT)
Dept: INFUSION THERAPY | Age: 36
Discharge: HOME | End: 2021-10-18
Payer: COMMERCIAL

## 2021-10-18 VITALS
SYSTOLIC BLOOD PRESSURE: 111 MMHG | HEART RATE: 78 BPM | TEMPERATURE: 98.1 F | DIASTOLIC BLOOD PRESSURE: 69 MMHG | RESPIRATION RATE: 16 BRPM

## 2021-10-18 DIAGNOSIS — D50.0 IRON DEFICIENCY ANEMIA DUE TO CHRONIC BLOOD LOSS: Primary | ICD-10-CM

## 2021-10-18 PROCEDURE — 74011250636 HC RX REV CODE- 250/636: Performed by: NURSE PRACTITIONER

## 2021-10-18 PROCEDURE — 96366 THER/PROPH/DIAG IV INF ADDON: CPT

## 2021-10-18 PROCEDURE — 96365 THER/PROPH/DIAG IV INF INIT: CPT

## 2021-10-18 RX ORDER — SODIUM CHLORIDE 9 MG/ML
25 INJECTION, SOLUTION INTRAVENOUS CONTINUOUS
Status: CANCELLED | OUTPATIENT
Start: 2021-11-01

## 2021-10-18 RX ORDER — SODIUM CHLORIDE 0.9 % (FLUSH) 0.9 %
10 SYRINGE (ML) INJECTION AS NEEDED
Status: DISPENSED | OUTPATIENT
Start: 2021-10-18 | End: 2021-10-18

## 2021-10-18 RX ORDER — ONDANSETRON 2 MG/ML
8 INJECTION INTRAMUSCULAR; INTRAVENOUS AS NEEDED
Status: CANCELLED | OUTPATIENT
Start: 2021-11-01

## 2021-10-18 RX ORDER — DIPHENHYDRAMINE HYDROCHLORIDE 50 MG/ML
25 INJECTION, SOLUTION INTRAMUSCULAR; INTRAVENOUS AS NEEDED
Status: CANCELLED
Start: 2021-11-01

## 2021-10-18 RX ORDER — SODIUM CHLORIDE 9 MG/ML
10 INJECTION INTRAMUSCULAR; INTRAVENOUS; SUBCUTANEOUS AS NEEDED
Status: CANCELLED | OUTPATIENT
Start: 2021-11-01

## 2021-10-18 RX ORDER — HEPARIN 100 UNIT/ML
300-500 SYRINGE INTRAVENOUS AS NEEDED
Status: CANCELLED
Start: 2021-11-01

## 2021-10-18 RX ORDER — HYDROCORTISONE SODIUM SUCCINATE 100 MG/2ML
100 INJECTION, POWDER, FOR SOLUTION INTRAMUSCULAR; INTRAVENOUS AS NEEDED
Status: CANCELLED | OUTPATIENT
Start: 2021-11-01

## 2021-10-18 RX ORDER — EPINEPHRINE 1 MG/ML
0.3 INJECTION, SOLUTION, CONCENTRATE INTRAVENOUS AS NEEDED
Status: CANCELLED | OUTPATIENT
Start: 2021-11-01

## 2021-10-18 RX ORDER — ACETAMINOPHEN 325 MG/1
650 TABLET ORAL AS NEEDED
Status: CANCELLED
Start: 2021-11-01

## 2021-10-18 RX ORDER — ALBUTEROL SULFATE 0.83 MG/ML
2.5 SOLUTION RESPIRATORY (INHALATION) AS NEEDED
Status: CANCELLED
Start: 2021-11-01

## 2021-10-18 RX ORDER — DIPHENHYDRAMINE HYDROCHLORIDE 50 MG/ML
50 INJECTION, SOLUTION INTRAMUSCULAR; INTRAVENOUS AS NEEDED
Status: CANCELLED
Start: 2021-11-01

## 2021-10-18 RX ORDER — SODIUM CHLORIDE 0.9 % (FLUSH) 0.9 %
10 SYRINGE (ML) INJECTION AS NEEDED
Status: CANCELLED | OUTPATIENT
Start: 2021-11-01

## 2021-10-18 RX ADMIN — IRON SUCROSE 300 MG: 20 INJECTION, SOLUTION INTRAVENOUS at 09:35

## 2021-10-18 RX ADMIN — Medication 10 ML: at 11:15

## 2021-10-18 RX ADMIN — Medication 10 ML: at 09:31

## 2021-10-18 NOTE — PROGRESS NOTES
[facility] Progress Note    Date: 2021    Name: Hany Medina    MRN: [id number]         : 1985    Ms. Milagros Kam arrived in the [facility] today at [phone], in stable condition, here for Dose # 2 of 3, IV Venofer Infusion. She was assessed and education was provided. Ms. Tracey Doe vitals were reviewed. Visit Vitals  /73 (BP 1 Location: Right upper arm, BP Patient Position: Sitting)   Pulse 93   Temp 98.1 °F (36.7 °C)   Resp 16         PIV # 25 G was established in her right AC at 0931 without incident, and brisk blood return was obtained. VENOFER (Iron Sucrose) 300 mg IV, was administered over approximately 90 minutes, per order and without incident. After completion of the IV Venofer Infusion, Ms. Frazier vital signs obtained and PIV was flushed well per policy with NS and removed. Gauze/coban was applied to site. Discharge/ follow-up instructions discussed w/ pt. Pt verbalized understanding. Armband removed and shredded. Ms. Milagros Kam tolerated treatment very well today, and voiced no complaints. Ms. Milagros Kam was discharged from [place] [address] in stable condition at 1120. She is to return in 2 weeks, on 21 at 1000, for her next appointment, for Dose # 3 of 3, IV Venofer Infusion.      Denisse Richards RN  2021  11:00 AM

## 2021-11-01 ENCOUNTER — HOSPITAL ENCOUNTER (OUTPATIENT)
Dept: INFUSION THERAPY | Age: 36
Discharge: HOME | End: 2021-11-01
Payer: COMMERCIAL

## 2021-11-01 VITALS
TEMPERATURE: 98.1 F | SYSTOLIC BLOOD PRESSURE: 100 MMHG | OXYGEN SATURATION: 99 % | RESPIRATION RATE: 18 BRPM | HEART RATE: 74 BPM | DIASTOLIC BLOOD PRESSURE: 71 MMHG

## 2021-11-01 DIAGNOSIS — D50.0 IRON DEFICIENCY ANEMIA DUE TO CHRONIC BLOOD LOSS: Primary | ICD-10-CM

## 2021-11-01 PROCEDURE — 96366 THER/PROPH/DIAG IV INF ADDON: CPT

## 2021-11-01 PROCEDURE — 74011250636 HC RX REV CODE- 250/636: Performed by: NURSE PRACTITIONER

## 2021-11-01 PROCEDURE — 96365 THER/PROPH/DIAG IV INF INIT: CPT

## 2021-11-01 PROCEDURE — 96367 TX/PROPH/DG ADDL SEQ IV INF: CPT

## 2021-11-01 RX ORDER — HYDROCORTISONE SODIUM SUCCINATE 100 MG/2ML
100 INJECTION, POWDER, FOR SOLUTION INTRAMUSCULAR; INTRAVENOUS AS NEEDED
Status: CANCELLED | OUTPATIENT
Start: 2021-11-01

## 2021-11-01 RX ORDER — DIPHENHYDRAMINE HYDROCHLORIDE 50 MG/ML
50 INJECTION, SOLUTION INTRAMUSCULAR; INTRAVENOUS AS NEEDED
Status: CANCELLED
Start: 2021-11-01

## 2021-11-01 RX ORDER — SODIUM CHLORIDE 9 MG/ML
25 INJECTION, SOLUTION INTRAVENOUS CONTINUOUS
Status: CANCELLED | OUTPATIENT
Start: 2021-11-01

## 2021-11-01 RX ORDER — EPINEPHRINE 1 MG/ML
0.3 INJECTION, SOLUTION, CONCENTRATE INTRAVENOUS AS NEEDED
Status: CANCELLED | OUTPATIENT
Start: 2021-11-01

## 2021-11-01 RX ORDER — SODIUM CHLORIDE 0.9 % (FLUSH) 0.9 %
10 SYRINGE (ML) INJECTION AS NEEDED
Status: CANCELLED | OUTPATIENT
Start: 2021-11-01

## 2021-11-01 RX ORDER — SODIUM CHLORIDE 9 MG/ML
25 INJECTION, SOLUTION INTRAVENOUS CONTINUOUS
Status: DISPENSED | OUTPATIENT
Start: 2021-11-01 | End: 2021-11-01

## 2021-11-01 RX ORDER — HEPARIN 100 UNIT/ML
300-500 SYRINGE INTRAVENOUS AS NEEDED
Status: CANCELLED
Start: 2021-11-01

## 2021-11-01 RX ORDER — DIPHENHYDRAMINE HYDROCHLORIDE 50 MG/ML
25 INJECTION, SOLUTION INTRAMUSCULAR; INTRAVENOUS AS NEEDED
Status: CANCELLED
Start: 2021-11-01

## 2021-11-01 RX ORDER — ACETAMINOPHEN 325 MG/1
650 TABLET ORAL AS NEEDED
Status: CANCELLED
Start: 2021-11-01

## 2021-11-01 RX ORDER — ONDANSETRON 2 MG/ML
8 INJECTION INTRAMUSCULAR; INTRAVENOUS AS NEEDED
Status: CANCELLED | OUTPATIENT
Start: 2021-11-01

## 2021-11-01 RX ORDER — ALBUTEROL SULFATE 0.83 MG/ML
2.5 SOLUTION RESPIRATORY (INHALATION) AS NEEDED
Status: CANCELLED
Start: 2021-11-01

## 2021-11-01 RX ORDER — SODIUM CHLORIDE 0.9 % (FLUSH) 0.9 %
10 SYRINGE (ML) INJECTION AS NEEDED
Status: DISCONTINUED | OUTPATIENT
Start: 2021-11-01 | End: 2021-11-05 | Stop reason: HOSPADM

## 2021-11-01 RX ORDER — SODIUM CHLORIDE 9 MG/ML
10 INJECTION INTRAMUSCULAR; INTRAVENOUS; SUBCUTANEOUS AS NEEDED
Status: CANCELLED | OUTPATIENT
Start: 2021-11-01

## 2021-11-01 RX ADMIN — Medication 10 ML: at 10:50

## 2021-11-01 RX ADMIN — Medication 10 ML: at 14:15

## 2021-11-01 RX ADMIN — IRON SUCROSE 400 MG: 20 INJECTION, SOLUTION INTRAVENOUS at 11:02

## 2021-11-01 NOTE — PROGRESS NOTES
[facility] Progress Note    Date: 2021    Name: Mallory Corado    MRN: [id number]         : 1985    Ms. Vidhya Ray arrived in the [facility] today at 1010, in stable condition, here for Dose #3 of 3, IV Venofer Infusion. She was assessed and education was provided on medications and involvement in POC       Ms. Abdirashid Thomas vitals were reviewed. Visit Vitals  /74 (BP 1 Location: Right upper arm, BP Patient Position: Sitting)   Pulse 73   Temp 98.1 °F (36.7 °C)   Resp 16   Breastfeeding No         1st IV insertion attempted to left AC without success at 10:40  Received initial blood flow but site very positional.  2nd PIV # 25 G was established in her right AC at 1050 without incident, and brisk blood return was obtained. VENOFER (Iron Sucrose) 400 mg IV in 250cc NS , was administered over approximately 3 hours, per order and without incident. After completion of the IV Venofer Infusion, Ms. Frazier's vital signs obtained and PIV was flushed well per policy with NS and removed. Gauze/coban was applied to site. Discharge/ follow-up instructions discussed w/ pt. Pt verbalized understanding. Armband removed and shredded. Ms. Vidhya Ray tolerated treatment very well today, and voiced no complaints. Ms. Vidhya Ray was discharged from [place] [address] in stable condition at [phone], refusing observation. There are no further treatments scheduled at this time.     Maya Renteria RN  2021

## 2021-12-15 ENCOUNTER — HOSPITAL ENCOUNTER (OUTPATIENT)
Dept: LAB | Age: 36
Discharge: HOME | End: 2021-12-15
Payer: COMMERCIAL

## 2021-12-15 ENCOUNTER — LAB ONLY (OUTPATIENT)
Dept: ONCOLOGY | Age: 36
End: 2021-12-15

## 2021-12-15 DIAGNOSIS — D50.0 IRON DEFICIENCY ANEMIA DUE TO CHRONIC BLOOD LOSS: Primary | ICD-10-CM

## 2021-12-15 DIAGNOSIS — D50.0 IRON DEFICIENCY ANEMIA DUE TO CHRONIC BLOOD LOSS: ICD-10-CM

## 2021-12-15 LAB
ALBUMIN SERPL-MCNC: 3.8 G/DL (ref 3.4–5)
ALBUMIN/GLOB SERPL: 1.2 {RATIO} (ref 0.8–1.7)
ALP SERPL-CCNC: 65 U/L (ref 45–117)
ALT SERPL-CCNC: 59 U/L (ref 13–56)
ANION GAP SERPL CALC-SCNC: 4 MMOL/L (ref 3–18)
AST SERPL-CCNC: 41 U/L (ref 10–38)
BASOPHILS # BLD: 0.1 K/UL (ref 0–0.1)
BASOPHILS NFR BLD: 1 % (ref 0–2)
BILIRUB SERPL-MCNC: 0.3 MG/DL (ref 0.2–1)
BUN SERPL-MCNC: 23 MG/DL (ref 7–18)
BUN/CREAT SERPL: 26 (ref 12–20)
CALCIUM SERPL-MCNC: 9.5 MG/DL (ref 8.5–10.1)
CHLORIDE SERPL-SCNC: 107 MMOL/L (ref 100–111)
CO2 SERPL-SCNC: 27 MMOL/L (ref 21–32)
CREAT SERPL-MCNC: 0.9 MG/DL (ref 0.6–1.3)
DIFFERENTIAL METHOD BLD: ABNORMAL
EOSINOPHIL # BLD: 0.1 K/UL (ref 0–0.4)
EOSINOPHIL NFR BLD: 1 % (ref 0–5)
ERYTHROCYTE [DISTWIDTH] IN BLOOD BY AUTOMATED COUNT: 21.4 % (ref 11.6–14.5)
FERRITIN SERPL-MCNC: 20 NG/ML (ref 8–388)
GLOBULIN SER CALC-MCNC: 3.3 G/DL (ref 2–4)
GLUCOSE SERPL-MCNC: 85 MG/DL (ref 74–99)
HCT VFR BLD AUTO: 42.3 % (ref 35–45)
HGB BLD-MCNC: 12.1 G/DL (ref 12–16)
IMM GRANULOCYTES # BLD AUTO: 0 K/UL (ref 0–0.04)
IMM GRANULOCYTES NFR BLD AUTO: 0 % (ref 0–0.5)
IRON SATN MFR SERPL: 8 % (ref 20–50)
IRON SERPL-MCNC: 28 UG/DL (ref 50–175)
LYMPHOCYTES # BLD: 1.7 K/UL (ref 0.9–3.6)
LYMPHOCYTES NFR BLD: 31 % (ref 21–52)
MCH RBC QN AUTO: 24.1 PG (ref 24–34)
MCHC RBC AUTO-ENTMCNC: 28.6 G/DL (ref 31–37)
MCV RBC AUTO: 84.1 FL (ref 78–100)
MONOCYTES # BLD: 0.4 K/UL (ref 0.05–1.2)
MONOCYTES NFR BLD: 8 % (ref 3–10)
NEUTS SEG # BLD: 3.3 K/UL (ref 1.8–8)
NEUTS SEG NFR BLD: 59 % (ref 40–73)
NRBC # BLD: 0 K/UL (ref 0–0.01)
NRBC BLD-RTO: 0 PER 100 WBC
PLATELET # BLD AUTO: 256 K/UL (ref 135–420)
PLATELET COMMENTS,PCOM: ABNORMAL
PMV BLD AUTO: 10.9 FL (ref 9.2–11.8)
POTASSIUM SERPL-SCNC: 4.6 MMOL/L (ref 3.5–5.5)
PROT SERPL-MCNC: 7.1 G/DL (ref 6.4–8.2)
RBC # BLD AUTO: 5.03 M/UL (ref 4.2–5.3)
RBC MORPH BLD: ABNORMAL
RBC MORPH BLD: ABNORMAL
SODIUM SERPL-SCNC: 138 MMOL/L (ref 136–145)
TIBC SERPL-MCNC: 363 UG/DL (ref 250–450)
WBC # BLD AUTO: 5.6 K/UL (ref 4.6–13.2)

## 2021-12-15 PROCEDURE — 83540 ASSAY OF IRON: CPT

## 2021-12-15 PROCEDURE — 82728 ASSAY OF FERRITIN: CPT

## 2021-12-15 PROCEDURE — 36415 COLL VENOUS BLD VENIPUNCTURE: CPT

## 2021-12-15 PROCEDURE — 85025 COMPLETE CBC W/AUTO DIFF WBC: CPT

## 2021-12-15 PROCEDURE — 80053 COMPREHEN METABOLIC PANEL: CPT

## 2021-12-21 ENCOUNTER — VIRTUAL VISIT (OUTPATIENT)
Dept: ONCOLOGY | Age: 36
End: 2021-12-21
Payer: COMMERCIAL

## 2021-12-21 DIAGNOSIS — D50.0 IRON DEFICIENCY ANEMIA DUE TO CHRONIC BLOOD LOSS: Primary | ICD-10-CM

## 2021-12-21 PROCEDURE — 99213 OFFICE O/P EST LOW 20 MIN: CPT | Performed by: INTERNAL MEDICINE

## 2021-12-21 NOTE — PROGRESS NOTES
Sandra Valenzuela is a 39 y.o. female, evaluated via audio-only technology on 12/21/2021 for Follow-up  . Assessment & Plan:   Iron deficiency anemia:  -- She has completed iron infusion in the form of Venofor x 3 doses on 4/26/2021    -- 06/30/2021 CBC reported hemoglobin of 11.9, hematocrit of 40.6%, platelet of 388,229. Iron profile showed iron saturation of 7% and ferritin was 6.  -- 11/1/2021 s.p Venofer x 3  -- Today I have reviewed with the patient about recent lab reports. 12/15/2021 CBC reported hemoglobin improved to 13.1, hematocrit 42.3%. Iron saturation 8%, ferritin was improving to 20. Plan:  -- She will continue to follow up with her GYN for menorrhagia, considering urine embolization per patient  -- We will repeat CBC, CMP, Iron profiles, ferritin in 2 months. The patient may need another IV Iron therapy if iron persistent low.    -- We will see the patient back in clinic in about 2 months. Always sooner if required. The patient can have lab done prior to our next clinic visit.     12  Subjective:   Ms. Nora Goldberg is a 50-year-old woman who is referred for evaluation of anemia on 2/25/2020.  On 2/25/2020 the CBC revealed a WBC count of 5.9, hemoglobin was 9.4 g/dL, hematocrit 33.6%, and the platelet count was 146,691.  The comprehensive metabolic panel revealed normal values except for slightly elevated BUN of 20 and a BUN/creatinine ratio of 22.  A total bilirubin was slightly elevated at 1.1 iron profile showed an iron level of 18 mcg/dL with an iron saturation of only 4%.  However her ferritin was markedly decreased at only 4 ng/mL consistent with severe iron deficiency anemia.  She was ordered treatment for the iron deficiency anemia with Injectafer at a dose of 750 mg given over 1 hour for 2 doses and this was completed on 4/1/2020.  Additionally as a confirmation of her severe iron deficiency anemia erythropoietin level was elevated at 127.4.  Vitamin D level was markedly diminished at 17.6 consistent with vitamin D deficiency which is classified as a vitamin D level less than 20 ng/mL. Today the patient reports she continues to have heavy periods. She has f/u with GYN for possible procedure. She report chronic fatigue  She denied fever, chills, night sweat, unintentional weight loss, skin lumps or bumps, acute bleeding or bruising issues at this time. Denied headache, acute vision change, dizziness, chest pain, worsen shortness of breath, palpitation, productive cough, nausea, vomiting, abdominal pain, altered bowel habits, dysuria, new bone pain or back pain, focal numbness or weakness.        Prior to Admission medications    Medication Sig Start Date End Date Taking? Authorizing Provider   ferrous sulfate 220 mg (44 mg iron)/5 mL solution Take  by mouth daily. Provider, Historical   bisacodyL (DULCOLAX, BISACODYL,) 5 mg EC tablet Take 5 mg by mouth daily as needed for Constipation. Provider, Historical   ibuprofen (MOTRIN) 800 mg tablet Take 800 mg by mouth every eight (8) hours as needed for Pain. Provider, Historical   naproxen sodium (ALEVE) 220 mg cap Take  by mouth. Provider, Historical   raNITIdine (ZANTAC) 75 mg tab Take  by mouth daily as needed. Provider, Historical         Review of Systems   Constitutional: Positive for malaise/fatigue. Negative for chills, diaphoresis, fever and weight loss. HENT: Negative. Eyes: Negative. Respiratory: Negative. Negative for cough, hemoptysis, shortness of breath and wheezing. Cardiovascular: Negative. Negative for chest pain, palpitations and leg swelling. Gastrointestinal: Negative. Negative for abdominal pain, diarrhea, heartburn, nausea and vomiting. Genitourinary: Negative. Negative for dysuria, frequency, hematuria and urgency. Musculoskeletal: Negative. Negative for joint pain and myalgias. Skin: Negative. Negative for itching and rash. Neurological: Negative.   Negative for dizziness, seizures, weakness and headaches. Endo/Heme/Allergies: Negative. Psychiatric/Behavioral: Negative. Negative for depression. The patient does not have insomnia. Patient-Reported Vitals 7/13/2021   Patient-Reported Weight 185lb   Patient-Reported Pulse n/a   Patient-Reported Temperature n/a   Patient-Reported SpO2 n/a   Patient-Reported Peak Flow n/a        James Reyna, who was evaluated through a patient-initiated, synchronous (real-time) audio only encounter, and/or her healthcare decision maker, is aware that it is a billable service, with coverage as determined by her insurance carrier. She provided verbal consent to proceed: YES-Consent obtained within past 12 months:Yes. . She has not had a related appointment within my department in the past 7 days or scheduled within the next 24 hours.       Total Time: 11-20 minutes    Iwona Thompson MD

## 2022-02-09 ENCOUNTER — HOSPITAL ENCOUNTER (OUTPATIENT)
Dept: LAB | Age: 37
Discharge: HOME | End: 2022-02-09
Payer: COMMERCIAL

## 2022-02-09 ENCOUNTER — LAB ONLY (OUTPATIENT)
Dept: ONCOLOGY | Age: 37
End: 2022-02-09

## 2022-02-09 DIAGNOSIS — D50.0 IRON DEFICIENCY ANEMIA DUE TO CHRONIC BLOOD LOSS: ICD-10-CM

## 2022-02-09 DIAGNOSIS — D50.0 IRON DEFICIENCY ANEMIA DUE TO CHRONIC BLOOD LOSS: Primary | ICD-10-CM

## 2022-02-09 LAB
ALBUMIN SERPL-MCNC: 3.7 G/DL (ref 3.4–5)
ALBUMIN/GLOB SERPL: 1.1 {RATIO} (ref 0.8–1.7)
ALP SERPL-CCNC: 62 U/L (ref 45–117)
ALT SERPL-CCNC: 21 U/L (ref 13–56)
ANION GAP SERPL CALC-SCNC: 6 MMOL/L (ref 3–18)
AST SERPL-CCNC: 12 U/L (ref 10–38)
BASOPHILS # BLD: 0 K/UL (ref 0–0.1)
BASOPHILS NFR BLD: 1 % (ref 0–2)
BILIRUB SERPL-MCNC: 0.4 MG/DL (ref 0.2–1)
BUN SERPL-MCNC: 23 MG/DL (ref 7–18)
BUN/CREAT SERPL: 27 (ref 12–20)
CALCIUM SERPL-MCNC: 9.3 MG/DL (ref 8.5–10.1)
CHLORIDE SERPL-SCNC: 110 MMOL/L (ref 100–111)
CO2 SERPL-SCNC: 25 MMOL/L (ref 21–32)
CREAT SERPL-MCNC: 0.86 MG/DL (ref 0.6–1.3)
DIFFERENTIAL METHOD BLD: ABNORMAL
EOSINOPHIL # BLD: 0.1 K/UL (ref 0–0.4)
EOSINOPHIL NFR BLD: 1 % (ref 0–5)
ERYTHROCYTE [DISTWIDTH] IN BLOOD BY AUTOMATED COUNT: 15.9 % (ref 11.6–14.5)
FERRITIN SERPL-MCNC: 6 NG/ML (ref 8–388)
GLOBULIN SER CALC-MCNC: 3.3 G/DL (ref 2–4)
GLUCOSE SERPL-MCNC: 104 MG/DL (ref 74–99)
HCT VFR BLD AUTO: 37.4 % (ref 35–45)
HGB BLD-MCNC: 11.2 G/DL (ref 12–16)
IMM GRANULOCYTES # BLD AUTO: 0 K/UL (ref 0–0.04)
IMM GRANULOCYTES NFR BLD AUTO: 0 % (ref 0–0.5)
IRON SATN MFR SERPL: 4 % (ref 20–50)
IRON SERPL-MCNC: 16 UG/DL (ref 50–175)
LYMPHOCYTES # BLD: 1.8 K/UL (ref 0.9–3.6)
LYMPHOCYTES NFR BLD: 32 % (ref 21–52)
MCH RBC QN AUTO: 24.2 PG (ref 24–34)
MCHC RBC AUTO-ENTMCNC: 29.9 G/DL (ref 31–37)
MCV RBC AUTO: 80.8 FL (ref 78–100)
MONOCYTES # BLD: 0.6 K/UL (ref 0.05–1.2)
MONOCYTES NFR BLD: 10 % (ref 3–10)
NEUTS SEG # BLD: 3.2 K/UL (ref 1.8–8)
NEUTS SEG NFR BLD: 57 % (ref 40–73)
NRBC # BLD: 0 K/UL (ref 0–0.01)
NRBC BLD-RTO: 0 PER 100 WBC
PLATELET # BLD AUTO: 255 K/UL (ref 135–420)
PMV BLD AUTO: 10.2 FL (ref 9.2–11.8)
POTASSIUM SERPL-SCNC: 4.6 MMOL/L (ref 3.5–5.5)
PROT SERPL-MCNC: 7 G/DL (ref 6.4–8.2)
RBC # BLD AUTO: 4.63 M/UL (ref 4.2–5.3)
SODIUM SERPL-SCNC: 141 MMOL/L (ref 136–145)
TIBC SERPL-MCNC: 421 UG/DL (ref 250–450)
WBC # BLD AUTO: 5.6 K/UL (ref 4.6–13.2)

## 2022-02-09 PROCEDURE — 83540 ASSAY OF IRON: CPT

## 2022-02-09 PROCEDURE — 36415 COLL VENOUS BLD VENIPUNCTURE: CPT

## 2022-02-09 PROCEDURE — 85025 COMPLETE CBC W/AUTO DIFF WBC: CPT

## 2022-02-09 PROCEDURE — 82728 ASSAY OF FERRITIN: CPT

## 2022-02-09 PROCEDURE — 80053 COMPREHEN METABOLIC PANEL: CPT

## 2022-02-11 DIAGNOSIS — D50.0 IRON DEFICIENCY ANEMIA DUE TO CHRONIC BLOOD LOSS: Primary | ICD-10-CM

## 2022-02-11 RX ORDER — SODIUM CHLORIDE 9 MG/ML
10 INJECTION INTRAMUSCULAR; INTRAVENOUS; SUBCUTANEOUS AS NEEDED
Status: CANCELLED | OUTPATIENT
Start: 2022-02-11

## 2022-02-11 RX ORDER — EPINEPHRINE 1 MG/ML
0.3 INJECTION, SOLUTION, CONCENTRATE INTRAVENOUS AS NEEDED
Status: CANCELLED | OUTPATIENT
Start: 2022-02-11

## 2022-02-11 RX ORDER — ACETAMINOPHEN 325 MG/1
650 TABLET ORAL AS NEEDED
Status: CANCELLED
Start: 2022-02-11

## 2022-02-11 RX ORDER — SODIUM CHLORIDE 0.9 % (FLUSH) 0.9 %
10 SYRINGE (ML) INJECTION AS NEEDED
Status: CANCELLED | OUTPATIENT
Start: 2022-02-11

## 2022-02-11 RX ORDER — DIPHENHYDRAMINE HYDROCHLORIDE 50 MG/ML
25 INJECTION, SOLUTION INTRAMUSCULAR; INTRAVENOUS AS NEEDED
Status: CANCELLED
Start: 2022-02-11

## 2022-02-11 RX ORDER — ONDANSETRON 2 MG/ML
8 INJECTION INTRAMUSCULAR; INTRAVENOUS AS NEEDED
Status: CANCELLED | OUTPATIENT
Start: 2022-02-11

## 2022-02-11 RX ORDER — HEPARIN 100 UNIT/ML
300-500 SYRINGE INTRAVENOUS AS NEEDED
Status: CANCELLED
Start: 2022-02-11

## 2022-02-11 RX ORDER — SODIUM CHLORIDE 9 MG/ML
25 INJECTION, SOLUTION INTRAVENOUS CONTINUOUS
Status: CANCELLED | OUTPATIENT
Start: 2022-02-11

## 2022-02-11 RX ORDER — ALBUTEROL SULFATE 0.83 MG/ML
2.5 SOLUTION RESPIRATORY (INHALATION) AS NEEDED
Status: CANCELLED
Start: 2022-02-11

## 2022-02-11 RX ORDER — DIPHENHYDRAMINE HYDROCHLORIDE 50 MG/ML
50 INJECTION, SOLUTION INTRAMUSCULAR; INTRAVENOUS AS NEEDED
Status: CANCELLED
Start: 2022-02-11

## 2022-02-11 RX ORDER — HYDROCORTISONE SODIUM SUCCINATE 100 MG/2ML
100 INJECTION, POWDER, FOR SOLUTION INTRAMUSCULAR; INTRAVENOUS AS NEEDED
Status: CANCELLED | OUTPATIENT
Start: 2022-02-11

## 2022-02-16 DIAGNOSIS — D50.0 IRON DEFICIENCY ANEMIA DUE TO CHRONIC BLOOD LOSS: Primary | ICD-10-CM

## 2022-02-16 RX ORDER — DIPHENHYDRAMINE HYDROCHLORIDE 50 MG/ML
50 INJECTION, SOLUTION INTRAMUSCULAR; INTRAVENOUS AS NEEDED
Status: CANCELLED
Start: 2022-03-07

## 2022-02-16 RX ORDER — HEPARIN 100 UNIT/ML
300-500 SYRINGE INTRAVENOUS AS NEEDED
Status: CANCELLED
Start: 2022-03-07

## 2022-02-16 RX ORDER — SODIUM CHLORIDE 9 MG/ML
25 INJECTION, SOLUTION INTRAVENOUS CONTINUOUS
Status: CANCELLED | OUTPATIENT
Start: 2022-03-07

## 2022-02-16 RX ORDER — HYDROCORTISONE SODIUM SUCCINATE 100 MG/2ML
100 INJECTION, POWDER, FOR SOLUTION INTRAMUSCULAR; INTRAVENOUS AS NEEDED
Status: CANCELLED | OUTPATIENT
Start: 2022-03-07

## 2022-02-16 RX ORDER — DIPHENHYDRAMINE HYDROCHLORIDE 50 MG/ML
25 INJECTION, SOLUTION INTRAMUSCULAR; INTRAVENOUS AS NEEDED
Status: CANCELLED
Start: 2022-03-07

## 2022-02-16 RX ORDER — SODIUM CHLORIDE 0.9 % (FLUSH) 0.9 %
10 SYRINGE (ML) INJECTION AS NEEDED
Status: CANCELLED | OUTPATIENT
Start: 2022-03-07

## 2022-02-16 RX ORDER — ALBUTEROL SULFATE 0.83 MG/ML
2.5 SOLUTION RESPIRATORY (INHALATION) AS NEEDED
Status: CANCELLED
Start: 2022-03-07

## 2022-02-16 RX ORDER — ONDANSETRON 2 MG/ML
8 INJECTION INTRAMUSCULAR; INTRAVENOUS AS NEEDED
Status: CANCELLED | OUTPATIENT
Start: 2022-03-07

## 2022-02-16 RX ORDER — ACETAMINOPHEN 325 MG/1
650 TABLET ORAL AS NEEDED
Status: CANCELLED
Start: 2022-03-07

## 2022-02-16 RX ORDER — SODIUM CHLORIDE 9 MG/ML
10 INJECTION INTRAMUSCULAR; INTRAVENOUS; SUBCUTANEOUS AS NEEDED
Status: CANCELLED | OUTPATIENT
Start: 2022-03-07

## 2022-02-16 RX ORDER — EPINEPHRINE 1 MG/ML
0.3 INJECTION, SOLUTION, CONCENTRATE INTRAVENOUS AS NEEDED
Status: CANCELLED | OUTPATIENT
Start: 2022-03-07

## 2022-02-23 ENCOUNTER — VIRTUAL VISIT (OUTPATIENT)
Dept: ONCOLOGY | Age: 37
End: 2022-02-23
Payer: COMMERCIAL

## 2022-02-23 DIAGNOSIS — D50.0 IRON DEFICIENCY ANEMIA DUE TO CHRONIC BLOOD LOSS: Primary | ICD-10-CM

## 2022-02-23 PROCEDURE — 99214 OFFICE O/P EST MOD 30 MIN: CPT | Performed by: INTERNAL MEDICINE

## 2022-02-23 NOTE — PROGRESS NOTES
Alejandra Merida is a 39 y.o. female, evaluated via audio-only technology on 2/23/2022 for Follow-up  . Assessment & Plan:   Diagnoses and all orders for this visit:    1. Iron deficiency anemia due to chronic blood loss      The complexity of medical decision making for this visit is moderate. Iron deficiency anemia:  -- She has completed iron infusion in the form of Venofor x 3 doses on 4/26/2021    -- 06/30/2021 CBC reported hemoglobin of 11.9, hematocrit of 40.6%, platelet of 785,949. Iron profile showed iron saturation of 7% and ferritin was 6.  -- 11/1/2021 s.p Venofer x 3  -- 12/15/2021 CBC reported hemoglobin improved to 13.1, hematocrit 42.3%. Iron saturation 8%, ferritin was improving to 20.  -- She continues heavy periods but seems doing better. Today I have reviewed with the patient about recent lab reports. 2/9/2022 H/H 11.2/37.4, PLT 255K. Iron sat 4%. Ferritin 6.  -- We are planning for IV Iron therapy. Her insurance denied IV Venofer as concerned lack of therapeutic effect, instead they approved for IV Ferrlecit weekly x 8 weeks before considering Injectafer trial.    Plan:  -- IV Iron Ferrlecit weekly x 8 weeks   -- She has seen GI for EGD/Colonocpy which reported unremarkable  -- She will continue to follow up with her GYN for menorrhagia  -- Continue lab check CBC, CMP, Iron profiles, ferritin.   -- We will see the patient back in clinic in about 4 months. Always sooner if required. The patient can have lab done prior to our next clinic visit.             12  Subjective:   Ms. Sandrita Randle is a 39-year-old woman who is referred for evaluation of anemia on 2/25/2020.  On 2/25/2020 the CBC revealed a WBC count of 5.9, hemoglobin was 9.4 g/dL, hematocrit 33.6%, and the platelet count was 469,119.    Today the patient reports she continues to have heavy periods but seemly doing better.  She has f/u with GYN/ She report chronic fatigue  She denied fever, chills, night sweat, unintentional weight loss, skin lumps or bumps, acute bleeding or bruising issues at this time. Denied headache, acute vision change, dizziness, chest pain, worsen shortness of breath, palpitation, productive cough, nausea, vomiting, abdominal pain, altered bowel habits, dysuria, new bone pain or back pain, focal numbness or weakness. Prior to Admission medications    Medication Sig Start Date End Date Taking? Authorizing Provider   ferrous sulfate 220 mg (44 mg iron)/5 mL solution Take  by mouth daily. Provider, Historical   bisacodyL (DULCOLAX, BISACODYL,) 5 mg EC tablet Take 5 mg by mouth daily as needed for Constipation. Provider, Historical   ibuprofen (MOTRIN) 800 mg tablet Take 800 mg by mouth every eight (8) hours as needed for Pain. Provider, Historical   naproxen sodium (ALEVE) 220 mg cap Take  by mouth. Provider, Historical   raNITIdine (ZANTAC) 75 mg tab Take  by mouth daily as needed. Provider, Historical     Patient Active Problem List   Diagnosis Code    Iron deficiency anemia due to chronic blood loss D50.0       Review of Systems   Constitutional: Positive for malaise/fatigue. Negative for chills, diaphoresis, fever and weight loss. Respiratory: Negative for cough, hemoptysis, shortness of breath and wheezing. Cardiovascular: Negative for chest pain, palpitations and leg swelling. Gastrointestinal: Negative for abdominal pain, diarrhea, heartburn, nausea and vomiting. Genitourinary: Negative for dysuria, frequency, hematuria and urgency. Musculoskeletal: Negative for joint pain and myalgias. Skin: Negative for itching and rash. Neurological: Negative for dizziness, seizures, weakness and headaches. Psychiatric/Behavioral: Negative for depression. The patient does not have insomnia.         Patient-Reported Vitals 12/21/2021   Patient-Reported Weight 180lbs   Patient-Reported Pulse -   Patient-Reported Temperature -   Patient-Reported SpO2 -   Patient-Reported Peak Flow -         The patient was advised that our priority at this time is to keep the patients safe, and therefore we are reaching out to patients virtually to prevent them coming into the office unless necessarily. Patient verbalized understanding and was agreeable for virtual visit. Ariel Perdomo, who was evaluated through a patient-initiated, synchronous (real-time) audio only encounter, and/or her healthcare decision maker, is aware that it is a billable service, with coverage as determined by her insurance carrier. She provided verbal consent to proceed: Yes. She has not had a related appointment within my department in the past 7 days or scheduled within the next 24 hours. I have spent 25 minutes reviewing previous notes, test results and discussing the diagnosis and importance of compliance with the treatment plan as well as documenting on the day of the visit.     Blossom Ray MD

## 2022-03-02 ENCOUNTER — HOSPITAL ENCOUNTER (OUTPATIENT)
Dept: INFUSION THERAPY | Age: 37
End: 2022-03-02
Payer: COMMERCIAL

## 2022-03-07 ENCOUNTER — HOSPITAL ENCOUNTER (OUTPATIENT)
Dept: INFUSION THERAPY | Age: 37
Discharge: HOME | End: 2022-03-07
Payer: COMMERCIAL

## 2022-03-07 VITALS
HEART RATE: 84 BPM | OXYGEN SATURATION: 100 % | HEIGHT: 59 IN | RESPIRATION RATE: 18 BRPM | TEMPERATURE: 98.1 F | WEIGHT: 193 LBS | SYSTOLIC BLOOD PRESSURE: 121 MMHG | DIASTOLIC BLOOD PRESSURE: 78 MMHG | BODY MASS INDEX: 38.91 KG/M2

## 2022-03-07 DIAGNOSIS — D50.0 IRON DEFICIENCY ANEMIA DUE TO CHRONIC BLOOD LOSS: Primary | ICD-10-CM

## 2022-03-07 PROCEDURE — 74011000250 HC RX REV CODE- 250: Performed by: INTERNAL MEDICINE

## 2022-03-07 PROCEDURE — 74011250637 HC RX REV CODE- 250/637: Performed by: NURSE PRACTITIONER

## 2022-03-07 PROCEDURE — 74011000250 HC RX REV CODE- 250: Performed by: NURSE PRACTITIONER

## 2022-03-07 PROCEDURE — 74011250636 HC RX REV CODE- 250/636: Performed by: NURSE PRACTITIONER

## 2022-03-07 PROCEDURE — 99213 OFFICE O/P EST LOW 20 MIN: CPT

## 2022-03-07 PROCEDURE — 96375 TX/PRO/DX INJ NEW DRUG ADDON: CPT

## 2022-03-07 PROCEDURE — 96360 HYDRATION IV INFUSION INIT: CPT

## 2022-03-07 PROCEDURE — 74011250636 HC RX REV CODE- 250/636: Performed by: INTERNAL MEDICINE

## 2022-03-07 PROCEDURE — 74011000258 HC RX REV CODE- 258: Performed by: NURSE PRACTITIONER

## 2022-03-07 PROCEDURE — 96365 THER/PROPH/DIAG IV INF INIT: CPT

## 2022-03-07 PROCEDURE — 99205 OFFICE O/P NEW HI 60 MIN: CPT

## 2022-03-07 RX ORDER — SODIUM CHLORIDE 9 MG/ML
10 INJECTION INTRAMUSCULAR; INTRAVENOUS; SUBCUTANEOUS AS NEEDED
Status: CANCELLED | OUTPATIENT
Start: 2022-03-14

## 2022-03-07 RX ORDER — GLUCOSAMINE SULFATE 1500 MG
1000 POWDER IN PACKET (EA) ORAL DAILY
COMMUNITY

## 2022-03-07 RX ORDER — ACETAMINOPHEN 325 MG/1
650 TABLET ORAL AS NEEDED
Status: CANCELLED
Start: 2022-03-14

## 2022-03-07 RX ORDER — SODIUM CHLORIDE 9 MG/ML
25 INJECTION, SOLUTION INTRAVENOUS CONTINUOUS
Status: CANCELLED | OUTPATIENT
Start: 2022-03-14

## 2022-03-07 RX ORDER — ONDANSETRON 2 MG/ML
8 INJECTION INTRAMUSCULAR; INTRAVENOUS AS NEEDED
Status: CANCELLED | OUTPATIENT
Start: 2022-03-14

## 2022-03-07 RX ORDER — SODIUM CHLORIDE 0.9 % (FLUSH) 0.9 %
10 SYRINGE (ML) INJECTION AS NEEDED
Status: CANCELLED | OUTPATIENT
Start: 2022-03-14

## 2022-03-07 RX ORDER — CYCLOBENZAPRINE HCL 5 MG
10 TABLET ORAL
COMMUNITY

## 2022-03-07 RX ORDER — ACETAMINOPHEN 325 MG/1
650 TABLET ORAL
Status: DISCONTINUED | OUTPATIENT
Start: 2022-03-07 | End: 2022-03-08

## 2022-03-07 RX ORDER — CHROMIUM PICOLINATE 200 MCG
1 TABLET ORAL
COMMUNITY

## 2022-03-07 RX ORDER — HYDROCORTISONE SODIUM SUCCINATE 100 MG/2ML
100 INJECTION, POWDER, FOR SOLUTION INTRAMUSCULAR; INTRAVENOUS AS NEEDED
Status: CANCELLED | OUTPATIENT
Start: 2022-03-14

## 2022-03-07 RX ORDER — PREDNISONE 10 MG/1
10 TABLET ORAL
COMMUNITY
Start: 2022-03-01 | End: 2022-03-11

## 2022-03-07 RX ORDER — HEPARIN 100 UNIT/ML
300-500 SYRINGE INTRAVENOUS AS NEEDED
Status: CANCELLED
Start: 2022-03-14

## 2022-03-07 RX ORDER — SODIUM CHLORIDE 9 MG/ML
100 INJECTION, SOLUTION INTRAVENOUS CONTINUOUS
Status: DISCONTINUED | OUTPATIENT
Start: 2022-03-07 | End: 2022-03-08 | Stop reason: HOSPADM

## 2022-03-07 RX ORDER — DIPHENHYDRAMINE HYDROCHLORIDE 50 MG/ML
50 INJECTION, SOLUTION INTRAMUSCULAR; INTRAVENOUS AS NEEDED
Status: CANCELLED
Start: 2022-03-14

## 2022-03-07 RX ORDER — EPINEPHRINE 1 MG/ML
0.3 INJECTION, SOLUTION, CONCENTRATE INTRAVENOUS AS NEEDED
Status: CANCELLED | OUTPATIENT
Start: 2022-03-14

## 2022-03-07 RX ORDER — DIPHENHYDRAMINE HYDROCHLORIDE 50 MG/ML
25 INJECTION, SOLUTION INTRAMUSCULAR; INTRAVENOUS AS NEEDED
Status: CANCELLED
Start: 2022-03-14

## 2022-03-07 RX ORDER — ACETAMINOPHEN 325 MG/1
650 TABLET ORAL AS NEEDED
Status: ACTIVE | OUTPATIENT
Start: 2022-03-07 | End: 2022-03-07

## 2022-03-07 RX ORDER — ASCORBIC ACID 250 MG
250 TABLET ORAL DAILY
COMMUNITY

## 2022-03-07 RX ORDER — SODIUM CHLORIDE 0.9 % (FLUSH) 0.9 %
10-40 SYRINGE (ML) INJECTION AS NEEDED
Status: DISCONTINUED | OUTPATIENT
Start: 2022-03-07 | End: 2022-03-11 | Stop reason: HOSPADM

## 2022-03-07 RX ORDER — SODIUM CHLORIDE 9 MG/ML
25 INJECTION, SOLUTION INTRAVENOUS CONTINUOUS
Status: DISPENSED | OUTPATIENT
Start: 2022-03-07 | End: 2022-03-07

## 2022-03-07 RX ORDER — ALBUTEROL SULFATE 0.83 MG/ML
2.5 SOLUTION RESPIRATORY (INHALATION) AS NEEDED
Status: CANCELLED
Start: 2022-03-14

## 2022-03-07 RX ADMIN — ACETAMINOPHEN 650 MG: 325 TABLET ORAL at 11:35

## 2022-03-07 RX ADMIN — SODIUM CHLORIDE, PRESERVATIVE FREE 10 ML: 5 INJECTION INTRAVENOUS at 11:48

## 2022-03-07 RX ADMIN — FAMOTIDINE 20 MG: 10 INJECTION INTRAVENOUS at 11:50

## 2022-03-07 RX ADMIN — SODIUM CHLORIDE 100 ML/HR: 9 INJECTION, SOLUTION INTRAVENOUS at 12:00

## 2022-03-07 RX ADMIN — SODIUM CHLORIDE, PRESERVATIVE FREE 10 ML: 5 INJECTION INTRAVENOUS at 11:54

## 2022-03-07 RX ADMIN — SODIUM CHLORIDE, PRESERVATIVE FREE 10 ML: 5 INJECTION INTRAVENOUS at 12:40

## 2022-03-07 RX ADMIN — SODIUM CHLORIDE 125 MG: 9 INJECTION, SOLUTION INTRAVENOUS at 09:50

## 2022-03-07 RX ADMIN — SODIUM CHLORIDE 25 ML/HR: 9 INJECTION, SOLUTION INTRAVENOUS at 09:39

## 2022-03-07 NOTE — PROGRESS NOTES
[facility] Progress Note    Date: 2022    Name: Merced Cotto    MRN: [id number]         : 1985    Ms. Rosa Isela Hernández arrived at [facility] at 5114 and was assessed. States she hurt her back after exercising and a chiropractor visit. Went to patient 1st and was put on prednisone and flexeril. Patient ducation was reviewed and a handout provided provided on Ferrlecit, questions answered and patient verbalized understanding. Ms. Indu Gould vitals were reviewed. Visit Vitals  /66 (BP 1 Location: Left upper arm, BP Patient Position: Sitting)   Pulse 88   Temp 98.2 °F (36.8 °C)   Resp 18   Ht 4' 11\" (1.499 m)   Wt 87.5 kg (193 lb)   SpO2 100%   Breastfeeding No   BMI 38.98 kg/m²     #22 PIV started in [facility] [place] without difficulty, with good blood return. NS KVO initiated to run throughout today's treatment. nFerrlecit 125mg in 100ml NS infused over approximately 60 minutes without difficulty. Patient tolerated without concerns, complaints or any s&s reaction/side effects. Ms. Rosa Isela Hernández was observed for 30 minutes post infusion and remained without concerns or complaints. Vital signs remained wnl. PIV flushed and removed, covered with gauze and coban. Upon patient getting up from chair and walking down hallway, she stopped and stated she was having trouble deep breathing and was doubled over in pain. Assisted patient back to the room. Vitals immediately taken and wnl. Patient assessed and NP that was on-site was notified. Received order to administer tylenol per treatment plan for possible sensitivity. Patient in agreement, continued to monitor vital signs. Re-started PIV in left AC without difficulty and started NS @150cc/hour. Monitored vs and pain (see flowsheet). Administered pepcid per treatment plan (see MAR) and patient then received relief. No other symptoms notes. Reviewed s&s to report once patient left [facility].   NP requested tylenol and pepcid as pre-med going forward, notified pharmacy and entered order with note to pharmacy. Walked patient around nursing station to assess pain. Patient reported much improved and that it was a 1 on a 1-10 scale as compared to earlier which was an 8. PIV flushed and removed without difficulty. Patient's armband removed and shredded. Ms. Awais Moreno was discharged from [place] [address] in stable condition at [phone]. She is to return on 3/14/22 at 0900 for her next appointment.     Maxwell Omalley RN  March 7, 2022

## 2022-03-09 ENCOUNTER — APPOINTMENT (OUTPATIENT)
Dept: INFUSION THERAPY | Age: 37
End: 2022-03-09
Payer: COMMERCIAL

## 2022-03-10 RX ORDER — ACETAMINOPHEN 325 MG/1
650 TABLET ORAL ONCE
Status: CANCELLED
Start: 2022-03-10 | End: 2022-03-10

## 2022-03-14 ENCOUNTER — HOSPITAL ENCOUNTER (OUTPATIENT)
Dept: INFUSION THERAPY | Age: 37
Discharge: HOME | End: 2022-03-14
Payer: COMMERCIAL

## 2022-03-14 VITALS
OXYGEN SATURATION: 100 % | DIASTOLIC BLOOD PRESSURE: 68 MMHG | SYSTOLIC BLOOD PRESSURE: 107 MMHG | RESPIRATION RATE: 18 BRPM | TEMPERATURE: 97.2 F | HEART RATE: 76 BPM

## 2022-03-14 DIAGNOSIS — D50.0 IRON DEFICIENCY ANEMIA DUE TO CHRONIC BLOOD LOSS: Primary | ICD-10-CM

## 2022-03-14 PROCEDURE — 74011250636 HC RX REV CODE- 250/636: Performed by: NURSE PRACTITIONER

## 2022-03-14 PROCEDURE — 96375 TX/PRO/DX INJ NEW DRUG ADDON: CPT

## 2022-03-14 PROCEDURE — 74011000258 HC RX REV CODE- 258: Performed by: NURSE PRACTITIONER

## 2022-03-14 PROCEDURE — 74011250637 HC RX REV CODE- 250/637: Performed by: NURSE PRACTITIONER

## 2022-03-14 PROCEDURE — 74011000250 HC RX REV CODE- 250: Performed by: NURSE PRACTITIONER

## 2022-03-14 PROCEDURE — 96365 THER/PROPH/DIAG IV INF INIT: CPT

## 2022-03-14 RX ORDER — ACETAMINOPHEN 325 MG/1
650 TABLET ORAL ONCE
Status: COMPLETED | OUTPATIENT
Start: 2022-03-14 | End: 2022-03-14

## 2022-03-14 RX ORDER — SODIUM CHLORIDE 0.9 % (FLUSH) 0.9 %
10 SYRINGE (ML) INJECTION AS NEEDED
Status: CANCELLED | OUTPATIENT
Start: 2022-03-21

## 2022-03-14 RX ORDER — EPINEPHRINE 1 MG/ML
0.3 INJECTION, SOLUTION, CONCENTRATE INTRAVENOUS AS NEEDED
Status: CANCELLED | OUTPATIENT
Start: 2022-03-21

## 2022-03-14 RX ORDER — ACETAMINOPHEN 325 MG/1
650 TABLET ORAL AS NEEDED
Status: CANCELLED
Start: 2022-03-21

## 2022-03-14 RX ORDER — HYDROCORTISONE SODIUM SUCCINATE 100 MG/2ML
100 INJECTION, POWDER, FOR SOLUTION INTRAMUSCULAR; INTRAVENOUS AS NEEDED
Status: CANCELLED | OUTPATIENT
Start: 2022-03-21

## 2022-03-14 RX ORDER — ONDANSETRON 2 MG/ML
8 INJECTION INTRAMUSCULAR; INTRAVENOUS AS NEEDED
Status: CANCELLED | OUTPATIENT
Start: 2022-03-21

## 2022-03-14 RX ORDER — SODIUM CHLORIDE 0.9 % (FLUSH) 0.9 %
10 SYRINGE (ML) INJECTION AS NEEDED
Status: DISPENSED | OUTPATIENT
Start: 2022-03-14 | End: 2022-03-14

## 2022-03-14 RX ORDER — SODIUM CHLORIDE 9 MG/ML
10 INJECTION INTRAMUSCULAR; INTRAVENOUS; SUBCUTANEOUS AS NEEDED
Status: CANCELLED | OUTPATIENT
Start: 2022-03-21

## 2022-03-14 RX ORDER — ALBUTEROL SULFATE 0.83 MG/ML
2.5 SOLUTION RESPIRATORY (INHALATION) AS NEEDED
Status: CANCELLED
Start: 2022-03-21

## 2022-03-14 RX ORDER — SODIUM CHLORIDE 9 MG/ML
25 INJECTION, SOLUTION INTRAVENOUS CONTINUOUS
Status: CANCELLED | OUTPATIENT
Start: 2022-03-21

## 2022-03-14 RX ORDER — DIPHENHYDRAMINE HYDROCHLORIDE 50 MG/ML
50 INJECTION, SOLUTION INTRAMUSCULAR; INTRAVENOUS AS NEEDED
Status: CANCELLED
Start: 2022-03-21

## 2022-03-14 RX ORDER — SODIUM CHLORIDE 9 MG/ML
25 INJECTION, SOLUTION INTRAVENOUS CONTINUOUS
Status: DISPENSED | OUTPATIENT
Start: 2022-03-14 | End: 2022-03-14

## 2022-03-14 RX ORDER — SODIUM CHLORIDE 9 MG/ML
10 INJECTION INTRAMUSCULAR; INTRAVENOUS; SUBCUTANEOUS AS NEEDED
Status: ACTIVE | OUTPATIENT
Start: 2022-03-14 | End: 2022-03-14

## 2022-03-14 RX ORDER — HEPARIN 100 UNIT/ML
300-500 SYRINGE INTRAVENOUS AS NEEDED
Status: CANCELLED
Start: 2022-03-21

## 2022-03-14 RX ORDER — ACETAMINOPHEN 325 MG/1
650 TABLET ORAL ONCE
Status: CANCELLED
Start: 2022-03-21 | End: 2022-03-21

## 2022-03-14 RX ORDER — DIPHENHYDRAMINE HYDROCHLORIDE 50 MG/ML
25 INJECTION, SOLUTION INTRAMUSCULAR; INTRAVENOUS AS NEEDED
Status: CANCELLED
Start: 2022-03-21

## 2022-03-14 RX ADMIN — ACETAMINOPHEN 650 MG: 325 TABLET ORAL at 10:04

## 2022-03-14 RX ADMIN — SODIUM CHLORIDE 125 MG: 9 INJECTION, SOLUTION INTRAVENOUS at 10:55

## 2022-03-14 RX ADMIN — SODIUM CHLORIDE 25 ML/HR: 9 INJECTION, SOLUTION INTRAVENOUS at 10:07

## 2022-03-14 RX ADMIN — SODIUM CHLORIDE, PRESERVATIVE FREE 10 ML: 5 INJECTION INTRAVENOUS at 10:02

## 2022-03-14 RX ADMIN — FAMOTIDINE 20 MG: 10 INJECTION INTRAVENOUS at 10:10

## 2022-03-14 NOTE — PROGRESS NOTES
[facility] Progress Note    Date: 2022    Name: Sheila Montalvo    MRN: [id number]         : 1985    ferrlecit weekly for 8 doses( 2 of 8 )      Ms. Laurie Terrell arrived at [facility] at 2528 ambulatory and was assessed. No complaints from first dose ferrlecit. No concerns voiced. Ms. Jignesh Kidd vitals were reviewed. Visit Vitals  /68 (BP 1 Location: Right lower arm, BP Patient Position: Sitting)   Pulse 76   Temp 97.2 °F (36.2 °C)   Resp 18   SpO2 100%     #24 PIV started in left FA without difficult on 1st attempt, with good blood return. NS KVO initiated to run throughout today's treatment. Pre meds tylenol PO and pepcid IV given as ordered    Ferrlecit 125mg in 100ml NS infused over approximately 60 minutes without difficulty. Patient tolerated without concerns, complaints or any s&s reaction/side effects. Ms. Laurie Terrell was observed for 30 minutes post infusion and remained without concerns or complaints. Vital signs remained wnl. PIV flushed and removed, site without bleeding or infiltration. Covered with gauze and coban. Patient Vitals for the past 8 hrs:   Temp Pulse Resp BP SpO2   22 1226 97.2 °F (36.2 °C) 76 18 107/68 100 %   22 1155 97.9 °F (36.6 °C) 76 18 112/73 100 %   22 0952 97.7 °F (36.5 °C) 81 18 126/81 100 %       Reviewed discharge instructions with patient. She verbalized understanding. PIV flushed and removed without difficulty. Patient's armband removed and shredded. Ms. Laurie Terrell was discharged from [place] [address] in stable condition at 1230. She is to return on 3/21/22 at 0900 for her next appointment.     Pepe Francois RN  2022

## 2022-03-19 PROBLEM — D50.0 IRON DEFICIENCY ANEMIA DUE TO CHRONIC BLOOD LOSS: Status: ACTIVE | Noted: 2020-03-05

## 2022-03-21 ENCOUNTER — HOSPITAL ENCOUNTER (OUTPATIENT)
Dept: INFUSION THERAPY | Age: 37
Discharge: HOME | End: 2022-03-21
Payer: COMMERCIAL

## 2022-03-21 VITALS
OXYGEN SATURATION: 97 % | SYSTOLIC BLOOD PRESSURE: 113 MMHG | TEMPERATURE: 97.9 F | HEART RATE: 88 BPM | RESPIRATION RATE: 16 BRPM | DIASTOLIC BLOOD PRESSURE: 78 MMHG

## 2022-03-21 DIAGNOSIS — D50.0 IRON DEFICIENCY ANEMIA DUE TO CHRONIC BLOOD LOSS: Primary | ICD-10-CM

## 2022-03-21 PROCEDURE — 74011250637 HC RX REV CODE- 250/637: Performed by: NURSE PRACTITIONER

## 2022-03-21 PROCEDURE — 74011250636 HC RX REV CODE- 250/636: Performed by: NURSE PRACTITIONER

## 2022-03-21 PROCEDURE — 96375 TX/PRO/DX INJ NEW DRUG ADDON: CPT

## 2022-03-21 PROCEDURE — 74011000258 HC RX REV CODE- 258: Performed by: NURSE PRACTITIONER

## 2022-03-21 PROCEDURE — 74011000250 HC RX REV CODE- 250: Performed by: NURSE PRACTITIONER

## 2022-03-21 PROCEDURE — 96365 THER/PROPH/DIAG IV INF INIT: CPT

## 2022-03-21 RX ORDER — DIPHENHYDRAMINE HYDROCHLORIDE 50 MG/ML
50 INJECTION, SOLUTION INTRAMUSCULAR; INTRAVENOUS AS NEEDED
Status: CANCELLED
Start: 2022-03-28

## 2022-03-21 RX ORDER — EPINEPHRINE 1 MG/ML
0.3 INJECTION, SOLUTION, CONCENTRATE INTRAVENOUS AS NEEDED
Status: CANCELLED | OUTPATIENT
Start: 2022-03-28

## 2022-03-21 RX ORDER — ALBUTEROL SULFATE 0.83 MG/ML
2.5 SOLUTION RESPIRATORY (INHALATION) AS NEEDED
Status: CANCELLED
Start: 2022-03-28

## 2022-03-21 RX ORDER — ACETAMINOPHEN 325 MG/1
650 TABLET ORAL ONCE
Status: COMPLETED | OUTPATIENT
Start: 2022-03-21 | End: 2022-03-21

## 2022-03-21 RX ORDER — HEPARIN 100 UNIT/ML
300-500 SYRINGE INTRAVENOUS AS NEEDED
Status: CANCELLED
Start: 2022-03-28

## 2022-03-21 RX ORDER — ACETAMINOPHEN 325 MG/1
650 TABLET ORAL AS NEEDED
Status: CANCELLED
Start: 2022-03-28

## 2022-03-21 RX ORDER — SODIUM CHLORIDE 9 MG/ML
25 INJECTION, SOLUTION INTRAVENOUS CONTINUOUS
Status: CANCELLED | OUTPATIENT
Start: 2022-03-28

## 2022-03-21 RX ORDER — HYDROCORTISONE SODIUM SUCCINATE 100 MG/2ML
100 INJECTION, POWDER, FOR SOLUTION INTRAMUSCULAR; INTRAVENOUS AS NEEDED
Status: CANCELLED | OUTPATIENT
Start: 2022-03-28

## 2022-03-21 RX ORDER — SODIUM CHLORIDE 0.9 % (FLUSH) 0.9 %
10 SYRINGE (ML) INJECTION AS NEEDED
Status: CANCELLED | OUTPATIENT
Start: 2022-03-28

## 2022-03-21 RX ORDER — ACETAMINOPHEN 325 MG/1
650 TABLET ORAL ONCE
Status: CANCELLED
Start: 2022-03-28 | End: 2022-03-28

## 2022-03-21 RX ORDER — SODIUM CHLORIDE 9 MG/ML
10 INJECTION INTRAMUSCULAR; INTRAVENOUS; SUBCUTANEOUS AS NEEDED
Status: CANCELLED | OUTPATIENT
Start: 2022-03-28

## 2022-03-21 RX ORDER — SODIUM CHLORIDE 9 MG/ML
25 INJECTION, SOLUTION INTRAVENOUS CONTINUOUS
Status: DISPENSED | OUTPATIENT
Start: 2022-03-21 | End: 2022-03-21

## 2022-03-21 RX ORDER — SODIUM CHLORIDE 0.9 % (FLUSH) 0.9 %
10 SYRINGE (ML) INJECTION AS NEEDED
Status: DISPENSED | OUTPATIENT
Start: 2022-03-21 | End: 2022-03-21

## 2022-03-21 RX ORDER — DIPHENHYDRAMINE HYDROCHLORIDE 50 MG/ML
25 INJECTION, SOLUTION INTRAMUSCULAR; INTRAVENOUS AS NEEDED
Status: CANCELLED
Start: 2022-03-28

## 2022-03-21 RX ORDER — ONDANSETRON 2 MG/ML
8 INJECTION INTRAMUSCULAR; INTRAVENOUS AS NEEDED
Status: CANCELLED | OUTPATIENT
Start: 2022-03-28

## 2022-03-21 RX ADMIN — SODIUM CHLORIDE 125 MG: 9 INJECTION, SOLUTION INTRAVENOUS at 10:20

## 2022-03-21 RX ADMIN — ACETAMINOPHEN 650 MG: 325 TABLET ORAL at 09:41

## 2022-03-21 RX ADMIN — SODIUM CHLORIDE, PRESERVATIVE FREE 10 ML: 5 INJECTION INTRAVENOUS at 11:35

## 2022-03-21 RX ADMIN — SODIUM CHLORIDE 25 ML/HR: 9 INJECTION, SOLUTION INTRAVENOUS at 09:40

## 2022-03-21 RX ADMIN — FAMOTIDINE 20 MG: 10 INJECTION INTRAVENOUS at 09:43

## 2022-03-21 NOTE — PROGRESS NOTES
[facility] Progress Note    Date: 2022    Name: Emeka Mcfadden    MRN: [id number]         : 1985    Ferrlecit weekly for 8 doses(3 of 8 )      Ms. Carmen Smith arrived at [facility] at 3047 ambulatory and was assessed. No complaints from the last dose ferrlecit. No concerns voiced. Ms. Tammy Prince vitals were reviewed. Visit Vitals  /87 (BP 1 Location: Right upper arm, BP Patient Position: Sitting)   Pulse 88   Temp 98.5 °F (36.9 °C)   Resp 18   SpO2 98%     #24 PIV started in LAC without difficult on 1st attempt, with good blood return. NS KVO initiated to run throughout today's treatment. Pre meds tylenol PO and pepcid IV given as ordered. Ferrlecit 125mg in 100ml NS infused over approximately 60 minutes without difficulty. Patient tolerated without concerns, complaints or any s&s reaction/side effects. Ms. Carmen Smith was observed for 30 minutes post infusion and remained without concerns or complaints. Vital signs remained wnl. PIV flushed and removed, site without bleeding or infiltration. Covered with gauze and coban. Patient Vitals for the past 12 hrs:   Temp Pulse Resp BP SpO2   22 1159 97.9 °F (36.6 °C) 88 16 113/78 97 %   22 1130 97.5 °F (36.4 °C) 80 16 118/80 100 %   22 0925 98.5 °F (36.9 °C) 88 18 130/87 98 %       Reviewed discharge instructions with patient. She verbalized understanding. PIV flushed and removed without difficulty. Patient's armband removed and shredded. Ms. Carmen Smith was discharged from [place] [address] in stable condition at 1200. She is to return on 3/28/22 at 0900 for her next appointment.       Arsh Mora  2022

## 2022-03-28 ENCOUNTER — HOSPITAL ENCOUNTER (OUTPATIENT)
Dept: INFUSION THERAPY | Age: 37
Discharge: HOME | End: 2022-03-28
Payer: COMMERCIAL

## 2022-03-28 VITALS
DIASTOLIC BLOOD PRESSURE: 83 MMHG | OXYGEN SATURATION: 99 % | HEART RATE: 78 BPM | SYSTOLIC BLOOD PRESSURE: 123 MMHG | TEMPERATURE: 98 F | RESPIRATION RATE: 16 BRPM

## 2022-03-28 DIAGNOSIS — D50.0 IRON DEFICIENCY ANEMIA DUE TO CHRONIC BLOOD LOSS: Primary | ICD-10-CM

## 2022-03-28 PROCEDURE — 74011000250 HC RX REV CODE- 250: Performed by: NURSE PRACTITIONER

## 2022-03-28 PROCEDURE — 74011250637 HC RX REV CODE- 250/637: Performed by: NURSE PRACTITIONER

## 2022-03-28 PROCEDURE — 74011250636 HC RX REV CODE- 250/636: Performed by: NURSE PRACTITIONER

## 2022-03-28 PROCEDURE — 96365 THER/PROPH/DIAG IV INF INIT: CPT

## 2022-03-28 PROCEDURE — 96375 TX/PRO/DX INJ NEW DRUG ADDON: CPT

## 2022-03-28 PROCEDURE — 74011000258 HC RX REV CODE- 258: Performed by: NURSE PRACTITIONER

## 2022-03-28 RX ORDER — MELOXICAM 15 MG/1
15 TABLET ORAL DAILY
COMMUNITY

## 2022-03-28 RX ORDER — SODIUM CHLORIDE 0.9 % (FLUSH) 0.9 %
10 SYRINGE (ML) INJECTION AS NEEDED
Status: CANCELLED | OUTPATIENT
Start: 2022-04-04

## 2022-03-28 RX ORDER — SODIUM CHLORIDE 9 MG/ML
10 INJECTION INTRAMUSCULAR; INTRAVENOUS; SUBCUTANEOUS AS NEEDED
Status: CANCELLED | OUTPATIENT
Start: 2022-04-04

## 2022-03-28 RX ORDER — EPINEPHRINE 1 MG/ML
0.3 INJECTION, SOLUTION, CONCENTRATE INTRAVENOUS AS NEEDED
Status: CANCELLED | OUTPATIENT
Start: 2022-04-04

## 2022-03-28 RX ORDER — HEPARIN 100 UNIT/ML
300-500 SYRINGE INTRAVENOUS AS NEEDED
Status: CANCELLED
Start: 2022-04-04

## 2022-03-28 RX ORDER — ONDANSETRON 2 MG/ML
8 INJECTION INTRAMUSCULAR; INTRAVENOUS AS NEEDED
Status: CANCELLED | OUTPATIENT
Start: 2022-04-04

## 2022-03-28 RX ORDER — SODIUM CHLORIDE 0.9 % (FLUSH) 0.9 %
10 SYRINGE (ML) INJECTION AS NEEDED
Status: DISPENSED | OUTPATIENT
Start: 2022-03-28 | End: 2022-03-28

## 2022-03-28 RX ORDER — DIPHENHYDRAMINE HYDROCHLORIDE 50 MG/ML
25 INJECTION, SOLUTION INTRAMUSCULAR; INTRAVENOUS AS NEEDED
Status: CANCELLED
Start: 2022-04-04

## 2022-03-28 RX ORDER — SODIUM CHLORIDE 9 MG/ML
25 INJECTION, SOLUTION INTRAVENOUS CONTINUOUS
Status: CANCELLED | OUTPATIENT
Start: 2022-04-04

## 2022-03-28 RX ORDER — SODIUM CHLORIDE 9 MG/ML
25 INJECTION, SOLUTION INTRAVENOUS CONTINUOUS
Status: DISPENSED | OUTPATIENT
Start: 2022-03-28 | End: 2022-03-28

## 2022-03-28 RX ORDER — HYDROCORTISONE SODIUM SUCCINATE 100 MG/2ML
100 INJECTION, POWDER, FOR SOLUTION INTRAMUSCULAR; INTRAVENOUS AS NEEDED
Status: CANCELLED | OUTPATIENT
Start: 2022-04-04

## 2022-03-28 RX ORDER — ACETAMINOPHEN 325 MG/1
650 TABLET ORAL AS NEEDED
Status: CANCELLED
Start: 2022-04-04

## 2022-03-28 RX ORDER — ACETAMINOPHEN 325 MG/1
650 TABLET ORAL ONCE
Status: COMPLETED | OUTPATIENT
Start: 2022-03-28 | End: 2022-03-28

## 2022-03-28 RX ORDER — ALBUTEROL SULFATE 0.83 MG/ML
2.5 SOLUTION RESPIRATORY (INHALATION) AS NEEDED
Status: CANCELLED
Start: 2022-04-04

## 2022-03-28 RX ORDER — DIPHENHYDRAMINE HYDROCHLORIDE 50 MG/ML
50 INJECTION, SOLUTION INTRAMUSCULAR; INTRAVENOUS AS NEEDED
Status: CANCELLED
Start: 2022-04-04

## 2022-03-28 RX ADMIN — SODIUM CHLORIDE, PRESERVATIVE FREE 10 ML: 5 INJECTION INTRAVENOUS at 11:32

## 2022-03-28 RX ADMIN — FAMOTIDINE 20 MG: 10 INJECTION INTRAVENOUS at 09:44

## 2022-03-28 RX ADMIN — ACETAMINOPHEN 650 MG: 325 TABLET ORAL at 09:41

## 2022-03-28 RX ADMIN — SODIUM CHLORIDE 125 MG: 9 INJECTION, SOLUTION INTRAVENOUS at 10:19

## 2022-03-28 RX ADMIN — SODIUM CHLORIDE, PRESERVATIVE FREE 10 ML: 5 INJECTION INTRAVENOUS at 09:41

## 2022-03-28 RX ADMIN — SODIUM CHLORIDE 25 ML/HR: 9 INJECTION, SOLUTION INTRAVENOUS at 09:41

## 2022-03-28 NOTE — PROGRESS NOTES
[facility] Progress Note    Date: 2022    Name: Wilfrido Collins    MRN: [id number]         : 1985    Ferrlecit weekly for 8 doses (4 of 8 )    Ms. Taz Sanders arrived at [facility] at [address] [place] [address] ambulatory and was assessed. No complaints from the last dose ferrlecit. No concerns voiced. Ms. Lico Corrales vitals were reviewed. Visit Vitals  BP (!) 141/87 (BP 1 Location: Right upper arm, BP Patient Position: Sitting)   Pulse 90   Temp 98.9 °F (37.2 °C)   Resp 16   SpO2 97%     #24 PIV started in RAC after one failed attempt to LAC, brisk blood return obtained and flushed well with NS. NS KVO initiated to run throughout today's treatment. Pre-meds Tylenol 650 mg PO and Pepcid 20 mg IVP given approximately 30 minutes prior to infusion. (Pt states she had a reaction to Dose 1 of Ferrlecit)    Ferrlecit 125mg in 100ml NS infused over approximately 60 minutes, followed by NS flush. Pt tolerated well without complaints. Ms. Mir Baez declined to stay for 30 minutes observation. VS remained stable. No s/s of reaction. PIV removed. Gauze/coban applied. Discharge/ follow-up instructions discussed w/ pt. Pt verbalized understanding. Patient's armband removed and shredded. Ms. Taz Sanders was discharged from [place] [address] in stable condition at 1140. She is to return on 22 at 1400 for her next appointment.     Fidel Cabot, RN  2022

## 2022-04-04 ENCOUNTER — HOSPITAL ENCOUNTER (OUTPATIENT)
Dept: INFUSION THERAPY | Age: 37
Discharge: HOME | End: 2022-04-04
Payer: COMMERCIAL

## 2022-04-04 VITALS
RESPIRATION RATE: 16 BRPM | DIASTOLIC BLOOD PRESSURE: 79 MMHG | TEMPERATURE: 97 F | HEART RATE: 76 BPM | SYSTOLIC BLOOD PRESSURE: 114 MMHG | OXYGEN SATURATION: 100 %

## 2022-04-04 DIAGNOSIS — D50.0 IRON DEFICIENCY ANEMIA DUE TO CHRONIC BLOOD LOSS: Primary | ICD-10-CM

## 2022-04-04 PROCEDURE — 74011000250 HC RX REV CODE- 250: Performed by: NURSE PRACTITIONER

## 2022-04-04 PROCEDURE — 74011250637 HC RX REV CODE- 250/637: Performed by: NURSE PRACTITIONER

## 2022-04-04 PROCEDURE — 96365 THER/PROPH/DIAG IV INF INIT: CPT

## 2022-04-04 PROCEDURE — 74011000258 HC RX REV CODE- 258: Performed by: NURSE PRACTITIONER

## 2022-04-04 PROCEDURE — 96375 TX/PRO/DX INJ NEW DRUG ADDON: CPT

## 2022-04-04 PROCEDURE — 74011250636 HC RX REV CODE- 250/636: Performed by: NURSE PRACTITIONER

## 2022-04-04 RX ORDER — ALBUTEROL SULFATE 0.83 MG/ML
2.5 SOLUTION RESPIRATORY (INHALATION) AS NEEDED
Status: CANCELLED
Start: 2022-04-11

## 2022-04-04 RX ORDER — ACETAMINOPHEN 325 MG/1
650 TABLET ORAL AS NEEDED
Status: CANCELLED
Start: 2022-04-11

## 2022-04-04 RX ORDER — SODIUM CHLORIDE 9 MG/ML
25 INJECTION, SOLUTION INTRAVENOUS CONTINUOUS
Status: DISCONTINUED | OUTPATIENT
Start: 2022-04-04 | End: 2022-04-05 | Stop reason: HOSPADM

## 2022-04-04 RX ORDER — SODIUM CHLORIDE 0.9 % (FLUSH) 0.9 %
10 SYRINGE (ML) INJECTION AS NEEDED
Status: DISCONTINUED | OUTPATIENT
Start: 2022-04-04 | End: 2022-04-05 | Stop reason: HOSPADM

## 2022-04-04 RX ORDER — EPINEPHRINE 1 MG/ML
0.3 INJECTION, SOLUTION, CONCENTRATE INTRAVENOUS AS NEEDED
Status: CANCELLED | OUTPATIENT
Start: 2022-04-11

## 2022-04-04 RX ORDER — SODIUM CHLORIDE 0.9 % (FLUSH) 0.9 %
10 SYRINGE (ML) INJECTION AS NEEDED
Status: CANCELLED | OUTPATIENT
Start: 2022-04-11

## 2022-04-04 RX ORDER — HYDROCORTISONE SODIUM SUCCINATE 100 MG/2ML
100 INJECTION, POWDER, FOR SOLUTION INTRAMUSCULAR; INTRAVENOUS AS NEEDED
Status: CANCELLED | OUTPATIENT
Start: 2022-04-11

## 2022-04-04 RX ORDER — DIPHENHYDRAMINE HYDROCHLORIDE 50 MG/ML
25 INJECTION, SOLUTION INTRAMUSCULAR; INTRAVENOUS AS NEEDED
Status: CANCELLED
Start: 2022-04-11

## 2022-04-04 RX ORDER — HEPARIN 100 UNIT/ML
300-500 SYRINGE INTRAVENOUS AS NEEDED
Status: CANCELLED
Start: 2022-04-11

## 2022-04-04 RX ORDER — DIPHENHYDRAMINE HYDROCHLORIDE 50 MG/ML
50 INJECTION, SOLUTION INTRAMUSCULAR; INTRAVENOUS AS NEEDED
Status: CANCELLED
Start: 2022-04-11

## 2022-04-04 RX ORDER — ONDANSETRON 2 MG/ML
8 INJECTION INTRAMUSCULAR; INTRAVENOUS AS NEEDED
Status: CANCELLED | OUTPATIENT
Start: 2022-04-11

## 2022-04-04 RX ORDER — SODIUM CHLORIDE 9 MG/ML
10 INJECTION INTRAMUSCULAR; INTRAVENOUS; SUBCUTANEOUS AS NEEDED
Status: CANCELLED | OUTPATIENT
Start: 2022-04-11

## 2022-04-04 RX ORDER — ACETAMINOPHEN 325 MG/1
650 TABLET ORAL ONCE
Status: COMPLETED | OUTPATIENT
Start: 2022-04-04 | End: 2022-04-04

## 2022-04-04 RX ORDER — ACETAMINOPHEN 325 MG/1
650 TABLET ORAL ONCE
Status: CANCELLED
Start: 2022-04-11 | End: 2022-04-11

## 2022-04-04 RX ORDER — SODIUM CHLORIDE 9 MG/ML
25 INJECTION, SOLUTION INTRAVENOUS CONTINUOUS
Status: CANCELLED | OUTPATIENT
Start: 2022-04-11

## 2022-04-04 RX ADMIN — ACETAMINOPHEN 650 MG: 325 TABLET ORAL at 14:29

## 2022-04-04 RX ADMIN — SODIUM CHLORIDE 25 ML/HR: 9 INJECTION, SOLUTION INTRAVENOUS at 14:26

## 2022-04-04 RX ADMIN — FAMOTIDINE 20 MG: 10 INJECTION INTRAVENOUS at 14:33

## 2022-04-04 RX ADMIN — SODIUM CHLORIDE, PRESERVATIVE FREE 10 ML: 5 INJECTION INTRAVENOUS at 14:25

## 2022-04-04 RX ADMIN — SODIUM CHLORIDE 125 MG: 9 INJECTION, SOLUTION INTRAVENOUS at 15:03

## 2022-04-04 NOTE — PROGRESS NOTES
[facility] Progress Note    Date: 2022    Name: Elijah Duffy    MRN: [id number]         : 1985    Ferrlecit weekly for 8 doses (5 of 8 )    Ms. Tierney Valadez arrived at [address] [place] [address] at [phone]. Pt was assessed and education was provided. Ms. Maryuri Huang vitals were reviewed. Visit Vitals  /88 (BP 1 Location: Right upper arm, BP Patient Position: Sitting)   Pulse 77   Temp 97.5 °F (36.4 °C)   Resp 16   SpO2 97%   Breastfeeding No     #24 PIV started in distal left forearm by [place], RN after multiple failed attempts by this writer. Brisk blood return obtained and flushed well with NS. NS KVO initiated to run throughout today's treatment. Pre-meds Tylenol 650 mg PO and Pepcid 20 mg IVP given approximately 30 minutes prior to infusion. (Pt states she had a reaction to Dose 1 of Ferrlecit)    Ferrlecit 125mg in 100ml NS infused over approximately 60 minutes, followed by NS flush. Pt tolerated well without complaints. PIV removed. Gauze/coban applied. Discharge/ follow-up instructions discussed w/ pt. Pt verbalized understanding. Patient's armband removed and shredded. Ms. Tierney Valadez was discharged from [place] [address] in stable condition at 1620. She is to return on 22 at 0900 for her next appointment.     Rashida Orta RN  2022

## 2022-04-11 ENCOUNTER — HOSPITAL ENCOUNTER (OUTPATIENT)
Dept: INFUSION THERAPY | Age: 37
Discharge: HOME | End: 2022-04-11
Payer: COMMERCIAL

## 2022-04-11 VITALS
TEMPERATURE: 97.8 F | HEART RATE: 78 BPM | RESPIRATION RATE: 18 BRPM | OXYGEN SATURATION: 99 % | DIASTOLIC BLOOD PRESSURE: 77 MMHG | SYSTOLIC BLOOD PRESSURE: 121 MMHG

## 2022-04-11 DIAGNOSIS — D50.0 IRON DEFICIENCY ANEMIA DUE TO CHRONIC BLOOD LOSS: Primary | ICD-10-CM

## 2022-04-11 PROCEDURE — 74011250636 HC RX REV CODE- 250/636: Performed by: NURSE PRACTITIONER

## 2022-04-11 PROCEDURE — 74011000250 HC RX REV CODE- 250: Performed by: NURSE PRACTITIONER

## 2022-04-11 PROCEDURE — 96375 TX/PRO/DX INJ NEW DRUG ADDON: CPT

## 2022-04-11 PROCEDURE — 74011250637 HC RX REV CODE- 250/637: Performed by: NURSE PRACTITIONER

## 2022-04-11 PROCEDURE — 96365 THER/PROPH/DIAG IV INF INIT: CPT

## 2022-04-11 PROCEDURE — 74011000258 HC RX REV CODE- 258: Performed by: NURSE PRACTITIONER

## 2022-04-11 RX ORDER — ACETAMINOPHEN 325 MG/1
650 TABLET ORAL AS NEEDED
Status: CANCELLED
Start: 2022-04-18

## 2022-04-11 RX ORDER — ACETAMINOPHEN 325 MG/1
650 TABLET ORAL ONCE
Status: CANCELLED
Start: 2022-04-18 | End: 2022-04-18

## 2022-04-11 RX ORDER — SODIUM CHLORIDE 9 MG/ML
25 INJECTION, SOLUTION INTRAVENOUS CONTINUOUS
Status: CANCELLED | OUTPATIENT
Start: 2022-04-18

## 2022-04-11 RX ORDER — HYDROCORTISONE SODIUM SUCCINATE 100 MG/2ML
100 INJECTION, POWDER, FOR SOLUTION INTRAMUSCULAR; INTRAVENOUS ONCE
Status: COMPLETED | OUTPATIENT
Start: 2022-04-11 | End: 2022-04-11

## 2022-04-11 RX ORDER — SODIUM CHLORIDE 0.9 % (FLUSH) 0.9 %
10 SYRINGE (ML) INJECTION AS NEEDED
Status: CANCELLED | OUTPATIENT
Start: 2022-04-18

## 2022-04-11 RX ORDER — HYDROCORTISONE SODIUM SUCCINATE 100 MG/2ML
100 INJECTION, POWDER, FOR SOLUTION INTRAMUSCULAR; INTRAVENOUS AS NEEDED
Status: CANCELLED | OUTPATIENT
Start: 2022-04-18

## 2022-04-11 RX ORDER — SODIUM CHLORIDE 0.9 % (FLUSH) 0.9 %
10 SYRINGE (ML) INJECTION AS NEEDED
Status: DISPENSED | OUTPATIENT
Start: 2022-04-11 | End: 2022-04-11

## 2022-04-11 RX ORDER — EPINEPHRINE 1 MG/ML
0.3 INJECTION, SOLUTION, CONCENTRATE INTRAVENOUS AS NEEDED
Status: CANCELLED | OUTPATIENT
Start: 2022-04-18

## 2022-04-11 RX ORDER — ALBUTEROL SULFATE 0.83 MG/ML
2.5 SOLUTION RESPIRATORY (INHALATION) AS NEEDED
Status: CANCELLED
Start: 2022-04-18

## 2022-04-11 RX ORDER — ONDANSETRON 2 MG/ML
8 INJECTION INTRAMUSCULAR; INTRAVENOUS AS NEEDED
Status: CANCELLED | OUTPATIENT
Start: 2022-04-18

## 2022-04-11 RX ORDER — ACETAMINOPHEN 325 MG/1
650 TABLET ORAL ONCE
Status: COMPLETED | OUTPATIENT
Start: 2022-04-11 | End: 2022-04-11

## 2022-04-11 RX ORDER — SODIUM CHLORIDE 9 MG/ML
25 INJECTION, SOLUTION INTRAVENOUS CONTINUOUS
Status: DISPENSED | OUTPATIENT
Start: 2022-04-11 | End: 2022-04-11

## 2022-04-11 RX ORDER — DIPHENHYDRAMINE HYDROCHLORIDE 50 MG/ML
25 INJECTION, SOLUTION INTRAMUSCULAR; INTRAVENOUS AS NEEDED
Status: CANCELLED
Start: 2022-04-18

## 2022-04-11 RX ORDER — DIPHENHYDRAMINE HYDROCHLORIDE 50 MG/ML
50 INJECTION, SOLUTION INTRAMUSCULAR; INTRAVENOUS AS NEEDED
Status: CANCELLED
Start: 2022-04-18

## 2022-04-11 RX ORDER — SODIUM CHLORIDE 9 MG/ML
10 INJECTION INTRAMUSCULAR; INTRAVENOUS; SUBCUTANEOUS AS NEEDED
Status: CANCELLED | OUTPATIENT
Start: 2022-04-18

## 2022-04-11 RX ORDER — HEPARIN 100 UNIT/ML
300-500 SYRINGE INTRAVENOUS AS NEEDED
Status: CANCELLED
Start: 2022-04-18

## 2022-04-11 RX ADMIN — SODIUM CHLORIDE, PRESERVATIVE FREE 10 ML: 5 INJECTION INTRAVENOUS at 11:47

## 2022-04-11 RX ADMIN — SODIUM CHLORIDE, PRESERVATIVE FREE 10 ML: 5 INJECTION INTRAVENOUS at 11:26

## 2022-04-11 RX ADMIN — FAMOTIDINE 20 MG: 10 INJECTION INTRAVENOUS at 09:40

## 2022-04-11 RX ADMIN — SODIUM CHLORIDE 25 ML/HR: 9 INJECTION, SOLUTION INTRAVENOUS at 09:26

## 2022-04-11 RX ADMIN — SODIUM CHLORIDE, PRESERVATIVE FREE 10 ML: 5 INJECTION INTRAVENOUS at 09:25

## 2022-04-11 RX ADMIN — SODIUM CHLORIDE 125 MG: 9 INJECTION, SOLUTION INTRAVENOUS at 10:10

## 2022-04-11 RX ADMIN — ACETAMINOPHEN 650 MG: 325 TABLET ORAL at 09:37

## 2022-04-11 RX ADMIN — HYDROCORTISONE SODIUM SUCCINATE 100 MG: 100 INJECTION, POWDER, FOR SOLUTION INTRAMUSCULAR; INTRAVENOUS at 11:46

## 2022-04-11 NOTE — PROGRESS NOTES
[facility] Progress Note    Date: 2022    Name: Vianey Elkins    MRN: [id number]         : 1985    Ferrlecit weekly for 8 doses (6 of 8 )    Ms. Dianne Romo arrived at [facility] at [address]. Pt was assessed and education was provided. Pt states she has been tolerating ferriecit infusions without any problems since her first infusion. States she had a mild reaction and now she gets tylenol po and pepcid iv with each infusion. Ms. Lennox Murillo vitals were reviewed. Visit Vitals  BP (!) 131/91 (BP 1 Location: Right upper arm, BP Patient Position: Sitting)   Pulse 87   Temp 97.3 °F (36.3 °C)   Resp 16   SpO2 97%     24GA PIV started to RT FA x1 attempt. Flushed well with NS with positive blood return. Carolina Greaser started to infuse throughout today's treatment per order. Pre-meds Tylenol 650 mg PO and Pepcid 20 mg IVP given approximately 30 minutes prior to infusion. (Pt states she had a reaction to Dose 1 of Ferrlecit)    Ferrlecit 125mg in 100ml NS infused over approximately 60 minutes, followed by NS flush. Pt tolerated well without complaints. PIV flushed with NS and removed. 2x2 applied with coban. 1130-Upon discharge, pt states she has a sharp pain in her upper back. After a few minutes, she states it has worsened and moved around to her chest, rate 10/10. Pt is tearful. Vitals stable. Spoke with CHANELLE Aguilar and solucortef 100mg IV ordered. Anrda Salgado came to assess pt at chairside. She states she will d/c ferrilecit infusion due to reaction. Pt advised to make lab appointment only when discharged. Pt agreeable and verbalizes understanding. 24ga PIV started to RT [facility] x1 attempt. Flushed well with NS .  NS 250ml started at 500cc/hr. 1146-Solucortef 100mg given IVP per order. Within 4 minutes, pt states the intense sharp pain has subsided but is having a dull achy feeling in her back and chest. Vitals remain stable. Will continue to monitor.  1215-pt states she is feeling better, sharp pains have subsided, only feels when she takes in a deep breath. Reports feeling achy in her chest and back. Denies any other complaints. 1230-pt still states having achiness around her chest, rates 1/10. States only feels the sharp pain when she takes in a deep breath, rate 5/10. Spoke with CHANELLE Aguilar, ok to discharge but to advise pt to take extra strength tylenol and benadryl when she gets home and if pain worsens, she should go to ED. Pt made aware of instructions and pt verbalizes understanding. Vitals remain stable. Patient Vitals for the past 12 hrs:   Temp Pulse Resp BP SpO2   04/11/22 1234 97.8 °F (36.6 °C) 78 18 121/77 99 %   04/11/22 1149 -- 77 -- 137/82 100 %   04/11/22 1132 -- 91 -- 128/86 100 %   04/11/22 1124 97.7 °F (36.5 °C) 81 16 121/82 100 %   04/11/22 0914 97.3 °F (36.3 °C) 87 16 (!) 131/91 97 %       Discharge/ follow-up instructions discussed w/ pt. Pt verbalized understanding. Patient's armband removed and shredded. Ms. Adrian Rodriguez was discharged from [place] [address] in stable condition at 1240. She has no future [facility] appointments at this time.      Samy Pleitez RN  April 11, 2022

## 2022-04-18 ENCOUNTER — HOSPITAL ENCOUNTER (OUTPATIENT)
Dept: INFUSION THERAPY | Age: 37
End: 2022-04-18

## 2022-04-25 ENCOUNTER — APPOINTMENT (OUTPATIENT)
Dept: ONCOLOGY | Age: 37
End: 2022-04-25

## 2022-04-25 ENCOUNTER — APPOINTMENT (OUTPATIENT)
Dept: INFUSION THERAPY | Age: 37
End: 2022-04-25

## 2022-04-25 ENCOUNTER — HOSPITAL ENCOUNTER (OUTPATIENT)
Dept: LAB | Age: 37
Discharge: HOME | End: 2022-04-25
Payer: COMMERCIAL

## 2022-04-25 DIAGNOSIS — D50.0 IRON DEFICIENCY ANEMIA DUE TO CHRONIC BLOOD LOSS: ICD-10-CM

## 2022-04-25 LAB
ALBUMIN SERPL-MCNC: 3.7 G/DL (ref 3.4–5)
ALBUMIN/GLOB SERPL: 1.2 {RATIO} (ref 0.8–1.7)
ALP SERPL-CCNC: 67 U/L (ref 45–117)
ALT SERPL-CCNC: 23 U/L (ref 13–56)
ANION GAP SERPL CALC-SCNC: 5 MMOL/L (ref 3–18)
AST SERPL-CCNC: 15 U/L (ref 10–38)
BASOPHILS # BLD: 0 K/UL (ref 0–0.1)
BASOPHILS NFR BLD: 1 % (ref 0–2)
BILIRUB SERPL-MCNC: 0.7 MG/DL (ref 0.2–1)
BUN SERPL-MCNC: 20 MG/DL (ref 7–18)
BUN/CREAT SERPL: 23 (ref 12–20)
CALCIUM SERPL-MCNC: 9.1 MG/DL (ref 8.5–10.1)
CHLORIDE SERPL-SCNC: 110 MMOL/L (ref 100–111)
CO2 SERPL-SCNC: 26 MMOL/L (ref 21–32)
CREAT SERPL-MCNC: 0.86 MG/DL (ref 0.6–1.3)
DIFFERENTIAL METHOD BLD: ABNORMAL
EOSINOPHIL # BLD: 0 K/UL (ref 0–0.4)
EOSINOPHIL NFR BLD: 1 % (ref 0–5)
ERYTHROCYTE [DISTWIDTH] IN BLOOD BY AUTOMATED COUNT: 20.5 % (ref 11.6–14.5)
FERRITIN SERPL-MCNC: 49 NG/ML (ref 8–388)
GLOBULIN SER CALC-MCNC: 3.1 G/DL (ref 2–4)
GLUCOSE SERPL-MCNC: 89 MG/DL (ref 74–99)
HCT VFR BLD AUTO: 45.3 % (ref 35–45)
HGB BLD-MCNC: 13.6 G/DL (ref 12–16)
IMM GRANULOCYTES # BLD AUTO: 0 K/UL (ref 0–0.04)
IMM GRANULOCYTES NFR BLD AUTO: 0 % (ref 0–0.5)
IRON SATN MFR SERPL: 29 % (ref 20–50)
IRON SERPL-MCNC: 99 UG/DL (ref 50–175)
LYMPHOCYTES # BLD: 1.1 K/UL (ref 0.9–3.6)
LYMPHOCYTES NFR BLD: 20 % (ref 21–52)
MCH RBC QN AUTO: 26.6 PG (ref 24–34)
MCHC RBC AUTO-ENTMCNC: 30 G/DL (ref 31–37)
MCV RBC AUTO: 88.5 FL (ref 78–100)
MONOCYTES # BLD: 0.5 K/UL (ref 0.05–1.2)
MONOCYTES NFR BLD: 10 % (ref 3–10)
NEUTS SEG # BLD: 3.8 K/UL (ref 1.8–8)
NEUTS SEG NFR BLD: 69 % (ref 40–73)
NRBC # BLD: 0 K/UL (ref 0–0.01)
NRBC BLD-RTO: 0 PER 100 WBC
PLATELET # BLD AUTO: 184 K/UL (ref 135–420)
PMV BLD AUTO: 12.7 FL (ref 9.2–11.8)
POTASSIUM SERPL-SCNC: 4.3 MMOL/L (ref 3.5–5.5)
PROT SERPL-MCNC: 6.8 G/DL (ref 6.4–8.2)
RBC # BLD AUTO: 5.12 M/UL (ref 4.2–5.3)
SODIUM SERPL-SCNC: 141 MMOL/L (ref 136–145)
TIBC SERPL-MCNC: 341 UG/DL (ref 250–450)
WBC # BLD AUTO: 5.5 K/UL (ref 4.6–13.2)

## 2022-04-25 PROCEDURE — 83540 ASSAY OF IRON: CPT

## 2022-04-25 PROCEDURE — 80053 COMPREHEN METABOLIC PANEL: CPT

## 2022-04-25 PROCEDURE — 85025 COMPLETE CBC W/AUTO DIFF WBC: CPT

## 2022-04-25 PROCEDURE — 82728 ASSAY OF FERRITIN: CPT

## 2022-04-25 PROCEDURE — 36415 COLL VENOUS BLD VENIPUNCTURE: CPT

## 2022-07-11 ENCOUNTER — LAB ONLY (OUTPATIENT)
Dept: ONCOLOGY | Age: 37
End: 2022-07-11

## 2022-07-11 ENCOUNTER — HOSPITAL ENCOUNTER (OUTPATIENT)
Dept: LAB | Age: 37
Discharge: HOME | End: 2022-07-11
Payer: COMMERCIAL

## 2022-07-11 DIAGNOSIS — D50.0 IRON DEFICIENCY ANEMIA DUE TO CHRONIC BLOOD LOSS: ICD-10-CM

## 2022-07-11 DIAGNOSIS — D50.0 IRON DEFICIENCY ANEMIA DUE TO CHRONIC BLOOD LOSS: Primary | ICD-10-CM

## 2022-07-11 LAB
ALBUMIN SERPL-MCNC: 3.8 G/DL (ref 3.4–5)
ALBUMIN/GLOB SERPL: 1.1 {RATIO} (ref 0.8–1.7)
ALP SERPL-CCNC: 62 U/L (ref 45–117)
ALT SERPL-CCNC: 19 U/L (ref 13–56)
ANION GAP SERPL CALC-SCNC: 5 MMOL/L (ref 3–18)
AST SERPL-CCNC: 12 U/L (ref 10–38)
BASOPHILS # BLD: 0 K/UL (ref 0–0.1)
BASOPHILS NFR BLD: 1 % (ref 0–2)
BILIRUB SERPL-MCNC: 0.7 MG/DL (ref 0.2–1)
BUN SERPL-MCNC: 15 MG/DL (ref 7–18)
BUN/CREAT SERPL: 17 (ref 12–20)
CALCIUM SERPL-MCNC: 9.5 MG/DL (ref 8.5–10.1)
CHLORIDE SERPL-SCNC: 113 MMOL/L (ref 100–111)
CO2 SERPL-SCNC: 27 MMOL/L (ref 21–32)
CREAT SERPL-MCNC: 0.86 MG/DL (ref 0.6–1.3)
DIFFERENTIAL METHOD BLD: ABNORMAL
EOSINOPHIL # BLD: 0.1 K/UL (ref 0–0.4)
EOSINOPHIL NFR BLD: 1 % (ref 0–5)
ERYTHROCYTE [DISTWIDTH] IN BLOOD BY AUTOMATED COUNT: 14.2 % (ref 11.6–14.5)
FERRITIN SERPL-MCNC: 4 NG/ML (ref 8–388)
GLOBULIN SER CALC-MCNC: 3.4 G/DL (ref 2–4)
GLUCOSE SERPL-MCNC: 103 MG/DL (ref 74–99)
HCT VFR BLD AUTO: 37.1 % (ref 35–45)
HGB BLD-MCNC: 11.1 G/DL (ref 12–16)
IMM GRANULOCYTES # BLD AUTO: 0 K/UL (ref 0–0.04)
IMM GRANULOCYTES NFR BLD AUTO: 0 % (ref 0–0.5)
IRON SATN MFR SERPL: 5 % (ref 20–50)
IRON SERPL-MCNC: 24 UG/DL (ref 50–175)
LYMPHOCYTES # BLD: 1.9 K/UL (ref 0.9–3.6)
LYMPHOCYTES NFR BLD: 45 % (ref 21–52)
MCH RBC QN AUTO: 24.9 PG (ref 24–34)
MCHC RBC AUTO-ENTMCNC: 29.9 G/DL (ref 31–37)
MCV RBC AUTO: 83.2 FL (ref 78–100)
MONOCYTES # BLD: 0.4 K/UL (ref 0.05–1.2)
MONOCYTES NFR BLD: 9 % (ref 3–10)
NEUTS SEG # BLD: 1.8 K/UL (ref 1.8–8)
NEUTS SEG NFR BLD: 44 % (ref 40–73)
NRBC # BLD: 0 K/UL (ref 0–0.01)
NRBC BLD-RTO: 0 PER 100 WBC
PLATELET # BLD AUTO: 217 K/UL (ref 135–420)
PMV BLD AUTO: 11.4 FL (ref 9.2–11.8)
POTASSIUM SERPL-SCNC: 4.3 MMOL/L (ref 3.5–5.5)
PROT SERPL-MCNC: 7.2 G/DL (ref 6.4–8.2)
RBC # BLD AUTO: 4.46 M/UL (ref 4.2–5.3)
SODIUM SERPL-SCNC: 145 MMOL/L (ref 136–145)
TIBC SERPL-MCNC: 455 UG/DL (ref 250–450)
WBC # BLD AUTO: 4.2 K/UL (ref 4.6–13.2)

## 2022-07-11 PROCEDURE — 85025 COMPLETE CBC W/AUTO DIFF WBC: CPT

## 2022-07-11 PROCEDURE — 36415 COLL VENOUS BLD VENIPUNCTURE: CPT

## 2022-07-11 PROCEDURE — 80053 COMPREHEN METABOLIC PANEL: CPT

## 2022-07-11 PROCEDURE — 83540 ASSAY OF IRON: CPT

## 2022-07-11 PROCEDURE — 82728 ASSAY OF FERRITIN: CPT

## 2022-08-05 DIAGNOSIS — D50.0 IRON DEFICIENCY ANEMIA DUE TO CHRONIC BLOOD LOSS: Primary | ICD-10-CM

## 2022-08-05 RX ORDER — SODIUM CHLORIDE 9 MG/ML
5-250 INJECTION, SOLUTION INTRAVENOUS AS NEEDED
Status: CANCELLED | OUTPATIENT
Start: 2022-08-05

## 2022-08-05 RX ORDER — ONDANSETRON 2 MG/ML
8 INJECTION INTRAMUSCULAR; INTRAVENOUS AS NEEDED
Status: CANCELLED | OUTPATIENT
Start: 2022-08-05

## 2022-08-05 RX ORDER — HEPARIN 100 UNIT/ML
500 SYRINGE INTRAVENOUS AS NEEDED
Status: CANCELLED
Start: 2022-08-05

## 2022-08-05 RX ORDER — ALBUTEROL SULFATE 0.83 MG/ML
2.5 SOLUTION RESPIRATORY (INHALATION) AS NEEDED
Status: CANCELLED
Start: 2022-08-05

## 2022-08-05 RX ORDER — DIPHENHYDRAMINE HYDROCHLORIDE 50 MG/ML
50 INJECTION, SOLUTION INTRAMUSCULAR; INTRAVENOUS AS NEEDED
Status: CANCELLED
Start: 2022-08-05

## 2022-08-05 RX ORDER — DIPHENHYDRAMINE HYDROCHLORIDE 50 MG/ML
25 INJECTION, SOLUTION INTRAMUSCULAR; INTRAVENOUS AS NEEDED
Status: CANCELLED
Start: 2022-08-05

## 2022-08-05 RX ORDER — SODIUM CHLORIDE 9 MG/ML
5-40 INJECTION INTRAMUSCULAR; INTRAVENOUS; SUBCUTANEOUS AS NEEDED
Status: CANCELLED | OUTPATIENT
Start: 2022-08-05

## 2022-08-05 RX ORDER — HYDROCORTISONE SODIUM SUCCINATE 100 MG/2ML
100 INJECTION, POWDER, FOR SOLUTION INTRAMUSCULAR; INTRAVENOUS AS NEEDED
Status: CANCELLED | OUTPATIENT
Start: 2022-08-05

## 2022-08-05 RX ORDER — ACETAMINOPHEN 325 MG/1
650 TABLET ORAL AS NEEDED
Status: CANCELLED
Start: 2022-08-05

## 2022-08-05 RX ORDER — SODIUM CHLORIDE 0.9 % (FLUSH) 0.9 %
5-40 SYRINGE (ML) INJECTION AS NEEDED
Status: CANCELLED | OUTPATIENT
Start: 2022-08-05

## 2022-08-05 RX ORDER — EPINEPHRINE 1 MG/ML
0.3 INJECTION, SOLUTION, CONCENTRATE INTRAVENOUS AS NEEDED
Status: CANCELLED | OUTPATIENT
Start: 2022-08-05

## 2022-08-09 ENCOUNTER — OFFICE VISIT (OUTPATIENT)
Dept: ONCOLOGY | Age: 37
End: 2022-08-09
Payer: COMMERCIAL

## 2022-08-09 VITALS
BODY MASS INDEX: 38.3 KG/M2 | OXYGEN SATURATION: 100 % | DIASTOLIC BLOOD PRESSURE: 85 MMHG | RESPIRATION RATE: 18 BRPM | HEART RATE: 78 BPM | SYSTOLIC BLOOD PRESSURE: 123 MMHG | WEIGHT: 190 LBS | HEIGHT: 59 IN

## 2022-08-09 DIAGNOSIS — D50.0 IRON DEFICIENCY ANEMIA DUE TO CHRONIC BLOOD LOSS: Primary | ICD-10-CM

## 2022-08-09 PROCEDURE — 99213 OFFICE O/P EST LOW 20 MIN: CPT | Performed by: INTERNAL MEDICINE

## 2022-08-09 NOTE — PROGRESS NOTES
Hematology/Oncology  Progress Note    Name: Payton Lim  Date: 2022  : 1985    Laura Gillis MD     Ms. Deja Pichardo is a 39y.o. year old female who was seen for management of her Iron deficiency anemia. Current therapy: completed injectafer on 2020   Subjective:   Ms. Deja Pichardo is a 58-year-old woman who is referred for evaluation of anemia on 2020. On 2020 the CBC revealed a WBC count of 5.9, hemoglobin was 9.4 g/dL, hematocrit 33.6%, and the platelet count was [phone],454. The comprehensive metabolic panel revealed normal values except for slightly elevated BUN of 20 and a BUN/creatinine ratio of 22. A total bilirubin was slightly elevated at 1.1 iron profile showed an iron level of 18 mcg/dL with an iron saturation of only 4%. However her ferritin was markedly decreased at only 4 ng/mL consistent with severe iron deficiency anemia. She was ordered treatment for the iron deficiency anemia with Injectafer at a dose of 750 mg given over 1 hour for 2 doses and this was completed on 2020. Additionally as a confirmation of her severe iron deficiency anemia erythropoietin level was elevated at 127.4. Vitamin D level was markedly diminished at 17.6 consistent with vitamin D deficiency which is classified as a vitamin D level less than 20 ng/mL. Today she in the office for follow. She reported chronic fatigue and low energy level. She continues to have heavy periods. She has f/u with GYN for possible procedure. She denied fever, chills, night sweat, unintentional weight loss, skin lumps or bumps, acute bleeding or bruising issues at this time. Denied headache, acute vision change, dizziness, chest pain, worsen shortness of breath, palpitation, productive cough, nausea, vomiting, abdominal pain, altered bowel habits, dysuria, new bone pain or back pain, focal numbness or weakness.          History of present illness:  Ms. Deja Pichardo is a 41-year-old -American woman who has a long history of anemia most likely iron deficiency related to chronic blood loss from heavy menstrual bleeding. The patient reports that H 15 she underwent a transfusion of packed red blood cells. She has subsequently undergone a history of D&C. She also had a prior spinal tap related to her anemia which had been causing what she feels was pressure on her brain. This was done to relieve pain. She complains of feeling tired and weak most of the time. On 2020 she had a transfusion of 2 units of packed red blood cells at a time when her hemoglobin was found to be quite low at about 6.2 g/dL. She reports having heavy menstrual bleeding each month lasting for about 5 days. Usually the first 3 to 4 days are quite heavy. She was started on birth control pills recently as a means of controlling her excessive menstrual bleeding but the total number of days increased to about 14 days of actual menstrual bleeding. Past medical history, family history, and social history: these were reviewed and remains unchanged.     Past Medical History:   Diagnosis Date    Adverse effect of anesthesia     difficulty getting pt. to sleep    Anemia     Fibroids     Functional heart murmur     GERD (gastroesophageal reflux disease)     Heavy menstrual bleeding     Iron deficiency     Ovarian cyst     Pelvic pain     Polyp of cervix      Past Surgical History:   Procedure Laterality Date    HX WISDOM TEETH EXTRACTION       Social History     Socioeconomic History    Marital status: SINGLE     Spouse name: Not on file    Number of children: Not on file    Years of education: Not on file    Highest education level: Not on file   Occupational History    Not on file   Tobacco Use    Smoking status: Former     Types: Cigarettes     Quit date: 2017     Years since quittin.3    Smokeless tobacco: Never   Vaping Use    Vaping Use: Never used   Substance and Sexual Activity    Alcohol use: Yes     Comment: rare    Drug use: No    Sexual activity: Not Currently   Other Topics Concern     Service Not Asked    Blood Transfusions Not Asked    Caffeine Concern Not Asked    Occupational Exposure Not Asked    Hobby Hazards Not Asked    Sleep Concern Not Asked    Stress Concern Not Asked    Weight Concern Not Asked    Special Diet Not Asked    Back Care Not Asked    Exercise Not Asked    Bike Helmet Not Asked    Seat Belt Not Asked    Self-Exams Not Asked   Social History Narrative    Not on file     Social Determinants of Health     Financial Resource Strain: Not on file   Food Insecurity: Not on file   Transportation Needs: Not on file   Physical Activity: Not on file   Stress: Not on file   Social Connections: Not on file   Intimate Partner Violence: Not on file   Housing Stability: Not on file     Family History   Problem Relation Age of Onset    Depression Mother     Heart Disease Father     Hypertension Father     Hypertension Sister     Hypertension Sister      Current Outpatient Medications   Medication Sig Dispense Refill    meloxicam (MOBIC) 15 mg tablet Take 15 mg by mouth daily. cyclobenzaprine (FLEXERIL) 5 mg tablet Take 10 mg by mouth three (3) times daily as needed for Muscle Spasm(s). ashwagandha root extract 300 mg cap Take 1 Tablet by mouth every Monday and Friday. ascorbic acid, vitamin C, (Vitamin C) 250 mg tablet Take 250 mg by mouth daily. cholecalciferol (Vitamin D3) 25 mcg (1,000 unit) cap Take 1,000 Units by mouth daily. ferrous sulfate 220 mg (44 mg iron)/5 mL solution Take  by mouth daily. bisacodyL (DULCOLAX, BISACODYL,) 5 mg EC tablet Take 5 mg by mouth daily as needed for Constipation. ibuprofen (MOTRIN) 800 mg tablet Take 800 mg by mouth every eight (8) hours as needed for Pain. naproxen sodium (ALEVE) 220 mg cap Take  by mouth. Review of Systems  Constitutional: The patient has no acute distress or discomfort.   HEENT: The patient denies recent head trauma, eye pain, blurred vision,  hearing deficit, oropharyngeal mucosal pain or lesions, and the patient denies throat pain or discomfort. Lymphatics: The patient denies palpable peripheral lymphadenopathy. Hematologic: The patient denies having bruising, bleeding, or progressive fatigue. Respiratory: Patient denies having shortness of breath, cough, sputum production, fever, or dyspnea on exertion. Cardiovascular: The patient denies having leg pain, leg swelling, heart palpitations, chest permit, chest pain, or lightheadedness. The patient denies having dyspnea on exertion. Gastrointestinal: The patient denies having nausea, emesis, or diarrhea. The patient denies having any hematemesis or blood in the stool. Genitourinary: Patient denies having urinary urgency, frequency, or dysuria. The patient denies having blood in the urine. Psychological: The patient denies having symptoms of nervousness, anxiety, depression, or thoughts of harming himself some of this. Skin: Patient denies having skin rashes, skin, ulcerations, or unexplained itching or pruritus. Musculoskeletal: The patient denies having pain in the joints or bones. Objective:     Visit Vitals  /85   Pulse 78   Resp 18   Ht 4' 11\" (1.499 m)   Wt 86.2 kg (190 lb)   SpO2 100%   BMI 38.38 kg/m²       ECOG PS0    Physical Exam:   Gen. Appearance: The patient is in no acute distress. Skin: There is no bruise or rash. HEENT: The exam is unremarkable. Neck: Supple without lymphadenopathy or thyromegaly. Lungs: Clear to auscultation and percussion; there are no wheezes or rhonchi. Heart: Regular rate and rhythm; there are no murmurs, gallops, or rubs. Abdomen: Bowel sounds are present and normal.  There is no guarding, tenderness, or hepatosplenomegaly. Extremities: There is no clubbing, cyanosis, or edema. Neurologic: There are no focal neurologic deficits. Lymphatics: There is no palpable peripheral lymphadenopathy.  Musculoskeletal: The patient has full range of motion at all joints. There is no evidence of joint deformity or effusions. There is no focal joint tenderness. Psychological/psychiatric: There is no clinical evidence of anxiety, depression, or melancholy. Lab data:      Results for orders placed or performed during the hospital encounter of 06/26/20   CBC WITH 3 PART DIFF     Status: Abnormal   Result Value Ref Range Status    WBC 4.6 4.5 - 13.0 K/uL Final    RBC 4.93 4.10 - 5.10 M/uL Final    HGB 14.2 12.0 - 16 g/dL Final    HCT 42.9 36 - 48 % Final    MCV 87.0 78 - 102 FL Final    MCH 28.8 25.0 - 35.0 PG Final    MCHC 33.1 31 - 37 g/dL Final    RDW 16.8 (H) 11.5 - 14.5 % Final    PLATELET 615 861 - 951 K/uL Final    NEUTROPHILS 57 40 - 70 % Final    Mixed cells 6 0.1 - 17 % Final    LYMPHOCYTES 37 14 - 44 % Final    ABS. NEUTROPHILS 2.6 1.8 - 9.5 K/UL Final    ABS. MIXED CELLS 0.3 0.0 - 2.3 K/uL Final    ABS. LYMPHOCYTES 1.7 1.1 - 5.9 K/UL Final     Comment: Test performed at [address] [place] [address] or Outpatient Infusion Center Location. Reviewed by Medical Director. DF AUTOMATED   Final           Assessment:     1. Iron deficiency anemia due to chronic blood loss          Plan:   Iron deficiency anemia:  -- She was ordered treatment for the iron deficiency anemia with Injectafer at a dose of 750 mg given over 1 hour for 2 doses and this was completed on 4/1/2020.  -- She has completed iron infusion in the form of Venofor x 3 doses on 4/26/2021    -- 06/30/2021 CBC reported hemoglobin of 11.9, hematocrit of 40.6%, platelet of 320,903. Iron profile showed iron saturation of 7% and ferritin was 6.  -- Previously her VERONICA had positive response with Injectafer and appeared doing better than Venofer therapy. Due to insurance issues, her IV Iron was then switched to Ferrlecit weekly. However she could not complete therapy course d/t side effects.    -- Today I have reviewed with the patient about recent lab reports. 7/11/2022 H/H 11.1/37.1, Iron sat 5%, Ferritin 4, TIBC 455. Plan:  -- IV Iron therapy: Injectafer 750 mg x 2 doses, 1 week apart. -- She will continue to follow up with her GYN for menorrhagia. -- We will repeat CBC, CMP, Iron profiles, ferritin post IV Iron. -- RTC in 4 months, always sooner if required. Vitamin D Deficiency:   -- She will follows up her PCP for further replacement. No orders of the defined types were placed in this encounter. Ms. Rhonda Rosado has a reminder for a \"due or due soon\" health maintenance. I have asked that she contact her primary care provider for follow-up on this health maintenance. All of patient's questions answered to their apparent satisfaction. They verbally show understanding and agreement with aforementioned plan. Nicol Gutierrez MD  8/9/2022          Parts of this document has been produced using Dragon dictation system. Unrecognized errors in transcription may be present. Please do not hesitate to reach out for any questions or clarifications.       CC: Braeden Chadwick MD

## 2022-08-15 RX ORDER — DIPHENHYDRAMINE HYDROCHLORIDE 50 MG/ML
50 INJECTION, SOLUTION INTRAMUSCULAR; INTRAVENOUS AS NEEDED
Status: CANCELLED
Start: 2022-09-12

## 2022-08-15 RX ORDER — EPINEPHRINE 1 MG/ML
0.3 INJECTION, SOLUTION, CONCENTRATE INTRAVENOUS AS NEEDED
Status: CANCELLED | OUTPATIENT
Start: 2022-09-12

## 2022-08-15 RX ORDER — SODIUM CHLORIDE 0.9 % (FLUSH) 0.9 %
5-40 SYRINGE (ML) INJECTION AS NEEDED
Status: CANCELLED | OUTPATIENT
Start: 2022-09-12

## 2022-08-15 RX ORDER — DIPHENHYDRAMINE HYDROCHLORIDE 50 MG/ML
25 INJECTION, SOLUTION INTRAMUSCULAR; INTRAVENOUS AS NEEDED
Status: CANCELLED
Start: 2022-09-12

## 2022-08-15 RX ORDER — HEPARIN 100 UNIT/ML
500 SYRINGE INTRAVENOUS AS NEEDED
Status: CANCELLED
Start: 2022-09-12

## 2022-08-15 RX ORDER — SODIUM CHLORIDE 9 MG/ML
5-250 INJECTION, SOLUTION INTRAVENOUS AS NEEDED
Status: CANCELLED | OUTPATIENT
Start: 2022-09-12

## 2022-08-15 RX ORDER — ACETAMINOPHEN 325 MG/1
650 TABLET ORAL AS NEEDED
Status: CANCELLED
Start: 2022-09-12

## 2022-08-15 RX ORDER — SODIUM CHLORIDE 9 MG/ML
5-40 INJECTION INTRAMUSCULAR; INTRAVENOUS; SUBCUTANEOUS AS NEEDED
Status: CANCELLED | OUTPATIENT
Start: 2022-09-12

## 2022-08-15 RX ORDER — ONDANSETRON 2 MG/ML
8 INJECTION INTRAMUSCULAR; INTRAVENOUS AS NEEDED
Status: CANCELLED | OUTPATIENT
Start: 2022-09-12

## 2022-08-15 RX ORDER — HYDROCORTISONE SODIUM SUCCINATE 100 MG/2ML
100 INJECTION, POWDER, FOR SOLUTION INTRAMUSCULAR; INTRAVENOUS AS NEEDED
Status: CANCELLED | OUTPATIENT
Start: 2022-09-12

## 2022-08-15 RX ORDER — ALBUTEROL SULFATE 0.83 MG/ML
2.5 SOLUTION RESPIRATORY (INHALATION) AS NEEDED
Status: CANCELLED
Start: 2022-09-12

## 2022-08-22 ENCOUNTER — HOSPITAL ENCOUNTER (OUTPATIENT)
Dept: INFUSION THERAPY | Age: 37
End: 2022-08-22

## 2022-08-22 DIAGNOSIS — D50.0 IRON DEFICIENCY ANEMIA DUE TO CHRONIC BLOOD LOSS: Primary | ICD-10-CM

## 2022-08-26 ENCOUNTER — TELEPHONE (OUTPATIENT)
Dept: ONCOLOGY | Facility: CLINIC | Age: 37
End: 2022-08-26

## 2022-08-26 NOTE — TELEPHONE ENCOUNTER
The [employer] nurse stated that Tressia Filter is a non-preferred and the plan states that the patient must have tried/failed/contraindication to ALL three preferred products: Ferrlecit, Infed and Venofer. The nurse wanted to know if Dr. Ignacio Echevarria wanted to change treatment to Infed; if not, there is an option for P2P by calling [phone] with Case Ref # [id number]. She is allergic to Ferrlecit .    And patient got 3 does of venofer - last dose was 11/1/21

## 2022-08-30 DIAGNOSIS — D50.0 IRON DEFICIENCY ANEMIA DUE TO CHRONIC BLOOD LOSS: Primary | ICD-10-CM

## 2022-08-30 RX ORDER — EPINEPHRINE 1 MG/ML
0.3 INJECTION, SOLUTION, CONCENTRATE INTRAVENOUS AS NEEDED
OUTPATIENT
Start: 2022-08-30

## 2022-08-30 RX ORDER — HYDROCORTISONE SODIUM SUCCINATE 100 MG/2ML
100 INJECTION, POWDER, FOR SOLUTION INTRAMUSCULAR; INTRAVENOUS AS NEEDED
OUTPATIENT
Start: 2022-08-30

## 2022-08-30 RX ORDER — DIPHENHYDRAMINE HYDROCHLORIDE 50 MG/ML
50 INJECTION, SOLUTION INTRAMUSCULAR; INTRAVENOUS AS NEEDED
Start: 2022-08-30

## 2022-08-30 RX ORDER — ACETAMINOPHEN 325 MG/1
650 TABLET ORAL AS NEEDED
Start: 2022-08-30

## 2022-08-30 RX ORDER — ALBUTEROL SULFATE 0.83 MG/ML
2.5 SOLUTION RESPIRATORY (INHALATION) AS NEEDED
Start: 2022-08-30

## 2022-08-30 RX ORDER — SODIUM CHLORIDE 9 MG/ML
5-40 INJECTION INTRAMUSCULAR; INTRAVENOUS; SUBCUTANEOUS AS NEEDED
OUTPATIENT
Start: 2022-08-30

## 2022-08-30 RX ORDER — ONDANSETRON 2 MG/ML
8 INJECTION INTRAMUSCULAR; INTRAVENOUS AS NEEDED
OUTPATIENT
Start: 2022-08-30

## 2022-08-30 RX ORDER — SODIUM CHLORIDE 9 MG/ML
5-250 INJECTION, SOLUTION INTRAVENOUS AS NEEDED
OUTPATIENT
Start: 2022-08-30

## 2022-08-30 RX ORDER — HEPARIN 100 UNIT/ML
500 SYRINGE INTRAVENOUS AS NEEDED
Start: 2022-08-30

## 2022-08-30 RX ORDER — DIPHENHYDRAMINE HYDROCHLORIDE 50 MG/ML
25 INJECTION, SOLUTION INTRAMUSCULAR; INTRAVENOUS AS NEEDED
Start: 2022-08-30

## 2022-08-30 RX ORDER — SODIUM CHLORIDE 0.9 % (FLUSH) 0.9 %
5-40 SYRINGE (ML) INJECTION AS NEEDED
OUTPATIENT
Start: 2022-08-30

## 2022-09-12 ENCOUNTER — HOSPITAL ENCOUNTER (OUTPATIENT)
Dept: INFUSION THERAPY | Age: 37
End: 2022-09-12

## 2022-11-25 ENCOUNTER — TELEPHONE (OUTPATIENT)
Age: 37
End: 2022-11-25

## 2022-11-25 NOTE — TELEPHONE ENCOUNTER
Patient contacted office today to report:     She is still awaiting her iron infusion and to date an authorization for injectafer has not been obtained. She has tried the 2 preferred iron infusions and one did not work and the other one she had an allergic reaction to it. Patient insurance has approved Venofer but patient reported she does not want this iv iron because it did not work. We are unable to get her insurance to approve Injectafer. Also patient would like a call back from you'll to get clarity on what is going on. Please contact patient to talk this over with her, she told your PSR that's working today that she hasn't had iron so there was no need for her to come in for her labs on the 28th and office visit in December.

## 2022-11-28 DIAGNOSIS — D50.0 IRON DEFICIENCY ANEMIA DUE TO CHRONIC BLOOD LOSS: Primary | ICD-10-CM

## 2022-11-29 ENCOUNTER — APPOINTMENT (OUTPATIENT)
Dept: ONCOLOGY | Age: 37
End: 2022-11-29

## 2022-11-29 ENCOUNTER — HOSPITAL ENCOUNTER (OUTPATIENT)
Dept: LAB | Age: 37
Discharge: HOME | End: 2022-11-29
Payer: COMMERCIAL

## 2022-11-29 DIAGNOSIS — D50.0 IRON DEFICIENCY ANEMIA DUE TO CHRONIC BLOOD LOSS: ICD-10-CM

## 2022-11-29 LAB
ALBUMIN SERPL-MCNC: 3.7 G/DL (ref 3.4–5)
ALBUMIN/GLOB SERPL: 1.2 {RATIO} (ref 0.8–1.7)
ALP SERPL-CCNC: 63 U/L (ref 45–117)
ALT SERPL-CCNC: 21 U/L (ref 13–56)
ANION GAP SERPL CALC-SCNC: 6 MMOL/L (ref 3–18)
AST SERPL-CCNC: 14 U/L (ref 10–38)
BASOPHILS # BLD: 0 K/UL (ref 0–0.1)
BASOPHILS NFR BLD: 0 % (ref 0–2)
BILIRUB SERPL-MCNC: 0.5 MG/DL (ref 0.2–1)
BUN SERPL-MCNC: 17 MG/DL (ref 7–18)
BUN/CREAT SERPL: 20 (ref 12–20)
CALCIUM SERPL-MCNC: 8.9 MG/DL (ref 8.5–10.1)
CHLORIDE SERPL-SCNC: 108 MMOL/L (ref 100–111)
CO2 SERPL-SCNC: 27 MMOL/L (ref 21–32)
CREAT SERPL-MCNC: 0.87 MG/DL (ref 0.6–1.3)
DIFFERENTIAL METHOD BLD: ABNORMAL
EOSINOPHIL # BLD: 0.1 K/UL (ref 0–0.4)
EOSINOPHIL NFR BLD: 3 % (ref 0–5)
ERYTHROCYTE [DISTWIDTH] IN BLOOD BY AUTOMATED COUNT: 23.9 % (ref 11.6–14.5)
FERRITIN SERPL-MCNC: 6 NG/ML (ref 8–388)
GLOBULIN SER CALC-MCNC: 3 G/DL (ref 2–4)
GLUCOSE SERPL-MCNC: 95 MG/DL (ref 74–99)
HCT VFR BLD AUTO: 30.6 % (ref 35–45)
HGB BLD-MCNC: 8.3 G/DL (ref 12–16)
IMM GRANULOCYTES # BLD AUTO: 0 K/UL
IMM GRANULOCYTES NFR BLD AUTO: 0 %
IRON SATN MFR SERPL: 3 % (ref 20–50)
IRON SERPL-MCNC: 12 UG/DL (ref 50–175)
LYMPHOCYTES # BLD: 1.5 K/UL (ref 0.9–3.6)
LYMPHOCYTES NFR BLD: 35 % (ref 21–52)
MCH RBC QN AUTO: 21.7 PG (ref 24–34)
MCHC RBC AUTO-ENTMCNC: 27.1 G/DL (ref 31–37)
MCV RBC AUTO: 79.9 FL (ref 78–100)
MONOCYTES # BLD: 0.2 K/UL (ref 0.05–1.2)
MONOCYTES NFR BLD: 5 % (ref 3–10)
NEUTS SEG # BLD: 2.6 K/UL (ref 1.8–8)
NEUTS SEG NFR BLD: 57 % (ref 40–73)
NRBC # BLD: 0 K/UL (ref 0–0.01)
NRBC BLD-RTO: 0 PER 100 WBC
PLATELET # BLD AUTO: 252 K/UL (ref 135–420)
PLATELET COMMENTS,PCOM: ABNORMAL
PMV BLD AUTO: 10.3 FL (ref 9.2–11.8)
POTASSIUM SERPL-SCNC: 4.3 MMOL/L (ref 3.5–5.5)
PROT SERPL-MCNC: 6.7 G/DL (ref 6.4–8.2)
RBC # BLD AUTO: 3.83 M/UL (ref 4.2–5.3)
RBC MORPH BLD: ABNORMAL
SODIUM SERPL-SCNC: 141 MMOL/L (ref 136–145)
TIBC SERPL-MCNC: 424 UG/DL (ref 250–450)
WBC # BLD AUTO: 4.4 K/UL (ref 4.6–13.2)

## 2022-11-29 PROCEDURE — 83540 ASSAY OF IRON: CPT

## 2022-11-29 PROCEDURE — 85025 COMPLETE CBC W/AUTO DIFF WBC: CPT

## 2022-11-29 PROCEDURE — 82728 ASSAY OF FERRITIN: CPT

## 2022-11-29 PROCEDURE — 80053 COMPREHEN METABOLIC PANEL: CPT

## 2022-11-29 PROCEDURE — 36415 COLL VENOUS BLD VENIPUNCTURE: CPT

## 2022-11-30 ENCOUNTER — TELEPHONE (OUTPATIENT)
Dept: ONCOLOGY | Age: 37
End: 2022-11-30

## 2022-11-30 DIAGNOSIS — D50.0 IRON DEFICIENCY ANEMIA DUE TO CHRONIC BLOOD LOSS: Primary | ICD-10-CM

## 2022-11-30 RX ORDER — DIPHENHYDRAMINE HYDROCHLORIDE 50 MG/ML
25 INJECTION, SOLUTION INTRAMUSCULAR; INTRAVENOUS AS NEEDED
Start: 2022-11-30

## 2022-11-30 RX ORDER — DIPHENHYDRAMINE HYDROCHLORIDE 50 MG/ML
50 INJECTION, SOLUTION INTRAMUSCULAR; INTRAVENOUS AS NEEDED
Start: 2022-11-30

## 2022-11-30 RX ORDER — ALBUTEROL SULFATE 0.83 MG/ML
2.5 SOLUTION RESPIRATORY (INHALATION) AS NEEDED
Start: 2022-11-30

## 2022-11-30 RX ORDER — SODIUM CHLORIDE 9 MG/ML
5-40 INJECTION INTRAMUSCULAR; INTRAVENOUS; SUBCUTANEOUS AS NEEDED
OUTPATIENT
Start: 2022-11-30

## 2022-11-30 RX ORDER — SODIUM CHLORIDE 0.9 % (FLUSH) 0.9 %
5-40 SYRINGE (ML) INJECTION AS NEEDED
OUTPATIENT
Start: 2022-11-30

## 2022-11-30 RX ORDER — HEPARIN 100 UNIT/ML
500 SYRINGE INTRAVENOUS AS NEEDED
Start: 2022-11-30

## 2022-11-30 RX ORDER — SODIUM CHLORIDE 9 MG/ML
5-250 INJECTION, SOLUTION INTRAVENOUS AS NEEDED
OUTPATIENT
Start: 2022-11-30

## 2022-11-30 RX ORDER — ACETAMINOPHEN 325 MG/1
650 TABLET ORAL AS NEEDED
Start: 2022-11-30

## 2022-11-30 RX ORDER — EPINEPHRINE 1 MG/ML
0.3 INJECTION, SOLUTION, CONCENTRATE INTRAVENOUS AS NEEDED
OUTPATIENT
Start: 2022-11-30

## 2022-11-30 RX ORDER — HYDROCORTISONE SODIUM SUCCINATE 100 MG/2ML
100 INJECTION, POWDER, FOR SOLUTION INTRAMUSCULAR; INTRAVENOUS AS NEEDED
OUTPATIENT
Start: 2022-11-30

## 2022-11-30 RX ORDER — ONDANSETRON 2 MG/ML
8 INJECTION INTRAMUSCULAR; INTRAVENOUS AS NEEDED
OUTPATIENT
Start: 2022-11-30

## 2022-11-30 NOTE — TELEPHONE ENCOUNTER
Patient came an got labs done as requested. IV IRON Venofer was ordered. Pt does not want venofer she claims it does not work. Insurance will not approve injectafer. And she is allergic to Ferrlecit.   Please call patient

## 2022-12-12 ENCOUNTER — VIRTUAL VISIT (OUTPATIENT)
Dept: ONCOLOGY | Age: 37
End: 2022-12-12

## 2022-12-12 DIAGNOSIS — D50.0 IRON DEFICIENCY ANEMIA DUE TO CHRONIC BLOOD LOSS: Primary | ICD-10-CM

## 2022-12-12 PROCEDURE — 99442 PR PHYS/QHP TELEPHONE EVALUATION 11-20 MIN: CPT | Performed by: NURSE PRACTITIONER

## 2022-12-12 RX ORDER — HYDROCORTISONE SODIUM SUCCINATE 100 MG/2ML
100 INJECTION, POWDER, FOR SOLUTION INTRAMUSCULAR; INTRAVENOUS AS NEEDED
OUTPATIENT
Start: 2022-12-23

## 2022-12-12 RX ORDER — SODIUM CHLORIDE 9 MG/ML
5-250 INJECTION, SOLUTION INTRAVENOUS AS NEEDED
OUTPATIENT
Start: 2022-12-23

## 2022-12-12 RX ORDER — HEPARIN 100 UNIT/ML
500 SYRINGE INTRAVENOUS AS NEEDED
Start: 2022-12-23

## 2022-12-12 RX ORDER — DIPHENHYDRAMINE HYDROCHLORIDE 50 MG/ML
50 INJECTION, SOLUTION INTRAMUSCULAR; INTRAVENOUS AS NEEDED
Start: 2022-12-23

## 2022-12-12 RX ORDER — ALBUTEROL SULFATE 0.83 MG/ML
2.5 SOLUTION RESPIRATORY (INHALATION) AS NEEDED
Start: 2022-12-23

## 2022-12-12 RX ORDER — ONDANSETRON 2 MG/ML
8 INJECTION INTRAMUSCULAR; INTRAVENOUS AS NEEDED
OUTPATIENT
Start: 2022-12-23

## 2022-12-12 RX ORDER — DIPHENHYDRAMINE HYDROCHLORIDE 50 MG/ML
25 INJECTION, SOLUTION INTRAMUSCULAR; INTRAVENOUS AS NEEDED
Start: 2022-12-23

## 2022-12-12 RX ORDER — SODIUM CHLORIDE 0.9 % (FLUSH) 0.9 %
5-40 SYRINGE (ML) INJECTION AS NEEDED
OUTPATIENT
Start: 2022-12-23

## 2022-12-12 RX ORDER — SODIUM CHLORIDE 9 MG/ML
5-40 INJECTION INTRAMUSCULAR; INTRAVENOUS; SUBCUTANEOUS AS NEEDED
OUTPATIENT
Start: 2022-12-23

## 2022-12-12 RX ORDER — ACETAMINOPHEN 325 MG/1
650 TABLET ORAL AS NEEDED
Start: 2022-12-23

## 2022-12-12 RX ORDER — EPINEPHRINE 1 MG/ML
0.3 INJECTION, SOLUTION, CONCENTRATE INTRAVENOUS AS NEEDED
OUTPATIENT
Start: 2022-12-23

## 2022-12-12 NOTE — PROGRESS NOTES
Enrique Benitez is a 44 y.o. female, evaluated via audio-only technology on 12/12/2022 for No chief complaint on file. .    Assessment & Plan:   Iron deficiency anemia:  -- She was ordered treatment for the iron deficiency anemia with Injectafer at a dose of 750 mg given over 1 hour for 2 doses and this was completed on 4/1/2020.  -- She has completed iron infusion in the form of Venofor x 3 doses on 4/26/2021    -- 07/11/2022 CBC reported hemoglobin of 11.1, hematocrit of 37.1%, platelet of 666,459. Iron profile showed iron saturation of 4% and ferritin was 5.  -- Previously her VERONICA had positive response with Injectafer and appeared doing better than Venofer therapy. Due to insurance issues, her IV Iron was then switched to Ferrlecit weekly. However she could not complete therapy course d/t side effects. The office did several appeal but her insurance decline and want her to try Venofer at this time  -- Today I have reviewed with the patient about recent lab reports. 11/29/2022  H/H 8.3/30.6, Iron sat 3%, Ferritin 3, TIBC 424.  -- Patient states she lot her job and does not have insurance at this time. We will see if she can get free drug from the companies who make Venofer or Injectafer      Plan:  -- IV Iron therapy: Injectafer 750 mg x 2 doses, 1 week apart has been ordered pending free drug approval    -- She will continue to follow up with her GYN for menorrhagia. -- We will repeat CBC, CMP, Iron profiles, ferritin post IV Iron. -- RTC in 4 months, always sooner if required. Vitamin D Deficiency:   -- She will follows up her PCP for further replacement. Subjective:   Ms. Alon Munoz is a 70-year-old woman who is being managed for anemia on 2/25/2020. She has completed Injectafer at a dose of 750 mg given over 1 hour for 2 doses on 4/1/2020. She was recently was given Ferrliecit completed 6 of 8 required infusion and had a reaction from this. Today she report fatigue and low energy level.  She continues to have heavy periods. She has f/u with GYN for possible procedure. She denied fever, chills, night sweat, unintentional weight loss, skin lumps or bumps, acute bleeding or bruising issues at this time. Denied headache, acute vision change, dizziness, chest pain, worsen shortness of breath, palpitation, productive cough, nausea, vomiting, abdominal pain, altered bowel habits, dysuria, new bone pain or back pain, focal numbness or weakness. Prior to Admission medications    Medication Sig Start Date End Date Taking? Authorizing Provider   meloxicam (MOBIC) 15 mg tablet Take 15 mg by mouth daily. Provider, Historical   cyclobenzaprine (FLEXERIL) 5 mg tablet Take 10 mg by mouth three (3) times daily as needed for Muscle Spasm(s). Provider, Historical   ashwagandha root extract 300 mg cap Take 1 Tablet by mouth every Monday and Friday. Provider, Historical   ascorbic acid, vitamin C, (Vitamin C) 250 mg tablet Take 250 mg by mouth daily. Provider, Historical   cholecalciferol (Vitamin D3) 25 mcg (1,000 unit) cap Take 1,000 Units by mouth daily. Provider, Historical   ferrous sulfate 220 mg (44 mg iron)/5 mL solution Take  by mouth daily. Provider, Historical   bisacodyL (DULCOLAX, BISACODYL,) 5 mg EC tablet Take 5 mg by mouth daily as needed for Constipation. Provider, Historical   ibuprofen (MOTRIN) 800 mg tablet Take 800 mg by mouth every eight (8) hours as needed for Pain. Provider, Historical   naproxen sodium (ALEVE) 220 mg cap Take  by mouth. Provider, Historical         Review of Systems   Constitutional:  Positive for malaise/fatigue. HENT: Negative. Eyes: Negative. Respiratory: Negative. Cardiovascular: Negative. Gastrointestinal: Negative. Genitourinary: Negative. Musculoskeletal: Negative. Skin: Negative. Neurological: Negative. Endo/Heme/Allergies: Negative. Psychiatric/Behavioral: Negative.        Patient-Reported Weight: 183lb Merced Cotto was evaluated through a patient-initiated, synchronous (real-time) audio only encounter. She (or guardian if applicable) is aware that it is a billable service, which includes applicable co-pays, with coverage as determined by her insurance carrier. This visit was conducted with the patient's (and/or Joanna Beauchamp guardian's) verbal consent. She has not had a related appointment within my department in the past 7 days or scheduled within the next 24 hours. The patient was located in a state where the provider was licensed to provide care.       Total Time: minutes: 11-20 minutes    Karen Jaramillo DNP

## 2022-12-23 ENCOUNTER — HOSPITAL ENCOUNTER (OUTPATIENT)
Dept: INFUSION THERAPY | Age: 37
End: 2022-12-23
Payer: MEDICAID

## 2022-12-23 VITALS
DIASTOLIC BLOOD PRESSURE: 74 MMHG | SYSTOLIC BLOOD PRESSURE: 108 MMHG | RESPIRATION RATE: 18 BRPM | TEMPERATURE: 98.6 F | OXYGEN SATURATION: 100 % | HEART RATE: 91 BPM

## 2022-12-23 DIAGNOSIS — D50.0 IRON DEFICIENCY ANEMIA DUE TO CHRONIC BLOOD LOSS: Primary | ICD-10-CM

## 2022-12-23 PROCEDURE — 74011000250 HC RX REV CODE- 250: Performed by: NURSE PRACTITIONER

## 2022-12-23 PROCEDURE — 96360 HYDRATION IV INFUSION INIT: CPT

## 2022-12-23 PROCEDURE — 74011250636 HC RX REV CODE- 250/636: Performed by: NURSE PRACTITIONER

## 2022-12-23 PROCEDURE — 96365 THER/PROPH/DIAG IV INF INIT: CPT

## 2022-12-23 RX ORDER — DIPHENHYDRAMINE HYDROCHLORIDE 50 MG/ML
25 INJECTION, SOLUTION INTRAMUSCULAR; INTRAVENOUS AS NEEDED
Status: CANCELLED
Start: 2022-12-30

## 2022-12-23 RX ORDER — EPINEPHRINE 1 MG/ML
0.3 INJECTION, SOLUTION, CONCENTRATE INTRAVENOUS AS NEEDED
Status: CANCELLED | OUTPATIENT
Start: 2022-12-30

## 2022-12-23 RX ORDER — SODIUM CHLORIDE 9 MG/ML
5-250 INJECTION, SOLUTION INTRAVENOUS AS NEEDED
Status: CANCELLED | OUTPATIENT
Start: 2022-12-30

## 2022-12-23 RX ORDER — SODIUM CHLORIDE 0.9 % (FLUSH) 0.9 %
5-40 SYRINGE (ML) INJECTION AS NEEDED
Status: CANCELLED | OUTPATIENT
Start: 2022-12-30

## 2022-12-23 RX ORDER — HYDROCORTISONE SODIUM SUCCINATE 100 MG/2ML
100 INJECTION, POWDER, FOR SOLUTION INTRAMUSCULAR; INTRAVENOUS AS NEEDED
Status: CANCELLED | OUTPATIENT
Start: 2022-12-30

## 2022-12-23 RX ORDER — DIPHENHYDRAMINE HYDROCHLORIDE 50 MG/ML
50 INJECTION, SOLUTION INTRAMUSCULAR; INTRAVENOUS AS NEEDED
Status: CANCELLED
Start: 2022-12-30

## 2022-12-23 RX ORDER — SODIUM CHLORIDE 9 MG/ML
5-250 INJECTION, SOLUTION INTRAVENOUS AS NEEDED
Status: DISCONTINUED | OUTPATIENT
Start: 2022-12-23 | End: 2022-12-24 | Stop reason: HOSPADM

## 2022-12-23 RX ORDER — HEPARIN 100 UNIT/ML
500 SYRINGE INTRAVENOUS AS NEEDED
Status: CANCELLED
Start: 2022-12-30

## 2022-12-23 RX ORDER — SODIUM CHLORIDE 0.9 % (FLUSH) 0.9 %
5-40 SYRINGE (ML) INJECTION AS NEEDED
Status: DISCONTINUED | OUTPATIENT
Start: 2022-12-23 | End: 2022-12-24 | Stop reason: HOSPADM

## 2022-12-23 RX ORDER — ONDANSETRON 2 MG/ML
8 INJECTION INTRAMUSCULAR; INTRAVENOUS AS NEEDED
Status: CANCELLED | OUTPATIENT
Start: 2022-12-30

## 2022-12-23 RX ORDER — ALBUTEROL SULFATE 0.83 MG/ML
2.5 SOLUTION RESPIRATORY (INHALATION) AS NEEDED
Status: CANCELLED
Start: 2022-12-30

## 2022-12-23 RX ORDER — ACETAMINOPHEN 325 MG/1
650 TABLET ORAL AS NEEDED
Status: CANCELLED
Start: 2022-12-30

## 2022-12-23 RX ORDER — SODIUM CHLORIDE 9 MG/ML
5-40 INJECTION INTRAMUSCULAR; INTRAVENOUS; SUBCUTANEOUS AS NEEDED
Status: CANCELLED | OUTPATIENT
Start: 2022-12-30

## 2022-12-23 RX ADMIN — SODIUM CHLORIDE, PRESERVATIVE FREE 10 ML: 5 INJECTION INTRAVENOUS at 13:56

## 2022-12-23 RX ADMIN — FERRIC CARBOXYMALTOSE INJECTION 750 MG: 50 INJECTION, SOLUTION INTRAVENOUS at 13:56

## 2022-12-23 RX ADMIN — SODIUM CHLORIDE, PRESERVATIVE FREE 20 ML: 5 INJECTION INTRAVENOUS at 14:18

## 2022-12-23 RX ADMIN — SODIUM CHLORIDE 250 ML/HR: 9 INJECTION, SOLUTION INTRAVENOUS at 13:56

## 2022-12-23 NOTE — PROGRESS NOTES
[facility] Progress Note    Date: 2022    Name: Anila Gold    MRN: [id number]         : 1985    Trav Umana #1 OF 2    Ms. Mirian Voss arrived at [facility] at [phone]    Patient was assessed and education was provided. Patient states she has been tolerating ferriecit infusions. Injectafer carenotes read to patient and copy sent home with patient. Ms. Ashley Vogel vitals were reviewed. Visit Vitals  /86 (BP 1 Location: Right upper arm, BP Patient Position: Sitting)   Pulse 95   Temp 98.3 °F (36.8 °C)   Resp 18   SpO2 99%   Breastfeeding No     22GA PIV started to RT FA x2 attempts. Flushed well with NS with positive blood return. FERRIC CARBOXYMALTOSE 750MG MIXED IN 250CC NS GIVEN OVER 20 MINUTES AS ORDERED. Upon completion Ms. Mirian Voss observed for 30 minutes. At end of observation period; Ms. Mirian Voss denies any signs or symptoms of an allergic reaction. Vitals completed. PIV removed; 2x2 and coban applied. Patient Vitals for the past 12 hrs:   Temp Pulse Resp BP SpO2   22 1340 98.3 °F (36.8 °C) 95 18 120/86 99 %         Discharge/ follow-up instructions discussed w/ patient. Patient verbalized understanding. Patient's armband removed and shredded. Ms. Mirian Voss was discharged from [place] [address] in stable condition at 1450. Ms. Ashley Vogel next appointment here at [facility] is on 22 at 1300.     Meredith Glynn RN  2022

## 2022-12-30 ENCOUNTER — HOSPITAL ENCOUNTER (OUTPATIENT)
Dept: INFUSION THERAPY | Age: 37
End: 2022-12-30
Payer: MEDICAID

## 2022-12-30 VITALS
DIASTOLIC BLOOD PRESSURE: 75 MMHG | HEART RATE: 78 BPM | TEMPERATURE: 98 F | OXYGEN SATURATION: 100 % | SYSTOLIC BLOOD PRESSURE: 107 MMHG | RESPIRATION RATE: 18 BRPM

## 2022-12-30 DIAGNOSIS — D50.0 IRON DEFICIENCY ANEMIA DUE TO CHRONIC BLOOD LOSS: Primary | ICD-10-CM

## 2022-12-30 PROCEDURE — 74011250636 HC RX REV CODE- 250/636: Performed by: NURSE PRACTITIONER

## 2022-12-30 PROCEDURE — 74011000250 HC RX REV CODE- 250: Performed by: NURSE PRACTITIONER

## 2022-12-30 PROCEDURE — 96365 THER/PROPH/DIAG IV INF INIT: CPT

## 2022-12-30 RX ORDER — SODIUM CHLORIDE 9 MG/ML
5-250 INJECTION, SOLUTION INTRAVENOUS AS NEEDED
Status: CANCELLED | OUTPATIENT
Start: 2023-01-06

## 2022-12-30 RX ORDER — SODIUM CHLORIDE 0.9 % (FLUSH) 0.9 %
5-40 SYRINGE (ML) INJECTION AS NEEDED
Status: DISPENSED | OUTPATIENT
Start: 2022-12-30 | End: 2022-12-30

## 2022-12-30 RX ORDER — ONDANSETRON 2 MG/ML
8 INJECTION INTRAMUSCULAR; INTRAVENOUS AS NEEDED
Status: CANCELLED | OUTPATIENT
Start: 2023-01-06

## 2022-12-30 RX ORDER — HEPARIN 100 UNIT/ML
500 SYRINGE INTRAVENOUS AS NEEDED
Status: CANCELLED
Start: 2023-01-06

## 2022-12-30 RX ORDER — ACETAMINOPHEN 325 MG/1
650 TABLET ORAL AS NEEDED
Status: CANCELLED
Start: 2023-01-06

## 2022-12-30 RX ORDER — SODIUM CHLORIDE 9 MG/ML
5-250 INJECTION, SOLUTION INTRAVENOUS AS NEEDED
Status: DISPENSED | OUTPATIENT
Start: 2022-12-30 | End: 2022-12-30

## 2022-12-30 RX ORDER — DIPHENHYDRAMINE HYDROCHLORIDE 50 MG/ML
50 INJECTION, SOLUTION INTRAMUSCULAR; INTRAVENOUS AS NEEDED
Status: CANCELLED
Start: 2023-01-06

## 2022-12-30 RX ORDER — SODIUM CHLORIDE 9 MG/ML
5-40 INJECTION INTRAMUSCULAR; INTRAVENOUS; SUBCUTANEOUS AS NEEDED
Status: CANCELLED | OUTPATIENT
Start: 2023-01-06

## 2022-12-30 RX ORDER — SODIUM CHLORIDE 0.9 % (FLUSH) 0.9 %
5-40 SYRINGE (ML) INJECTION AS NEEDED
Status: CANCELLED | OUTPATIENT
Start: 2023-01-06

## 2022-12-30 RX ORDER — ALBUTEROL SULFATE 0.83 MG/ML
2.5 SOLUTION RESPIRATORY (INHALATION) AS NEEDED
Status: CANCELLED
Start: 2023-01-06

## 2022-12-30 RX ORDER — HYDROCORTISONE SODIUM SUCCINATE 100 MG/2ML
100 INJECTION, POWDER, FOR SOLUTION INTRAMUSCULAR; INTRAVENOUS AS NEEDED
Status: CANCELLED | OUTPATIENT
Start: 2023-01-06

## 2022-12-30 RX ORDER — DIPHENHYDRAMINE HYDROCHLORIDE 50 MG/ML
25 INJECTION, SOLUTION INTRAMUSCULAR; INTRAVENOUS AS NEEDED
Status: CANCELLED
Start: 2023-01-06

## 2022-12-30 RX ORDER — EPINEPHRINE 1 MG/ML
0.3 INJECTION, SOLUTION, CONCENTRATE INTRAVENOUS AS NEEDED
Status: CANCELLED | OUTPATIENT
Start: 2023-01-06

## 2022-12-30 RX ADMIN — SODIUM CHLORIDE, PRESERVATIVE FREE 10 ML: 5 INJECTION INTRAVENOUS at 09:51

## 2022-12-30 RX ADMIN — SODIUM CHLORIDE, PRESERVATIVE FREE 10 ML: 5 INJECTION INTRAVENOUS at 09:18

## 2022-12-30 RX ADMIN — SODIUM CHLORIDE, PRESERVATIVE FREE 10 ML: 5 INJECTION INTRAVENOUS at 09:50

## 2022-12-30 RX ADMIN — FERRIC CARBOXYMALTOSE INJECTION 750 MG: 50 INJECTION, SOLUTION INTRAVENOUS at 09:28

## 2022-12-30 RX ADMIN — SODIUM CHLORIDE 25 ML/HR: 9 INJECTION, SOLUTION INTRAVENOUS at 09:28

## 2022-12-30 NOTE — PROGRESS NOTES
[facility] Progress Note    Date: 2022    Name: Leela Joshi    MRN: [id number]         : 1985    Buddy Badder #2 OF 2    Ms. María Elena Castaneda arrived at [facility] ambulatory at 9266. Patient was assessed and education was provided. Pt states she tolerated her last infusion well with out any complications. Ms. Candelario Guzman vitals were reviewed. Visit Vitals  /75   Pulse 78   Temp 98 °F (36.7 °C)   Resp 18   SpO2 100%     24GA PIV started to RT [facility] x1 attempt. Flushed well with NS with positive blood return. FERRIC CARBOXYMALTOSE 750MG MIXED IN 250CC NS GIVEN OVER 20 MINUTES AS ORDERED. Ms. María Elena Castaneda denies any signs or symptoms of an allergic reaction. Vitals stable. PIV removed; 2x2 and coban applied. Patient Vitals for the past 12 hrs:   Temp Pulse Resp BP SpO2   22 0955 -- -- -- 107/75 --   22 0911 98 °F (36.7 °C) 78 18 113/79 100 %     Discharge/ follow-up instructions discussed w/ patient. Patient verbalized understanding. Patient's armband removed and shredded. Ms. María Elena Castaneda was discharged from [place] [address] in stable condition at [phone].  Pt is to follow up with Dr. Trixie Donato, RN  2022

## 2023-03-02 DIAGNOSIS — R22.32 LOCALIZED SWELLING, MASS AND LUMP, LEFT UPPER LIMB: ICD-10-CM

## 2023-03-28 ENCOUNTER — HOSPITAL ENCOUNTER (OUTPATIENT)
Facility: HOSPITAL | Age: 38
Setting detail: RECURRING SERIES
Discharge: HOME | End: 2023-03-31
Payer: COMMERCIAL

## 2023-03-28 PROCEDURE — 97162 PT EVAL MOD COMPLEX 30 MIN: CPT

## 2023-03-28 NOTE — PROGRESS NOTES
PHYSICAL / OCCUPATIONAL THERAPY - DAILY TREATMENT NOTE (updated )  For Eval visit    Patient Name: Stefanie Mauro    Date: 3/28/2023    : 1985  Insurance: Payor: Berna Jing / Plan: [address] [place] [address] / Product Type: *No Product type* /      Patient  verified yes     Visit #   Current / Total 1 12   Time   In / Out 9:00 am 10:00 am   Pain   In / Out 0 0   Subjective Functional Status/Changes: See POC   Changes to:  Meds, Allergies, Med Hx, Sx Hx? If yes, update Summary List no       TREATMENT AREA =  Left knee pain    OBJECTIVE    Modalities Rationale:     decrease inflammation and decrease pain to improve patient's ability to progress to PLOF and address remaining functional goals. min [] Estim Unattended, type/location:                                      []  w/ice    []  w/heat    min [] Estim Attended, type/location:                                     []  w/US     []  w/ice    []  w/heat    []  TENS insruct      min []  Mechanical Traction: type/lbs                   []  pro   []  sup   []  int   []  cont    []  before manual    []  after manual    min []  Ultrasound, settings/location:      min []  Iontophoresis w/ dexamethasone, location:                                               []  take home patch       []  in clinic   10 min  unbill [x]  Ice     []  Heat    location/position: Left knee; longsitting    min []  Paraffin,  details:     min []  Vasopneumatic Device, press/temp:     min []  Miguel Muna / Clemencia Sheets: If using vaso (only need to measure limb vaso being performed on)      pre-treatment girth :       post-treatment girth :       measured at (landmark location) :      min []  Other:    Skin assessment post-treatment (if applicable):    [x]  intact    []  redness- no adverse reaction                 []redness - adverse reaction:           45 min   Eval - untimed                      Therapeutic Procedures:   Tx Min Billable or 1:1 Min (if diff from [employer])

## 2023-03-28 NOTE — THERAPY EVALUATION
[address] [place] [address] ED:[phone] Fx: [phone]  Plan of Care / Statement of Necessity for Physical Therapy Services     Patient Name: Raad Johnson : 1985   Medical   Diagnosis: Left Knee Pain Treatment Diagnosis:  M25.562  LEFT KNEE PAIN     Onset Date: 2023     Referral Source: [facility]* Start of Care [facility] [place]): 3/28/2023   Prior Hospitalization: See medical history Provider #: [id number]   Prior Level of Function: Ind and pain free with ADLs and activities of leisure   Comorbidities: Depression. Alcohol Use. Medications: Verified on Patient Summary List     Assessment / key information:    Patient is a 44year old female. Primary complaints of left knee pain that began on 2023 due to jumping in [employer] and then going to sit in a chair she felt a pop in her knee. She did pivot on her limb prior to sitting. History of left knee dislocation ~ 6-7 years ago. Locates pain at anterior medial and posterior lateral joint lines of left knee. Rates pain as 0-10/10 from best to worst. Pain today is rated as 0/10. Describes pain as soreness, and aching (deep) and intermittent in nature. Sitting/rest, methocarbamol and naproxen, ibuprofen, tylenol, icyhot/tiger balm makes the pain better. Activity, as the day goes on, Walking > 10-15 minutes; standing > 10-15 minutes makes the pain worse. Patient is not currently employed. Self-employed as a crafter and creates mugs, bags, t-shirts and journals. Patient enjoys taking part in weightlifting (machines, free weights; squatting 150 lbs). Their primary goals in PT are to improve mobility in order to take part in these activities of leisure. Patient recently saw their referring provider who prescribed OPPT. They are scheduled to return to their provider on an as needed basis.     Prior Diagnostic Tests: [] Lab work [x] X-rays    [] CT [] MRI     []

## 2023-04-05 ENCOUNTER — HOSPITAL ENCOUNTER (OUTPATIENT)
Facility: HOSPITAL | Age: 38
Setting detail: SPECIMEN
Discharge: HOME | End: 2023-04-08
Payer: COMMERCIAL

## 2023-04-05 LAB
ALBUMIN SERPL-MCNC: 3.6 G/DL (ref 3.4–5)
ALBUMIN/GLOB SERPL: 1 (ref 0.8–1.7)
ALP SERPL-CCNC: 61 U/L (ref 45–117)
ALT SERPL-CCNC: 23 U/L (ref 13–56)
ANION GAP SERPL CALC-SCNC: 6 MMOL/L (ref 3–18)
AST SERPL-CCNC: 15 U/L (ref 10–38)
BASOPHILS # BLD: 0 K/UL (ref 0–0.1)
BASOPHILS NFR BLD: 1 % (ref 0–2)
BILIRUB SERPL-MCNC: 0.4 MG/DL (ref 0.2–1)
BUN SERPL-MCNC: 19 MG/DL (ref 7–18)
BUN/CREAT SERPL: 23 (ref 12–20)
CALCIUM SERPL-MCNC: 8.7 MG/DL (ref 8.5–10.1)
CHLORIDE SERPL-SCNC: 111 MMOL/L (ref 100–111)
CO2 SERPL-SCNC: 25 MMOL/L (ref 21–32)
CREAT SERPL-MCNC: 0.84 MG/DL (ref 0.6–1.3)
DIFFERENTIAL METHOD BLD: ABNORMAL
EOSINOPHIL # BLD: 0.1 K/UL (ref 0–0.4)
EOSINOPHIL NFR BLD: 2 % (ref 0–5)
ERYTHROCYTE [DISTWIDTH] IN BLOOD BY AUTOMATED COUNT: 13.5 % (ref 11.6–14.5)
FERRITIN SERPL-MCNC: 9 NG/ML (ref 8–388)
GLOBULIN SER CALC-MCNC: 3.5 G/DL (ref 2–4)
GLUCOSE SERPL-MCNC: 83 MG/DL (ref 74–99)
HCT VFR BLD AUTO: 38.2 % (ref 35–45)
HGB BLD-MCNC: 12 G/DL (ref 12–16)
IMM GRANULOCYTES # BLD AUTO: 0 K/UL (ref 0–0.04)
IMM GRANULOCYTES NFR BLD AUTO: 0 % (ref 0–0.5)
IRON SATN MFR SERPL: 7 % (ref 20–50)
IRON SERPL-MCNC: 29 UG/DL (ref 50–175)
LYMPHOCYTES # BLD: 1.4 K/UL (ref 0.9–3.6)
LYMPHOCYTES NFR BLD: 36 % (ref 21–52)
MCH RBC QN AUTO: 27.8 PG (ref 24–34)
MCHC RBC AUTO-ENTMCNC: 31.4 G/DL (ref 31–37)
MCV RBC AUTO: 88.4 FL (ref 78–100)
MONOCYTES # BLD: 0.3 K/UL (ref 0.05–1.2)
MONOCYTES NFR BLD: 7 % (ref 3–10)
NEUTS SEG # BLD: 2.1 K/UL (ref 1.8–8)
NEUTS SEG NFR BLD: 54 % (ref 40–73)
NRBC # BLD: 0 K/UL (ref 0–0.01)
NRBC BLD-RTO: 0 PER 100 WBC
PLATELET # BLD AUTO: 210 K/UL (ref 135–420)
PMV BLD AUTO: 10.8 FL (ref 9.2–11.8)
POTASSIUM SERPL-SCNC: 4.1 MMOL/L (ref 3.5–5.5)
PROT SERPL-MCNC: 7.1 G/DL (ref 6.4–8.2)
RBC # BLD AUTO: 4.32 M/UL (ref 4.2–5.3)
SODIUM SERPL-SCNC: 142 MMOL/L (ref 136–145)
TIBC SERPL-MCNC: 389 UG/DL (ref 250–450)
WBC # BLD AUTO: 4 K/UL (ref 4.6–13.2)

## 2023-04-05 PROCEDURE — 82728 ASSAY OF FERRITIN: CPT

## 2023-04-05 PROCEDURE — 85025 COMPLETE CBC W/AUTO DIFF WBC: CPT

## 2023-04-05 PROCEDURE — 36415 COLL VENOUS BLD VENIPUNCTURE: CPT

## 2023-04-05 PROCEDURE — 83540 ASSAY OF IRON: CPT

## 2023-04-05 PROCEDURE — 80053 COMPREHEN METABOLIC PANEL: CPT

## 2023-04-11 ENCOUNTER — APPOINTMENT (OUTPATIENT)
Facility: HOSPITAL | Age: 38
End: 2023-04-11
Payer: COMMERCIAL

## 2023-04-13 ENCOUNTER — APPOINTMENT (OUTPATIENT)
Facility: HOSPITAL | Age: 38
End: 2023-04-13
Payer: COMMERCIAL

## 2023-04-18 ENCOUNTER — HOSPITAL ENCOUNTER (OUTPATIENT)
Facility: HOSPITAL | Age: 38
Setting detail: RECURRING SERIES
Discharge: HOME | End: 2023-04-21
Payer: COMMERCIAL

## 2023-04-18 PROCEDURE — 97110 THERAPEUTIC EXERCISES: CPT

## 2023-04-18 PROCEDURE — 97535 SELF CARE MNGMENT TRAINING: CPT

## 2023-04-18 NOTE — PROGRESS NOTES
PHYSICAL / OCCUPATIONAL THERAPY - DAILY TREATMENT NOTE (updated )  For Eval visit    Patient Name: Feliz Kitchen    Date: 2023    : 1985  Insurance: Payor: [facility] [address] / Plan: [facility] [address] / Product Type: *No Product type* /      Patient  verified yes     Visit #   Current / Total 2 `12   Time   In / Out 1026 AM 1059 AM   Pain   In / Out 0/10 0/10   Subjective Functional Status/Changes: Patient reports LEFT knee pain is typically minimal in the morning, hurts more when she has been walking and standing on it - closer to the end of the day. .Patient denies falls or red flags since last visit. Changes to:  Meds, Allergies, Med Hx, Sx Hx? If yes, update Summary List no       TREATMENT AREA =  Left knee pain    OBJECTIVE    Modalities Rationale:     decrease pain to improve patient's ability to progress to PLOF and address remaining functional goals. min [] Estim Unattended, type/location:                                      []  w/ice    []  w/heat    min [] Estim Attended, type/location:                                     []  w/US     []  w/ice    []  w/heat    []  TENS insruct      min []  Mechanical Traction: type/lbs                   []  pro   []  sup   []  int   []  cont    []  before manual    []  after manual    min []  Ultrasound, settings/location:      min []  Iontophoresis w/ dexamethasone, location:                                               []  take home patch       []  in clinic    min  unbill []  Ice     []  Heat    location/position:     min []  Paraffin,  details:     min []  Vasopneumatic Device, press/temp:     min []  Paz Casas / Silvestre Ackerman:     If using vaso (only need to measure limb vaso being performed on)      pre-treatment girth :       post-treatment girth :       measured at (landmark location) :      min []  Other:    Skin assessment post-treatment (if applicable):    []  intact    []  redness- no adverse reaction

## 2023-04-20 ENCOUNTER — HOSPITAL ENCOUNTER (OUTPATIENT)
Facility: HOSPITAL | Age: 38
Setting detail: RECURRING SERIES
Discharge: HOME | End: 2023-04-23
Payer: COMMERCIAL

## 2023-04-20 PROCEDURE — 97530 THERAPEUTIC ACTIVITIES: CPT

## 2023-04-20 PROCEDURE — 97110 THERAPEUTIC EXERCISES: CPT

## 2023-04-25 ENCOUNTER — HOSPITAL ENCOUNTER (OUTPATIENT)
Facility: HOSPITAL | Age: 38
Setting detail: RECURRING SERIES
Discharge: HOME | End: 2023-04-28
Payer: COMMERCIAL

## 2023-04-25 PROCEDURE — 97110 THERAPEUTIC EXERCISES: CPT

## 2023-04-25 PROCEDURE — 97112 NEUROMUSCULAR REEDUCATION: CPT

## 2023-04-25 PROCEDURE — 97530 THERAPEUTIC ACTIVITIES: CPT

## 2023-04-25 NOTE — PROGRESS NOTES
PHYSICAL / OCCUPATIONAL THERAPY - DAILY TREATMENT NOTE (updated )  For Eval visit    Patient Name: Paul Blackwell    Date: 2023    : 1985  Insurance: Payor: [facility] [address] / Plan: [facility] [address] / Product Type: *No Product type* /        Patient  verified yes     Visit #   Current / Total 4 12   Time   In / Out 1023 11:02   Pain   In / Out 0 0   Subjective Functional Status/Changes: See PN   Changes to:  Meds, Allergies, Med Hx, Sx Hx? If yes, update Summary List no       TREATMENT AREA =  Left knee pain    OBJECTIVE    Modalities Rationale:     decrease pain to improve patient's ability to progress to PLOF and address remaining functional goals. min [] Estim Unattended, type/location:                                      []  w/ice    []  w/heat    min [] Estim Attended, type/location:                                     []  w/US     []  w/ice    []  w/heat    []  TENS insruct      min []  Mechanical Traction: type/lbs                   []  pro   []  sup   []  int   []  cont    []  before manual    []  after manual    min []  Ultrasound, settings/location:      min []  Iontophoresis w/ dexamethasone, location:                                               []  take home patch       []  in clinic    min  unbill []  Ice     []  Heat    location/position:     min []  Paraffin,  details:     min []  Vasopneumatic Device, press/temp:     min []  [facility] / Adam Chock: If using vaso (only need to measure limb vaso being performed on)      pre-treatment girth :       post-treatment girth :       measured at (landmark location) :      min []  Other:    Skin assessment post-treatment (if applicable):    []  intact    []  redness- no adverse reaction                 []redness - adverse reaction:         Therapeutic Procedures:   Tx Min Billable or 1:1 Min (if diff from Tx Min) Procedure, Rationale, Specifics   Total  39 Total   [facility] Totals Reminder: bill using total billable min of

## 2023-04-25 NOTE — THERAPY RECERTIFICATION
[address],[place], [address] - Phone: [phone]  Fax: [phone]  [address] THERAPY          Patient Name: Cali Rios : 1985   Treatment/Medical Diagnosis: L Knee Pain [M25.562]   Onset Date: 2023    Referral Source: Saurabh Query* Start of Care [facility]): 3/28/23   Prior Hospitalization: See Medical History Provider #: [id number]   Prior Level of Function: Ind and pain free with ADLs and activities of leisure   Comorbidities: Depression. Alcohol Use. Medications: Verified on Patient Summary List   Visits from [facility]: 4 Missed Visits: 0     Progress to Goals:  Patient will show compliance with regular HEP performance as prescribed by PT. Status at last Eval: Issued HEP  Current Status: Intermittent compliance with HEP  Goal Met? Progressing    2. Patient will reduce max pain to 5/10 in order to perform iADLs and ADLs more comfortably  Status at last Eval: 10/10 pain at the worst  Current Status: 5/10 pain at the worst   Goal Met?  yes    3. Patient will improve quad and hip strength to 4+/5 for prolonged periods of sitting and standing. Status at last Eval: L quad strength = 4/5, L glute med strength 4/5   Current Status: L quad strength = 4/5, L glute med strength 3+/5   Goal Met?  no    4. Patient will improve knee ROM to 0-125 degrees for improved functional mobility. Status at last Eval: L Knee AROM = 0 to 120 deg   Current Status: L Knee AROM = +3 to 122 deg   Goal Met? Progressing   Key Functional Changes/Progress: Pt has had 4 visits since initial evaluation on 3/28/23 due to waiting period required for insurance verification, thus progress has been slow. Currently, the patient's main complaint is L knee weakness. Average left knee pain is rated at 0/10 and 5/10 at the worst. Left knee AROM is +3 to 122 deg.  L knee strength is as follows:

## 2023-05-01 ENCOUNTER — TELEPHONE (OUTPATIENT)
Facility: HOSPITAL | Age: 38
End: 2023-05-01

## 2023-05-01 ENCOUNTER — APPOINTMENT (OUTPATIENT)
Facility: HOSPITAL | Age: 38
End: 2023-05-01
Payer: COMMERCIAL

## 2023-05-09 ENCOUNTER — HOSPITAL ENCOUNTER (OUTPATIENT)
Facility: HOSPITAL | Age: 38
Setting detail: RECURRING SERIES
Discharge: HOME | End: 2023-05-12
Payer: COMMERCIAL

## 2023-05-09 PROCEDURE — 97530 THERAPEUTIC ACTIVITIES: CPT

## 2023-05-09 PROCEDURE — 97110 THERAPEUTIC EXERCISES: CPT

## 2023-05-09 NOTE — PROGRESS NOTES
this certification period include and are to be achieved in   3-4  weeks:  Patient will reduce max pain to 2/10 in order to perform weightlifting. Patient will improve quad and hip strength to 5/5 in order to return to weightlifting. Progressing 05/09 indicated by ability to complete new strengthening exercises  Patient will improve knee ROM to 0-130 degrees for improved functional mobility. Patient will improve FOTO score to 83 for improved functional mobility and to return to PLOF.          PLAN  Yes  Continue plan of care  [x]  Upgrade activities as tolerated  []  Discharge due to :  []  Other:     Kyra Tavares, PTA    5/9/2023    12:35 PM    Future Appointments   Date Time Provider Prerna Aguilar   5/11/2023 10:20 AM Kyra Tavares PTA [id number]   5/15/2023 10:20 AM Kyratravon Tavares, PTA [address]   5/18/2023  9:00 AM Kyradeana Tavares, PTA [address]   5/22/2023  9:00 AM Kyratravon Tavares, PTA [address]   5/24/2023  9:00 AM Kyra Genera,  [facility]   6/1/2023  9:00 AM ZANDRA Tatum [facility]

## 2023-05-11 ENCOUNTER — HOSPITAL ENCOUNTER (OUTPATIENT)
Facility: HOSPITAL | Age: 38
Setting detail: RECURRING SERIES
Discharge: HOME | End: 2023-05-14
Payer: COMMERCIAL

## 2023-05-11 PROCEDURE — 97530 THERAPEUTIC ACTIVITIES: CPT

## 2023-05-11 PROCEDURE — 97112 NEUROMUSCULAR REEDUCATION: CPT

## 2023-05-11 PROCEDURE — 97110 THERAPEUTIC EXERCISES: CPT

## 2023-05-15 ENCOUNTER — HOSPITAL ENCOUNTER (OUTPATIENT)
Facility: HOSPITAL | Age: 38
Setting detail: RECURRING SERIES
Discharge: HOME | End: 2023-05-18
Payer: COMMERCIAL

## 2023-05-15 PROCEDURE — 97530 THERAPEUTIC ACTIVITIES: CPT

## 2023-05-15 PROCEDURE — 97112 NEUROMUSCULAR REEDUCATION: CPT

## 2023-05-15 PROCEDURE — 97110 THERAPEUTIC EXERCISES: CPT

## 2023-05-15 NOTE — PROGRESS NOTES
PHYSICAL / OCCUPATIONAL THERAPY - DAILY TREATMENT NOTE (updated )  For Eval visit    Patient Name: Yousif Stewart    Date: 5/15/2023    : 1985  Insurance: Payor: [facility] [address] / Plan: [facility] [address] / Product Type: *No Product type* /      Patient  verified yes     Visit #   Current / Total 7 18-20   Time   In / Out 1024 AM 1116 AM   Pain   In / Out 0/10 0/10   Subjective Functional Status/Changes: Patient reports doing a lot of walking over the weekend so her knee was feeling a \"dull ache\" throughout the weekend. Denied using modalities to address symptom but used some OTC medication. Changes to:  Meds, Allergies, Med Hx, Sx Hx? If yes, update Summary List no       TREATMENT AREA =  Left knee pain    OBJECTIVE    Modalities Rationale:     decrease pain to improve patient's ability to progress to PLOF and address remaining functional goals. min [] Estim Unattended, type/location:                                      []  w/ice    []  w/heat    min [] Estim Attended, type/location:                                     []  w/US     []  w/ice    []  w/heat    []  TENS insruct      min []  Mechanical Traction: type/lbs                   []  pro   []  sup   []  int   []  cont    []  before manual    []  after manual    min []  Ultrasound, settings/location:      min []  Iontophoresis w/ dexamethasone, location:                                               []  take home patch       []  in clinic   10 min  unbill [x]  Ice     []  Heat    location/position: To LEFT knee in semi recline post-session     min []  Paraffin,  details:     min []  Vasopneumatic Device, press/temp:     min []  Michell Bernal / Christina Orion:     If using vaso (only need to measure limb vaso being performed on)      pre-treatment girth :       post-treatment girth :       measured at (landmark location) :      min []  Other:    Skin assessment post-treatment (if applicable):    [x]  intact    [x]  redness- no adverse

## 2023-05-18 ENCOUNTER — APPOINTMENT (OUTPATIENT)
Facility: HOSPITAL | Age: 38
End: 2023-05-18
Payer: COMMERCIAL

## 2023-05-18 ENCOUNTER — TELEPHONE (OUTPATIENT)
Facility: HOSPITAL | Age: 38
End: 2023-05-18

## 2023-05-22 ENCOUNTER — HOSPITAL ENCOUNTER (OUTPATIENT)
Facility: HOSPITAL | Age: 38
Setting detail: RECURRING SERIES
Discharge: HOME | End: 2023-05-25
Payer: COMMERCIAL

## 2023-05-22 PROCEDURE — 97112 NEUROMUSCULAR REEDUCATION: CPT

## 2023-05-22 PROCEDURE — 97530 THERAPEUTIC ACTIVITIES: CPT

## 2023-05-22 NOTE — PROGRESS NOTES
(if diff from Tx Min) Procedure, Rationale, Specifics   Total  34 Total   [facility] Totals Reminder: bill using total billable min of TIMED therapeutic procedures (example: do not include dry needle or estim unattended, both untimed codes, in totals to left)  8-22 min = 1 unit; 23-37 min = 2 units; 38-52 min = 3 units; 53-67 min = 4 units; 68-82 min = 5 units     35740 Therapeutic Exercise (timed):  increase ROM, strength, coordination, balance, and proprioception to improve patient's ability to progress to PLOF and address remaining functional goals. (see flow sheet as applicable)     Details if applicable:       14  25771 Neuromuscular Re-Education (timed):  improve balance, coordination, kinesthetic sense, posture, core stability and proprioception to improve patient's ability to develop conscious control of individual muscles and awareness of position of extremities in order to progress to PLOF and address remaining functional goals. (see flow sheet as applicable)     Details if applicable:     20  93003 Therapeutic Activity (timed):  use of dynamic activities replicating functional movements to increase ROM, strength, coordination, balance, and proprioception in order to improve patient's ability to progress to PLOF and address remaining functional goals. (see flow sheet as applicable)     Details if applicable:       15688 Self Care/Home Management (timed):  improve patient knowledge and understanding of pain reducing techniques, positioning, posture/ergonomics, home safety, activity modification, diagnosis/prognosis, and physical therapy expectations, procedures and progression  to improve patient's ability to progress to PLOF and address remaining functional goals. (see flow sheet as applicable)     Details if applicable:    Reviewed current diagnosis, POC, and PT interventions. Issued and reviewed initial HEP. Discussed activity pacing (ie: recreational walking activities).            Details if applicable:

## 2023-05-23 NOTE — THERAPY RECERTIFICATION
[address], [facility], [facility] - Phone: [phone]  Fax: [phone]  [address] THERAPY          Patient Name: Rajwinder Pantoja : 1985   Treatment/Medical Diagnosis: Pain in left knee [M25.562]   Onset Date: 23    Referral Source: Shanice Ybarra* Start of Care [facility]): 23   Prior Hospitalization: See Medical History Provider #: [id number]   Prior Level of Function: Ind and pain free with ADLs and activities of leisure   Comorbidities: Depression. Alcohol Use. Medications: Verified on Patient Summary List   Visits from [facility]: 9 Missed Visits: 2     Progress to Goals:     Patient will reduce max pain to 2/10 in order to perform weightlifting. Status at last Eval: 5/10  Current Status: 4/10  Goal Met?  progressing    2. Patient will improve quad and hip strength to 5/5 in order to return to weightlifting. Status at last Eval: quad 4/5, glute med 3+/5  Current Status: quad 4+/5, glute med 3+/5  Goal Met?  progressing    3. Patient will improve knee ROM to 0-130 degrees for improved functional mobility. Status at last Eval: +3 to 122 deg  Current Status: 0-122 deg   Goal Met? Progressing; goal modified to ROM to 0-125 (comparable to RIGHT knee)    Key Functional Changes/Progress: Ms. Mahesh Mendoza has made steady progress thus far. Patient has been seen for 4 visits since last PN/RC 23. In the last 2 weeks, pain has ranged between 0-4/10, noting an elevation in pain levels with prolonged activity (noticeable towards the end of the day). However pt describes pain more as soreness and aching vs sharp pain/red flags. Patient reports 80-90% overall improvement since beginning PT. Notices improvement with her LE mobility/flexibility, her ability for stair/curb negotiation, and improved confidence with bending activities.  Current FOTO 67/100 (no change) and +5 on GROC

## 2023-05-24 ENCOUNTER — HOSPITAL ENCOUNTER (OUTPATIENT)
Facility: HOSPITAL | Age: 38
Setting detail: RECURRING SERIES
Discharge: HOME | End: 2023-05-27
Payer: COMMERCIAL

## 2023-05-24 PROCEDURE — 97110 THERAPEUTIC EXERCISES: CPT

## 2023-05-24 PROCEDURE — 97112 NEUROMUSCULAR REEDUCATION: CPT

## 2023-05-24 PROCEDURE — 97530 THERAPEUTIC ACTIVITIES: CPT

## 2023-05-24 NOTE — PROGRESS NOTES
activities as tolerated  []  Discharge due to :  [x]  Other: continue 2x/week for 4 weeks     Adarsh Lobato, PTA    5/24/2023    9:54 AM    Future Appointments   Date Time Provider Prerna Aguilar   6/2/2023  7:40 AM Nasim Watson, PT [facility]   6/5/2023 10:20 AM Adarsh Lobato, ABDOUL [facility]   6/7/2023 10:20 AM Adarsh Lobato, PTA [id number]   6/13/2023 10:20 AM Adarsh Lobato, PTA [id number]   6/15/2023 10:20 AM Adarsh Lobato, PTA [id number]

## 2023-06-01 ENCOUNTER — APPOINTMENT (OUTPATIENT)
Facility: HOSPITAL | Age: 38
End: 2023-06-01
Payer: COMMERCIAL

## 2023-06-05 ENCOUNTER — HOSPITAL ENCOUNTER (OUTPATIENT)
Facility: HOSPITAL | Age: 38
Setting detail: RECURRING SERIES
Discharge: HOME | End: 2023-06-08
Payer: COMMERCIAL

## 2023-06-05 PROCEDURE — 97530 THERAPEUTIC ACTIVITIES: CPT

## 2023-06-05 PROCEDURE — 97110 THERAPEUTIC EXERCISES: CPT

## 2023-06-05 PROCEDURE — 97112 NEUROMUSCULAR REEDUCATION: CPT

## 2023-06-05 NOTE — PROGRESS NOTES
05/24/23  Goals for this certification period include and are to be achieved in   4  weeks:  1. Patient will reduce max pain to 2/10 in order to perform weightlifting. Status at last Eval: 4/10  Current Status: -  Goal Met?  -    2. Patient will improve quad and hip strength to 5/5 in order to return to weightlifting. Status at last Eval: quad 4+/5, glute med 3+/5  Current Status: -progressing 06/05/23 indicated by ability to complete strengthening exercises. Goal Met?  -    3. Patient will improve knee ROM to 0-125 degrees for improved functional mobility.   Status at last Eval:  0-122 deg   Current Status: -  Goal Met?  -    PLAN  Yes  Continue plan of care  [x]  Upgrade activities as tolerated  []  Discharge due to :  []  Other:     Garett Cope PTA    6/5/2023    8:58 AM    Future Appointments   Date Time Provider Prerna Aguilar   6/5/2023 10:20 AM Garett Cope PTA [facility]   6/13/2023 10:20 AM Garett Cope PTA [id number]   6/15/2023 10:20 AM Garett Cope PTA [address]   6/19/2023 11:00 AM Garett Cope PTA [address]

## 2023-06-07 ENCOUNTER — APPOINTMENT (OUTPATIENT)
Facility: HOSPITAL | Age: 38
End: 2023-06-07
Payer: COMMERCIAL

## 2023-06-15 ENCOUNTER — HOSPITAL ENCOUNTER (OUTPATIENT)
Facility: HOSPITAL | Age: 38
Setting detail: RECURRING SERIES
Discharge: HOME | End: 2023-06-18
Payer: COMMERCIAL

## 2023-06-15 PROCEDURE — G0283 ELEC STIM OTHER THAN WOUND: HCPCS

## 2023-06-15 PROCEDURE — 97530 THERAPEUTIC ACTIVITIES: CPT

## 2023-06-15 PROCEDURE — 97110 THERAPEUTIC EXERCISES: CPT

## 2023-06-30 ENCOUNTER — HOSPITAL ENCOUNTER (OUTPATIENT)
Facility: HOSPITAL | Age: 38
Setting detail: RECURRING SERIES
End: 2023-06-30
Payer: COMMERCIAL

## 2023-06-30 PROCEDURE — 97110 THERAPEUTIC EXERCISES: CPT

## 2023-06-30 PROCEDURE — 97535 SELF CARE MNGMENT TRAINING: CPT

## 2023-12-05 PROBLEM — Z51.89 ENCOUNTER FOR BLOOD TRANSFUSION: Status: ACTIVE | Noted: 2023-12-05

## 2023-12-05 PROBLEM — D50.9 IRON DEFICIENCY ANEMIA: Status: ACTIVE | Noted: 2023-12-05

## 2023-12-05 PROBLEM — R42 LIGHTHEADEDNESS: Status: ACTIVE | Noted: 2023-12-05

## 2023-12-08 PROBLEM — E55.9 VITAMIN D DEFICIENCY: Status: ACTIVE | Noted: 2019-02-01

## 2023-12-08 PROBLEM — E55.9 VITAMIN D DEFICIENCY: Status: RESOLVED | Noted: 2019-02-01 | Resolved: 2023-12-08

## 2023-12-08 PROBLEM — U07.1 COVID-19 VIRUS INFECTION: Status: ACTIVE | Noted: 2023-12-08

## 2023-12-08 PROBLEM — E66.01 MORBID OBESITY: Status: ACTIVE | Noted: 2019-02-01

## 2023-12-08 PROBLEM — N92.0 EXCESSIVE AND FREQUENT MENSTRUATION: Status: ACTIVE | Noted: 2020-01-16

## 2023-12-08 PROBLEM — S83.289A TEAR OF LATERAL MENISCUS OF KNEE: Status: RESOLVED | Noted: 2023-03-09 | Resolved: 2023-12-08

## 2023-12-08 PROBLEM — U07.1 COVID-19 VIRUS INFECTION: Status: RESOLVED | Noted: 2023-12-08 | Resolved: 2023-12-08

## 2023-12-08 PROBLEM — S83.289A TEAR OF LATERAL MENISCUS OF KNEE: Status: ACTIVE | Noted: 2023-03-09

## 2024-10-14 ENCOUNTER — TELEPHONE (OUTPATIENT)
Facility: HOSPITAL | Age: 39
End: 2024-10-14

## 2024-10-16 ENCOUNTER — HOSPITAL ENCOUNTER (OUTPATIENT)
Facility: HOSPITAL | Age: 39
Setting detail: RECURRING SERIES
Discharge: HOME | End: 2024-10-19
Payer: COMMERCIAL

## 2024-10-16 PROCEDURE — 97161 PT EVAL LOW COMPLEX 20 MIN: CPT

## 2024-10-16 NOTE — PROGRESS NOTES
PHYSICAL / OCCUPATIONAL THERAPY - DAILY TREATMENT NOTE (updated )  For Eval visit    Patient Name: Lauren Summers    Date: 10/17/2024    : 1985  Insurance: Payor: BCBS / Plan: BCBS OUT OF STATE / Product Type: *No Product type* /      Patient  verified yes     Visit #   Current / Total 1 16   Time   In / Out 3:12 3:52   Pain   In / Out 4/10 4/10   Subjective Functional Status/Changes: See POC     TREATMENT AREA =  Pain in left knee [M25.562]    OBJECTIVE           35 min   Eval - untimed                      Therapeutic Procedures:  Tx Min Billable or 1:1 Min (if diff from Tx Min) Procedure, Rationale, Specifics   5   25073 Self Care/Home Management (timed):  improve patient knowledge and understanding of pain reducing techniques, positioning, posture/ergonomics, home safety, activity modification, diagnosis/prognosis, and physical therapy expectations, procedures and progression  to improve patient's ability to progress to PLOF and address remaining functional goals.  (see flow sheet as applicable)     Details if applicable:    Reviewed diagnosis, prognosis, therapy progression   Reviewed and educated on HEP   Edu on ice use, elevating, brace use during more ambulatory or busy times          Details if applicable:      []   assessing strength/ROM  []   assessing activity performance (ex: sit to stand, stair negotiation)  []   assessing balance/control (ex. Wheatley, DGI, SLS)          Details if applicable:      []   assessing strength/ROM  []   assessing activity performance (ex: sit to stand, stair negotiation)  []   assessing balance/control (ex. Wheatley, DGI, SLS)          Details if applicable:      []   assessing strength/ROM  []   assessing activity performance (ex: sit to stand, stair negotiation)  []   assessing balance/control (ex. Wheatley, DGI, SLS)          Details if applicable:       5  [facility] Totals Reminder: bill using total billable min of TIMED therapeutic procedures (example: do not include

## 2024-10-16 NOTE — THERAPY EVALUATION
Left Right Left Right Left Right   Flexion - - - - - -   Extension - - - - 4-/5 p! -   Abduction - - - - 3+/5 -   Adduction - - - - - -   ER - - - - 4/5 p! Knee  -   IR - - - - 4+/5 -   Knee Left Right Left Right Left Right   Extension 3 deg hyper with retro PFJ pain  5 deg hyper - - 4+/5 5/5   Flexion 123 tight medial knee 125 - - 4/5 4+/5      Special Test:  Thessaly - L (+)   You - L (-)   Valgus stress test - 0 deg (+) laxity and pain, 30 deg (+) laxity    Palpation:   TTP medial joint line and MCL     Pt presents with s/sxs consistent with possible recurrent MCL sprain and/or medial meniscus lesion causing L knee pain. Pt would benefit from skilled PT services addressing the current ROM, strength, functional performance, and balance impairments so that the patient to return to performing all ADLs with minimal restriction/limitation or without any functional limitations.     HEP provided to the patient in order to promote improvement and self management of symptoms and functional performance     Not post operative    Evaluation Complexity:  History:  LOW Complexity : Zero comorbidities / personal factors that will impact the outcome / POC; Examination:  MEDIUM Complexity : 3 Standardized tests and measures addressin body structure, function, activity limitation and / or participation in recreation  ;Presentation:  MEDIUM Complexity : Evolving with changing characteristics  ;Clinical Decision Making:  Lower Extremity Functional Scale (LEFS) = 56/80 ; (40-60 Mild to Moderate Dysfunction) = MODERATE Complexity   Overall Complexity Rating: LOW   Problem List: pain affecting function, decrease ROM, decrease strength, impaired gait/balance, decrease ADL/functional abilities, decrease activity tolerance, and decrease flexibility/joint mobility   Treatment Plan may include any combination of the followin Therapeutic Exercise, 38611 Neuromuscular Re-Education, 13143 Manual Therapy, 53119 Therapeutic

## 2024-10-23 ENCOUNTER — HOSPITAL ENCOUNTER (OUTPATIENT)
Facility: HOSPITAL | Age: 39
Setting detail: RECURRING SERIES
Discharge: HOME | End: 2024-10-26
Payer: COMMERCIAL

## 2024-10-23 PROCEDURE — 97530 THERAPEUTIC ACTIVITIES: CPT

## 2024-10-23 PROCEDURE — 97110 THERAPEUTIC EXERCISES: CPT

## 2024-10-23 PROCEDURE — 97112 NEUROMUSCULAR REEDUCATION: CPT

## 2024-10-23 NOTE — PROGRESS NOTES
units; 38-52 min = 3 units; 53-67 min = 4 units; 68-82 min = 5 units   15  68113 Therapeutic Exercise (timed):  increase ROM, strength, coordination, balance, and proprioception to improve patient's ability to progress to PLOF and address remaining functional goals. (see flow sheet as applicable)    Details if applicable     15  86657 Therapeutic Activity (timed):  use of dynamic activities replicating functional movements to increase ROM, strength, coordination, balance, and proprioception in order to improve patient's ability to progress to PLOF and address remaining functional goals.  (see flow sheet as applicable)    Details if applicable:       11  68304 Neuromuscular Re-Education (timed):  improve balance, coordination, kinesthetic sense, posture, core stability and proprioception to improve patient's ability to develop conscious control of individual muscles and awareness of position of extremities in order to progress to PLOF and address remaining functional goals. (see flow sheet as applicable)    Details if applicable:           [x]  Patient Education billed concurrently with other procedures   [x] Review HEP    [] Progressed/Changed HEP, detail: see self care for any notable changes   [] Other detail: see self care for any notable changes    [employer] Access Code Date Issued   [email or web address] 10/16/24     Objective Information/Functional Measures/Assessment     Subjective taken. Initiated exercises & drills to improve LE strength & stability. Cues for form for all exercises due to 1st follow up and avoid compensation.     Concluded session w/ CP to L knee. Encouraged pt to complete established HEP and No adverse effects following PT session.      Patient will continue to benefit from skilled PT / OT services to modify and progress therapeutic interventions, analyze and address functional mobility deficits, analyze and address ROM deficits, analyze and address strength deficits, analyze and address soft tissue

## 2024-10-30 ENCOUNTER — HOSPITAL ENCOUNTER (OUTPATIENT)
Facility: HOSPITAL | Age: 39
Setting detail: RECURRING SERIES
Discharge: HOME | End: 2024-11-02
Payer: COMMERCIAL

## 2024-10-30 PROCEDURE — 97112 NEUROMUSCULAR REEDUCATION: CPT

## 2024-10-30 PROCEDURE — 97530 THERAPEUTIC ACTIVITIES: CPT

## 2024-10-30 PROCEDURE — 97110 THERAPEUTIC EXERCISES: CPT

## 2024-10-30 NOTE — PROGRESS NOTES
min = 4 units; 68-82 min = 5 units   15  19515 Therapeutic Exercise (timed):  increase ROM, strength, coordination, balance, and proprioception to improve patient's ability to progress to PLOF and address remaining functional goals. (see flow sheet as applicable)    Details if applicable     22  18076 Therapeutic Activity (timed):  use of dynamic activities replicating functional movements to increase ROM, strength, coordination, balance, and proprioception in order to improve patient's ability to progress to PLOF and address remaining functional goals.  (see flow sheet as applicable)    Details if applicable:       10  60366 Neuromuscular Re-Education (timed):  improve balance, coordination, kinesthetic sense, posture, core stability and proprioception to improve patient's ability to develop conscious control of individual muscles and awareness of position of extremities in order to progress to PLOF and address remaining functional goals. (see flow sheet as applicable)    Details if applicable:           [x]  Patient Education billed concurrently with other procedures   [x] Review HEP    [] Progressed/Changed HEP, detail: see self care for any notable changes   [] Other detail: see self care for any notable changes    [employer] ([employer]) Access Code Date Issued   [email or web address] 10/16/24     Objective Information/Functional Measures/Assessment     Arrived donning OTC velcro knee stability brace.     Began w/ established strengthening interventions on plinth  f/b progression of LE strength dynamically.   ADDED: monster walks (fwd, retro, lateral) w/ resistance at ankles  INCREASE: reps w/ SL clams, reps w/ LAQ, reps w/ HS curls   Relaxed ankle during prone quad stretch inc medial joint line pain, no pain w/ ankle DF. Noted inc medial joint line pain with last exercise: TKE w/ TB. Attempted w/ ball instead however continued to note pain. Began noting pain w/ last set of F/B monster walks. Will change band placement NV from ankles to

## 2024-11-05 ENCOUNTER — HOSPITAL ENCOUNTER (OUTPATIENT)
Facility: HOSPITAL | Age: 39
Setting detail: RECURRING SERIES
Discharge: HOME | End: 2024-11-08
Payer: COMMERCIAL

## 2024-11-05 PROCEDURE — 97110 THERAPEUTIC EXERCISES: CPT

## 2024-11-05 PROCEDURE — 97112 NEUROMUSCULAR REEDUCATION: CPT

## 2024-11-05 PROCEDURE — 97530 THERAPEUTIC ACTIVITIES: CPT

## 2024-11-05 NOTE — PROGRESS NOTES
PHYSICAL / OCCUPATIONAL THERAPY - DAILY TREATMENT NOTE    Patient Name: Lauren Summers    Date: 2024    :   Insurance: Payor: BCBS / Plan: BCBS OUT OF STATE / Product Type: *No Product type* /      Patient  verified Yes     Visit #   Current / Total 3 16   Time   In / Out 7:00 7:50   Pain   In / Out 2/10 0/10   Subjective Functional Status/Changes:   Was very active this weekend, went to a retreat and walked a lot, up and down stairs. Will get sore and has to stop and sit, then will get sore after.     Relays that sitting bothers the knee and it's stiff to get up. So does standing or walking for duration.      TREATMENT AREA =  Pain in left knee [M25.562]       OBJECTIVE    Modalities Rationale:     decrease pain to improve patient's ability to progress to PLOF and address remaining functional goals.     min [] Estim Unattended, type/location:                                      []  w/ice    []  w/heat    min [] Estim Attended, type/location:                                     []  w/US     []  w/ice    []  w/heat    []  TENS insruct      min []  Mechanical Traction: type/lbs                   []  pro   []  sup   []  int   []  cont    []  before manual    []  after manual    min []  Ultrasound, settings/location:     10 min  unbill [x]  Ice     []  Heat    location/position: To L knee in supine w/ BLE on wedge post-session    min []  Paraffin,  details:     min []  Vasopneumatic Device, press/temp:     min []  Whirlpool / Fluido:    If using vaso (only need to measure limb vaso being performed on)      pre-treatment girth :       post-treatment girth :       measured at (landmark location) :      min []  Other:    Skin assessment post-treatment:   Intact      Therapeutic Procedures:  Tx Min Billable or 1:1 Min (if diff from Tx Min) Procedure, Rationale, Specifics   Total  40 Total   [facility] Totals Reminder: bill using total billable min of TIMED therapeutic procedures (example: do not include dry

## 2024-11-07 ENCOUNTER — HOSPITAL ENCOUNTER (OUTPATIENT)
Facility: HOSPITAL | Age: 39
Setting detail: RECURRING SERIES
Discharge: HOME | End: 2024-11-10
Payer: COMMERCIAL

## 2024-11-07 PROCEDURE — 97112 NEUROMUSCULAR REEDUCATION: CPT

## 2024-11-07 PROCEDURE — 97110 THERAPEUTIC EXERCISES: CPT

## 2024-11-07 PROCEDURE — 97530 THERAPEUTIC ACTIVITIES: CPT

## 2024-11-07 NOTE — PROGRESS NOTES
PHYSICAL / OCCUPATIONAL THERAPY - DAILY TREATMENT NOTE (updated )    Patient Name: Lauren Summers    Date: 2024    : 1985  Insurance: Payor: BCBS / Plan: BCBS OUT OF STATE / Product Type: *No Product type* /      Patient  verified yes     Visit #   Current / Total 5 7   Time   In / Out 7:00 am 7:40 am   Pain   In / Out 0 5   Subjective Functional Status/Changes: Patient reports her pain was 0/10 this morning but with standing and being up on her feet the pain is creeping up.         TREATMENT AREA =  Pain in left knee [M25.562]    OBJECTIVE    Therapeutic Procedures:  Tx Min Billable or 1:1 Min (if diff from Tx Min) Procedure, Rationale, Specifics   15  37922 Therapeutic Exercise (timed):  increase ROM, strength, coordination, balance, and proprioception to improve patient's ability to progress to PLOF and address remaining functional goals. (see flow sheet as applicable)     Details if applicable:       15  34647 Neuromuscular Re-Education (timed):  improve balance, coordination, kinesthetic sense, posture, core stability and proprioception to improve patient's ability to develop conscious control of individual muscles and awareness of position of extremities in order to progress to PLOF and address remaining functional goals. (see flow sheet as applicable)     Details if applicable:     10  63075 Therapeutic Activity (timed):  use of dynamic activities replicating functional movements to increase ROM, strength, coordination, balance, and proprioception in order to improve patient's ability to progress to PLOF and address remaining functional goals.  (see flow sheet as applicable)     Details if applicable:     40  [facility] Totals Reminder: bill using total billable min of TIMED therapeutic procedures (example: do not include dry needle or estim unattended, both untimed codes, in totals to left)  8-22 min = 1 unit; 23-37 min = 2 units; 38-52 min = 3 units; 53-67 min = 4 units; 68-82 min = 5 units

## 2024-11-13 ENCOUNTER — HOSPITAL ENCOUNTER (OUTPATIENT)
Facility: HOSPITAL | Age: 39
Setting detail: RECURRING SERIES
Discharge: HOME | End: 2024-11-16
Payer: COMMERCIAL

## 2024-11-13 PROCEDURE — 97530 THERAPEUTIC ACTIVITIES: CPT

## 2024-11-13 PROCEDURE — 97110 THERAPEUTIC EXERCISES: CPT

## 2024-11-13 PROCEDURE — 97112 NEUROMUSCULAR REEDUCATION: CPT

## 2024-11-13 NOTE — PROGRESS NOTES
units; 68-82 min = 5 units   20  78256 Therapeutic Exercise (timed):  increase ROM, strength, coordination, balance, and proprioception to improve patient's ability to progress to PLOF and address remaining functional goals. (see flow sheet as applicable)    Details if applicable  ROM and strengthe reassessment   10  06189 Therapeutic Activity (timed):  use of dynamic activities replicating functional movements to increase ROM, strength, coordination, balance, and proprioception in order to improve patient's ability to progress to PLOF and address remaining functional goals.  (see flow sheet as applicable)    Details if applicable:       10  68249 Neuromuscular Re-Education (timed):  improve balance, coordination, kinesthetic sense, posture, core stability and proprioception to improve patient's ability to develop conscious control of individual muscles and awareness of position of extremities in order to progress to PLOF and address remaining functional goals. (see flow sheet as applicable)    Details if applicable:           [x]  Patient Education billed concurrently with other procedures   [x] Review HEP    [] Progressed/Changed HEP, detail: see self care for any notable changes   [] Other detail: see self care for any notable changes    [employer] Access Code Date Issued   [email or web address] 10/16/24     Objective Information/Functional Measures/Assessment     Pt reports 50% overall improvement since beginning PT for the L knee. Notices improvement with stair negotiation and increase standing/walking tolerance. Able to stand and walk for 1 hr now, was only able to tolerate 20-30 minutes. In the last 2 weeks, pain has ranged between 0-10/10. Max pain occurred 1x due to \"doing too much that day\". Daily pain ranges between 0-3/10. Elevation in pain level for standing/walking for >1 hr and stand after sitting for 1 hr.     LEFS 58/80 (last PN: 56/80)  0-20: Severe functional limitation   21-40: Moderate functional limitation

## 2024-11-13 NOTE — THERAPY RECERTIFICATION
[facility]  [address],[place] [address] - Ph: [phone]  Fx: [phone]  PHYSICAL THERAPY PROGRESS NOTE  Patient Name: Lauren Summers : 1985   Treatment/Medical Diagnosis: Pain in left knee [M25.562]   Referral Source: Sharad Padron*     Date of Initial Visit: 10/16/24 Attended Visits: 6 Missed Visits: CX 0  NS 0     SUMMARY OF TREATMENT  Patient has been evaluated and assessed for Pain in left knee [M25.562]. Pt has attended 5 follow up sessions since initial eval,   PT interventions have included therapeutic exercises, neuromuscular re-ed, therapeutic functional activity, and patient education on self-care strategies. HEP provided to maximize gains made from PT. CP for pain control.    CURRENT STATUS  Pt has made steady progress with PT interventions thus far indicated by improved activity tolerance and improved functional scores. Pt reports 50% overall improvement since beginning PT for the L knee. Notices improvement with stair negotiation and increase standing/walking tolerance. Able to stand and walk for 1 hr now, was only able to tolerate 20-30 minutes. In the last 2 weeks, pain has ranged between 0-10/10. Max pain occurred 1x due to \"doing too much that day\". Daily pain ranges between 0-3/10. Elevation in pain level for standing/walking for >1 hr and stand after sitting for 1 hr.        Current objective findings:   LEFS 58/80 (last PN: 56/80)  0-20: Severe functional limitation   21-40: Moderate functional limitation   41-60: Mild to moderate functional limitation   61-80: Minimal functional limitation or normal function  GROC: +4 (moderately better)    PROGRESS TOWARDS PT GOALS:  Short Term Goals: To be accomplished in 4 weeks  1) Pt will be IND with HEP to facilitate self care management.   EVAL: Provided HEP  Current Status: I and compliant  Goal Met?  YES     2) Pt will be able to go from sitting to

## 2024-11-19 ENCOUNTER — TELEPHONE (OUTPATIENT)
Facility: HOSPITAL | Age: 39
End: 2024-11-19

## 2024-11-19 NOTE — TELEPHONE ENCOUNTER
ANSWERED - SPOKE WITH PATIENT RE: MISSED APPOINTMENT THIS MORNING. PATIENT FORGOT ABOUT APPOINTMENT AND WAS VERY APOLOGETIC. OFFERED SAME DAY APPT JUST LATER BUT DID NOT WORK WITH PATIENT'S WORK SCHEDULE. CONFIRMED NEXT APPOINTMENT ON THURS 11/21/24.

## 2024-11-21 ENCOUNTER — HOSPITAL ENCOUNTER (OUTPATIENT)
Facility: HOSPITAL | Age: 39
Setting detail: RECURRING SERIES
Discharge: HOME | End: 2024-11-24
Payer: COMMERCIAL

## 2024-11-21 ENCOUNTER — APPOINTMENT (OUTPATIENT)
Facility: HOSPITAL | Age: 39
End: 2024-11-21
Payer: COMMERCIAL

## 2024-11-21 PROCEDURE — 97110 THERAPEUTIC EXERCISES: CPT

## 2024-11-21 PROCEDURE — 97530 THERAPEUTIC ACTIVITIES: CPT

## 2024-11-21 PROCEDURE — 97112 NEUROMUSCULAR REEDUCATION: CPT

## 2024-11-21 NOTE — PROGRESS NOTES
PHYSICAL / OCCUPATIONAL THERAPY - DAILY TREATMENT NOTE (updated )    Patient Name: Lauren Summers    Date: 2024    : 1985  Insurance: Payor: BCBS / Plan: BCBS OUT OF STATE / Product Type: *No Product type* /      Patient  verified yes     Visit #   Current / Total 7 7   Time   In / Out 700 am 750 am   Pain   In / Out 4 4   Subjective Functional Status/Changes: Patient reports she shifted on her knee when she was at her car in the parking lot and she felt her knee give way and it felt very unstable. She gave her a few minutes to collect herself and it is just sore, denies pain.         TREATMENT AREA =  Pain in left knee [M25.562]    OBJECTIVE    Modalities Rationale:     decrease inflammation and decrease pain to improve patient's ability to progress to PLOF and address remaining functional goals.     min [] Estim Unattended, type/location:                                      []  w/ice    []  w/heat    min [] Estim Attended, type/location:                                     []  w/US     []  w/ice    []  w/heat    []  TENS insruct      min []  Mechanical Traction: type/lbs                   []  pro   []  sup   []  int   []  cont    []  before manual    []  after manual    min []  Ultrasound, settings/location:                                                []  take home patch       []  in clinic   10 min  unbilled [x]  Ice     []  Heat    location/position: Left knee ant/post; longsitting    min []  Paraffin,  details:     min []  Vasopneumatic Device, press/temp:     min []  Whirlpool / Fluido:    If using vaso (only need to measure limb vaso being performed on)      pre-treatment girth :       post-treatment girth :       measured at (landmark location) :      min []  Other:    Skin assessment post-treatment (if applicable):    [x]  intact    []  redness- no adverse reaction                 []redness - adverse reaction:        Therapeutic Procedures:  Tx Min Billable or 1:1 Min (if diff from

## 2024-11-27 ENCOUNTER — HOSPITAL ENCOUNTER (OUTPATIENT)
Facility: HOSPITAL | Age: 39
Setting detail: RECURRING SERIES
Discharge: HOME | End: 2024-11-30
Payer: COMMERCIAL

## 2024-11-27 ENCOUNTER — APPOINTMENT (OUTPATIENT)
Facility: HOSPITAL | Age: 39
End: 2024-11-27
Payer: COMMERCIAL

## 2024-11-27 PROCEDURE — 97535 SELF CARE MNGMENT TRAINING: CPT

## 2024-11-27 PROCEDURE — 97110 THERAPEUTIC EXERCISES: CPT

## 2024-11-27 PROCEDURE — 97112 NEUROMUSCULAR REEDUCATION: CPT

## 2024-11-27 PROCEDURE — 97530 THERAPEUTIC ACTIVITIES: CPT

## 2024-11-27 NOTE — PROGRESS NOTES
PHYSICAL / OCCUPATIONAL THERAPY - DAILY TREATMENT NOTE    Patient Name: Lauren Summers    Date: 2024    :   Insurance: Payor: BCBS / Plan: BCBS OUT OF STATE / Product Type: *No Product type* /      Patient  verified Yes     Visit #   Current / Total 8 16   Time   In / Out 222  PM   Pain   In / Out 0/10 0/10   Subjective Functional Status/Changes: Pt reports pulling her bed sheet over her R foot and twisted the knee 2 days ago. Reports pain was 8/10. Quick and sharp inside of the knee. Same symptoms when walking. Pt will be going to [employer] this evening for [employer]) festivities.   Patient denies falls or red flags since last visit.     TREATMENT AREA =  Pain in left knee [M25.562]       OBJECTIVE      Therapeutic Procedures:  Tx Min Billable or 1:1 Min (if diff from Tx Min) Procedure, Rationale, Specifics   Total  58 Total   [facility] Totals Reminder: bill using total billable min of TIMED therapeutic procedures (example: do not include dry needle or estim unattended, both untimed codes, in totals to left)  8-22 min = 1 unit; 23-37 min = 2 units; 38-52 min = 3 units; 53-67 min = 4 units; 68-82 min = 5 units   15  94855 Therapeutic Exercise (timed):  increase ROM, strength, coordination, balance, and proprioception to improve patient's ability to progress to PLOF and address remaining functional goals. (see flow sheet as applicable)    Details if applicable     20  45423 Therapeutic Activity (timed):  use of dynamic activities replicating functional movements to increase ROM, strength, coordination, balance, and proprioception in order to improve patient's ability to progress to PLOF and address remaining functional goals.  (see flow sheet as applicable)    Details if applicable:       15  27566 Neuromuscular Re-Education (timed):  improve balance, coordination, kinesthetic sense, posture, core stability and proprioception to improve patient's ability to develop conscious control of

## 2024-12-04 ENCOUNTER — HOSPITAL ENCOUNTER (OUTPATIENT)
Facility: HOSPITAL | Age: 39
Setting detail: RECURRING SERIES
End: 2024-12-04
Payer: COMMERCIAL

## 2024-12-05 ENCOUNTER — TELEPHONE (OUTPATIENT)
Facility: HOSPITAL | Age: 39
End: 2024-12-05

## 2024-12-11 ENCOUNTER — HOSPITAL ENCOUNTER (OUTPATIENT)
Facility: HOSPITAL | Age: 39
Setting detail: RECURRING SERIES
Discharge: HOME | End: 2024-12-14
Payer: COMMERCIAL

## 2024-12-11 PROCEDURE — 97530 THERAPEUTIC ACTIVITIES: CPT

## 2024-12-11 PROCEDURE — 97110 THERAPEUTIC EXERCISES: CPT

## 2024-12-11 PROCEDURE — 97112 NEUROMUSCULAR REEDUCATION: CPT

## 2024-12-11 NOTE — PROGRESS NOTES
PHYSICAL / OCCUPATIONAL THERAPY - DAILY TREATMENT NOTE    Patient Name: Lauren Summers    Date: 2024    :   Insurance: Payor: BCBS / Plan: BCBS OUT OF STATE / Product Type: *No Product type* /      Patient  verified Yes     Visit #   Current / Total 9 16   Time   In / Out 220  PM   Pain   In / Out 0/10 0/10   Subjective Functional Status/Changes: Pt reports having no issues over the Thanksgiving holiday. Hasn't been wearing the knee brace in a couple of weeks.  L knee MRI comopleted last night.  Patient denies falls or red flags since last visit.     TREATMENT AREA =  Pain in left knee [M25.562]       OBJECTIVE    Modalities Rationale:     decrease inflammation and decrease pain to improve patient's ability to progress to PLOF and address remaining functional goals.     min [] Estim Unattended, type/location:                                      []  w/ice    []  w/heat    min [] Estim Attended, type/location:                                     []  w/US     []  w/ice    []  w/heat    []  TENS insruct      min []  Mechanical Traction: type/lbs                   []  pro   []  sup   []  int   []  cont    []  before manual    []  after manual    min []  Ultrasound, settings/location:     10 min  unbill [x]  Ice     []  Heat    location/position: To L knee in longsit post-session     min []  Paraffin,  details:     min []  Vasopneumatic Device, press/temp:     min []  Whirlpool / Fluido:    If using vaso (only need to measure limb vaso being performed on)      pre-treatment girth :       post-treatment girth :       measured at (landmark location) :      min []  Other:    Skin assessment post-treatment:   Intact        Therapeutic Procedures:  Tx Min Billable or 1:1 Min (if diff from Tx Min) Procedure, Rationale, Specifics   Total  45 Total    BC Totals Reminder: bill using total billable min of TIMED therapeutic procedures (example: do not include dry needle or estim unattended, both untimed

## 2024-12-13 ENCOUNTER — HOSPITAL ENCOUNTER (OUTPATIENT)
Facility: HOSPITAL | Age: 39
Setting detail: RECURRING SERIES
End: 2024-12-13
Payer: COMMERCIAL

## 2024-12-17 ENCOUNTER — TELEPHONE (OUTPATIENT)
Facility: HOSPITAL | Age: 39
End: 2024-12-17

## 2024-12-17 NOTE — TELEPHONE ENCOUNTER
Left Message - LVM TO ALERT PATIENT OF PENDING AUTH STATUS AND CANCELLATION OF BOTH APPOINTMENTS THIS WEEK

## 2024-12-18 ENCOUNTER — HOSPITAL ENCOUNTER (OUTPATIENT)
Facility: HOSPITAL | Age: 39
Setting detail: RECURRING SERIES
End: 2024-12-18
Payer: COMMERCIAL

## 2024-12-20 ENCOUNTER — APPOINTMENT (OUTPATIENT)
Facility: HOSPITAL | Age: 39
End: 2024-12-20
Payer: COMMERCIAL

## 2024-12-31 ENCOUNTER — HOSPITAL ENCOUNTER (OUTPATIENT)
Facility: HOSPITAL | Age: 39
Setting detail: RECURRING SERIES
Discharge: HOME | End: 2025-01-03
Payer: COMMERCIAL

## 2024-12-31 PROCEDURE — 97112 NEUROMUSCULAR REEDUCATION: CPT

## 2024-12-31 PROCEDURE — 97530 THERAPEUTIC ACTIVITIES: CPT

## 2024-12-31 PROCEDURE — 97110 THERAPEUTIC EXERCISES: CPT

## 2024-12-31 PROCEDURE — 97535 SELF CARE MNGMENT TRAINING: CPT

## 2024-12-31 NOTE — THERAPY RECERTIFICATION
[facility] [place] [facility]  [address], [address],[place] [address] - Ph: [phone]  Fx: [phone]  PHYSICAL THERAPY PROGRESS NOTE  Patient Name: Lauren Summers : 1985   Treatment/Medical Diagnosis: Pain in left knee [M25.562]   Referral Source: Sharad Padron*     Date of Initial Visit: 10/16/24 Attended Visits: 10 Missed Visits: CX 0  NS 0     SUMMARY OF TREATMENT  Patient has been evaluated and assessed for Pain in left knee [M25.562]. Pt has attended 5 follow up sessions since initial eval,   PT interventions have included therapeutic exercises, neuromuscular re-ed, therapeutic functional activity, and patient education on self-care strategies. HEP provided to maximize gains made from PT. CP for pain control.    CURRENT STATUS  Pt has made steady progress thus far with PT interventions. Pt reports 85-90% overall improvement since beginning PT session. Notices an improvement with her walking tolerance without knee brace, standing tolerance, and some improvement with stair negotiation. In the last 2 weeks, pain has ranged between 0-4/10. Continues to report instability and elevation in pain with \"sudden, quick pivoting\" movements and prolonged walking (~ 6 hours). Notices difficulty and \"weakness\" with stair negotiation. Would continue to benefit from skilled PT to continue to reduce pain levels and improve LE stability/strength for prolonged functional mobility activities. Thank you for this referral.    Objective findings:   LEFS 74/80 (last PN: )  0-20: Severe functional limitation   21-40: Moderate functional limitation   41-60: Mild to moderate functional limitation   61-80: Minimal functional limitation or normal function     GROC +5 (a good deal better)    DL hop: 10x  SL hop: R 10x, L 5x with discomfort     PROGRESS TOWARDS PT GOALS  STG 3) Pt will improve worst pain levels from initial evaluation level of 10/10 to 5/10 to show

## 2024-12-31 NOTE — PROGRESS NOTES
Progressed/Changed HEP, detail: see self care for any notable changes   [] Other detail: see self care for any notable changes    N[employer] Access Code Date Issued   [email or web address] 10/16/24     Objective Information/Functional Measures/Assessment     Pt has made steady progress thus far with PT interventions. Pt reports 85-90% overall improvement since beginning PT session. Notices an improvement with her walking tolerance without knee brace, standing tolerance, and some improvement with stair negotiation. In the last 2 weeks, pain has ranged between 0-4/10. Continues to report instability and elevation in pain with \"sudden, quick pivoting\" movements and prolonged walking (~ 6 hours). Notices difficulty and \"weakness\" with stair negotiation.     LEFS 74/80 (last PN: 58)  0-20: Severe functional limitation   21-40: Moderate functional limitation   41-60: Mild to moderate functional limitation   61-80: Minimal functional limitation or normal function    GROC +5 (a good deal better)    SEE PN    Patient will continue to benefit from skilled PT / OT services to modify and progress therapeutic interventions, analyze and address functional mobility deficits, analyze and address ROM deficits, analyze and address strength deficits, analyze and address soft tissue restrictions, analyze and cue for proper movement patterns, analyze and modify for postural abnormalities, and instruct in home and community integration to address functional deficits and attain remaining goals.    Progress toward goals / Updated goals:  [x]  See Progress Note/Recertification    NEXT PN DUE RECERT DATE   01/30/25 NA     # AUTH VISITS AUTH EXPIRATION DATE   7 10/16/24 - 12/14/24   5 11/19/24 - 01/17/25     AUTH EXTENSION REQUEST SUBMITTED 12/31/24    PROGRESS TOWARDS PT GOALS  STG 3) Pt will improve worst pain levels from initial evaluation level of 10/10 to 5/10 to show improved QOL and improved overall perception of pain-free/more pain-free function

## 2025-01-08 ENCOUNTER — HOSPITAL ENCOUNTER (OUTPATIENT)
Facility: HOSPITAL | Age: 40
Setting detail: RECURRING SERIES
Discharge: HOME | End: 2025-01-11
Payer: COMMERCIAL

## 2025-01-08 PROCEDURE — 97535 SELF CARE MNGMENT TRAINING: CPT

## 2025-01-08 PROCEDURE — 97530 THERAPEUTIC ACTIVITIES: CPT

## 2025-01-08 PROCEDURE — 97112 NEUROMUSCULAR REEDUCATION: CPT

## 2025-01-08 NOTE — PROGRESS NOTES
PHYSICAL / OCCUPATIONAL THERAPY - DAILY TREATMENT NOTE    Patient Name: Lauren Summers    Date: 2025    :   Insurance: Payor: BCBS / Plan: BCBS OUT OF STATE / Product Type: *No Product type* /      Patient  verified Yes     Visit #   Current / Total 11 16   Time   In / Out 300  PM   Pain   In / Out 0/10 0/10   Subjective Functional Status/Changes: Pt reports not wearing the knee brace as much but it still does \"pop out of place\" 3x since LV. States she did feel it more when she was walking really fast. Wasn't able to see ortho because she had to pay a bill first but made copy of CD containing MRI        TREATMENT AREA =  Pain in left knee [M25.562]       OBJECTIVE    Modalities Rationale:     decrease inflammation and decrease pain to improve patient's ability to progress to PLOF and address remaining functional goals.     min [] Estim Unattended, type/location:                                      []  w/ice    []  w/heat    min [] Estim Attended, type/location:                                     []  w/US     []  w/ice    []  w/heat    []  TENS insruct      min []  Mechanical Traction: type/lbs                   []  pro   []  sup   []  int   []  cont    []  before manual    []  after manual    min []  Ultrasound, settings/location:      min  unbill [x]  Ice     []  Heat    location/position: To L knee in longsit post-session     min []  Paraffin,  details:     min []  Vasopneumatic Device, press/temp:     min []  Whirlpool / Fluido:    If using vaso (only need to measure limb vaso being performed on)      pre-treatment girth :       post-treatment girth :       measured at (landmark location) :      min []  Other:    Skin assessment post-treatment:   Intact        Therapeutic Procedures:  Tx Min Billable or 1:1 Min (if diff from Tx Min) Procedure, Rationale, Specifics   Total  43 Total    BC Totals Reminder: bill using total billable min of TIMED therapeutic procedures (example: do not

## 2025-01-15 ENCOUNTER — TELEPHONE (OUTPATIENT)
Facility: HOSPITAL | Age: 40
End: 2025-01-15

## 2025-01-15 NOTE — TELEPHONE ENCOUNTER
Called patient to schedule follow up visits. Patient was approved for 6 visits from 1/9 to 3/9/25. Patient schedule two appts and will schedule the rest at her next appt.

## 2025-01-22 ENCOUNTER — TELEPHONE (OUTPATIENT)
Facility: HOSPITAL | Age: 40
End: 2025-01-22

## 2025-01-22 ENCOUNTER — APPOINTMENT (OUTPATIENT)
Facility: HOSPITAL | Age: 40
End: 2025-01-22
Payer: COMMERCIAL

## 2025-01-22 NOTE — TELEPHONE ENCOUNTER
Weather Cancellation. Patient has requested to cancel today's appt via VM. Attempted to call back and r/s. No answer, LVM.

## 2025-01-24 ENCOUNTER — TELEPHONE (OUTPATIENT)
Facility: HOSPITAL | Age: 40
End: 2025-01-24

## 2025-01-24 NOTE — TELEPHONE ENCOUNTER
Pt's 3rd NS today (and 2x <24 CX). \"I forgot\" she said. I informed her of NS/Cx policy and requested she call clinic to schedule more visits. She agreed. I encouraged her to set an alarm for visits as to not be DC'd.

## 2025-01-27 ENCOUNTER — HOSPITAL ENCOUNTER (OUTPATIENT)
Facility: HOSPITAL | Age: 40
Setting detail: RECURRING SERIES
Discharge: HOME | End: 2025-01-30
Payer: COMMERCIAL

## 2025-01-27 PROCEDURE — 97530 THERAPEUTIC ACTIVITIES: CPT

## 2025-01-27 PROCEDURE — 97110 THERAPEUTIC EXERCISES: CPT

## 2025-01-27 NOTE — PROGRESS NOTES
PHYSICAL / OCCUPATIONAL THERAPY - DAILY TREATMENT NOTE    Patient Name: Lauren Summers    Date: 2025    :   Insurance: Payor: BCBS / Plan: BCBS OUT OF STATE / Product Type: *No Product type* /      Patient  verified Yes     Visit #   Current / Total 12 17   Time   In / Out 7:12 7:39   Pain   In / Out 0/10 0/10   Subjective Functional Status/Changes: Pt arrives 12 minutes late  \"I have to leave right at 7:40 because I have to go to work.\"    Reports the knee will get sore. Stiff in the mornings and after sitting for a while. Has not seen doctor yet, was supposed to go on the snow day. Has not gone over [facility] with them yet. Will have to make an apt, hopefully next Friday.   Says the knee cap will still feel like it slides out.        TREATMENT AREA =  Pain in left knee [M25.562]     OBJECTIVE    Modalities Rationale:     decrease inflammation and decrease pain to improve patient's ability to progress to PLOF and address remaining functional goals.     min [] Estim Unattended, type/location:                                      []  w/ice    []  w/heat    min [] Estim Attended, type/location:                                     []  w/US     []  w/ice    []  w/heat    []  TENS insruct      min []  Mechanical Traction: type/lbs                   []  pro   []  sup   []  int   []  cont    []  before manual    []  after manual    min []  Ultrasound, settings/location:      min  unbill []  Ice     []  Heat    location/position: To L knee in longsit post-session     min []  Paraffin,  details:     min []  Vasopneumatic Device, press/temp:     min []  Whirlpool / Fluido:    If using vaso (only need to measure limb vaso being performed on)      pre-treatment girth :       post-treatment girth :       measured at (landmark location) :      min []  Other:    Skin assessment post-treatment:   Intact        Therapeutic Procedures:  Tx Min Billable or 1:1 Min (if diff from Tx Min) Procedure, Rationale, Specifics

## 2025-01-29 ENCOUNTER — HOSPITAL ENCOUNTER (OUTPATIENT)
Facility: HOSPITAL | Age: 40
Setting detail: RECURRING SERIES
Discharge: HOME | End: 2025-02-01
Payer: COMMERCIAL

## 2025-01-29 PROCEDURE — 97530 THERAPEUTIC ACTIVITIES: CPT

## 2025-01-29 PROCEDURE — 97535 SELF CARE MNGMENT TRAINING: CPT

## 2025-01-29 PROCEDURE — 97110 THERAPEUTIC EXERCISES: CPT

## 2025-01-29 NOTE — PROGRESS NOTES
PHYSICAL / OCCUPATIONAL THERAPY - DAILY TREATMENT NOTE    Patient Name: Lauren Summers    Date: 2025    :   Insurance: Payor: BCBS / Plan: BCBS OUT OF STATE / Product Type: *No Product type* /      Patient  verified Yes     Visit #   Current / Total 13 17   Time   In / Out 345  PM   Pain   In / Out 0/10 0/10   Subjective Functional Status/Changes: No pain today. Did a lot of walking over the weekend, up and down a lot for about 10 hrs.   SEE PN     TREATMENT AREA =  Pain in left knee [M25.562]     OBJECTIVE      Therapeutic Procedures:  Tx Min Billable or 1:1 Min (if diff from Tx Min) Procedure, Rationale, Specifics   Total  43 Total   [facility] Totals Reminder: bill using total billable min of TIMED therapeutic procedures (example: do not include dry needle or estim unattended, both untimed codes, in totals to left)  8-22 min = 1 unit; 23-37 min = 2 units; 38-52 min = 3 units; 53-67 min = 4 units; 68-82 min = 5 units   13  94158 Therapeutic Exercise (timed):  increase ROM, strength, coordination, balance, and proprioception to improve patient's ability to progress to PLOF and address remaining functional goals. (see flow sheet as applicable)    Details if applicable  Strength/ROM reassessment   20  60186 Therapeutic Activity (timed):  use of dynamic activities replicating functional movements to increase ROM, strength, coordination, balance, and proprioception in order to improve patient's ability to progress to PLOF and address remaining functional goals.  (see flow sheet as applicable)    Details if applicable:    Functional activity reassessment     11954 Neuromuscular Re-Education (timed):  improve balance, coordination, kinesthetic sense, posture, core stability and proprioception to improve patient's ability to develop conscious control of individual muscles and awareness of position of extremities in order to progress to PLOF and address remaining functional goals. (see flow sheet as

## 2025-01-29 NOTE — THERAPY RECERTIFICATION
[facility]  [address], [address],[place] [address] - Ph: [phone]  Fx: [phone]  PHYSICAL THERAPY PROGRESS NOTE  Patient Name: Lauren Summers : 1985   Treatment/Medical Diagnosis: Pain in left knee [M25.562]   Referral Source: Sharad Padron*     Date of Initial Visit: 10/16/24 Attended Visits: *** Missed Visits: ***     SUMMARY OF TREATMENT  Patient has been evaluated and assessed for Pain in left knee [M25.562]. Pt has attended *** follow up sessions since initial eval,   PT interventions have included therapeutic exercises, neuromuscular re-ed, therapeutic functional activity, and patient education on self-care strategies. HEP provided to maximize gains made from PT. CP for pain control.    CURRENT STATUS  Pt has attended 4 PT follow up sessions, including today, since last PN on 24 due to awaiting insurance authorization and schedule conflicts. L knee MRI completed  Findings concluded ***  However, despite findings, pt has minimal pain or symptoms and no significant functional mobility limitation during ADLs and recreational activities. In the last 2 weeks, daily pain has ranged between 0-6/10 and 1 quick instance of 10/10 when turning in her bed with a weighted blanket.      L hip strength (MMT) Initial eval  (10/16/24) Last PN   (24) Current  (25)   flexion - - -   extension 4- p! 4+ 4+   abduction 3+ 4+ 4+   IR 4+ 4 p! 4+   ER 4 p! knee 4+ 4+      L knee strength (MMT) Initial eval  (10/16/24) Last PN   (24) Current  (25)   flexion 4/5 4+/5 4+   extension 4+/5 5/5 5/5         PROGRESS TOWARDS PT GOALS   LTG 1) Pt will be able to perform SL hopping 10x with </= 3/10 pain and without instability signifying improvement in overall functional capacity and activity tolerance. Status at last EVAL: PROGRESSING;  able to perform 5x L SL hopping (24)   Current: PROGRESSING; able, minimal

## 2025-02-05 ENCOUNTER — HOSPITAL ENCOUNTER (OUTPATIENT)
Facility: HOSPITAL | Age: 40
Setting detail: RECURRING SERIES
Discharge: HOME | End: 2025-02-08
Payer: COMMERCIAL

## 2025-02-05 PROCEDURE — 97112 NEUROMUSCULAR REEDUCATION: CPT

## 2025-02-05 PROCEDURE — 97110 THERAPEUTIC EXERCISES: CPT

## 2025-02-05 PROCEDURE — 97530 THERAPEUTIC ACTIVITIES: CPT

## 2025-02-05 NOTE — PROGRESS NOTES
PHYSICAL / OCCUPATIONAL THERAPY - DAILY TREATMENT NOTE    Patient Name: Lauren Summers    Date: 2025    :   Insurance: Payor: BCBS / Plan: BCBS OUT OF STATE / Product Type: *No Product type* /      Patient  verified Yes     Visit #   Current / Total 14 17   Time   In / Out 222  PM   Pain   In / Out 0/10 0/10   Subjective Functional Status/Changes: Pt reports having 1 instance of knee dislocation since LV. Did it bed - foot got stuck in the weighted blanket.      TREATMENT AREA =  Pain in left knee [M25.562]     OBJECTIVE      Therapeutic Procedures:  Tx Min Billable or 1:1 Min (if diff from Tx Min) Procedure, Rationale, Specifics   Total  39 Total   [facility] Totals Reminder: bill using total billable min of TIMED therapeutic procedures (example: do not include dry needle or estim unattended, both untimed codes, in totals to left)  8-22 min = 1 unit; 23-37 min = 2 units; 38-52 min = 3 units; 53-67 min = 4 units; 68-82 min = 5 units   10  31429 Therapeutic Exercise (timed):  increase ROM, strength, coordination, balance, and proprioception to improve patient's ability to progress to PLOF and address remaining functional goals. (see flow sheet as applicable)    Details if applicable     14  28287 Therapeutic Activity (timed):  use of dynamic activities replicating functional movements to increase ROM, strength, coordination, balance, and proprioception in order to improve patient's ability to progress to PLOF and address remaining functional goals.  (see flow sheet as applicable)    Details if applicable:       15  49073 Neuromuscular Re-Education (timed):  improve balance, coordination, kinesthetic sense, posture, core stability and proprioception to improve patient's ability to develop conscious control of individual muscles and awareness of position of extremities in order to progress to PLOF and address remaining functional goals. (see flow sheet as applicable)    Details if applicable:

## 2025-02-07 ENCOUNTER — APPOINTMENT (OUTPATIENT)
Facility: HOSPITAL | Age: 40
End: 2025-02-07
Payer: COMMERCIAL

## 2025-02-12 ENCOUNTER — APPOINTMENT (OUTPATIENT)
Facility: HOSPITAL | Age: 40
End: 2025-02-12
Payer: COMMERCIAL

## 2025-02-14 ENCOUNTER — APPOINTMENT (OUTPATIENT)
Facility: HOSPITAL | Age: 40
End: 2025-02-14
Payer: COMMERCIAL

## 2025-02-18 ENCOUNTER — TELEPHONE (OUTPATIENT)
Facility: HOSPITAL | Age: 40
End: 2025-02-18

## 2025-02-19 ENCOUNTER — APPOINTMENT (OUTPATIENT)
Facility: HOSPITAL | Age: 40
End: 2025-02-19
Payer: COMMERCIAL

## 2025-02-26 ENCOUNTER — TELEPHONE (OUTPATIENT)
Facility: HOSPITAL | Age: 40
End: 2025-02-26

## 2025-02-26 ENCOUNTER — APPOINTMENT (OUTPATIENT)
Facility: HOSPITAL | Age: 40
End: 2025-02-26
Payer: COMMERCIAL